# Patient Record
Sex: MALE | Race: WHITE | NOT HISPANIC OR LATINO | Employment: OTHER | URBAN - METROPOLITAN AREA
[De-identification: names, ages, dates, MRNs, and addresses within clinical notes are randomized per-mention and may not be internally consistent; named-entity substitution may affect disease eponyms.]

---

## 2017-05-28 PROCEDURE — 93296 REM INTERROG EVL PM/IDS: CPT | Performed by: INTERNAL MEDICINE

## 2017-05-28 PROCEDURE — 93295 DEV INTERROG REMOTE 1/2/MLT: CPT | Performed by: INTERNAL MEDICINE

## 2017-05-30 ENCOUNTER — CLINICAL SUPPORT NO REQUIREMENTS (OUTPATIENT)
Dept: CARDIOLOGY | Facility: CLINIC | Age: 62
End: 2017-05-30

## 2017-05-30 DIAGNOSIS — I48.0 PAROXYSMAL ATRIAL FIBRILLATION (HCC): ICD-10-CM

## 2017-05-30 DIAGNOSIS — I25.5 ISCHEMIC CARDIOMYOPATHY: ICD-10-CM

## 2017-06-06 RX ORDER — AMIODARONE HYDROCHLORIDE 200 MG/1
200 TABLET ORAL DAILY
Qty: 90 TABLET | Refills: 2 | Status: SHIPPED | OUTPATIENT
Start: 2017-06-06 | End: 2018-06-09 | Stop reason: SDUPTHER

## 2017-06-06 RX ORDER — SPIRONOLACTONE 25 MG/1
TABLET ORAL
Qty: 90 TABLET | Refills: 1 | Status: SHIPPED | OUTPATIENT
Start: 2017-06-06 | End: 2017-12-27 | Stop reason: SDUPTHER

## 2017-06-06 RX ORDER — OMEPRAZOLE 40 MG/1
CAPSULE, DELAYED RELEASE ORAL
Qty: 30 CAPSULE | Refills: 5 | Status: SHIPPED | OUTPATIENT
Start: 2017-06-06 | End: 2017-12-18 | Stop reason: HOSPADM

## 2017-07-07 ENCOUNTER — OFFICE VISIT (OUTPATIENT)
Dept: CARDIOLOGY | Facility: CLINIC | Age: 62
End: 2017-07-07

## 2017-07-07 VITALS
SYSTOLIC BLOOD PRESSURE: 130 MMHG | HEIGHT: 70 IN | BODY MASS INDEX: 29.84 KG/M2 | DIASTOLIC BLOOD PRESSURE: 81 MMHG | HEART RATE: 75 BPM | WEIGHT: 208.4 LBS

## 2017-07-07 DIAGNOSIS — I44.7 LEFT BUNDLE BRANCH BLOCK: ICD-10-CM

## 2017-07-07 DIAGNOSIS — I25.5 ISCHEMIC CARDIOMYOPATHY: Primary | ICD-10-CM

## 2017-07-07 DIAGNOSIS — I73.9 PERIPHERAL VASCULAR DISEASE (HCC): ICD-10-CM

## 2017-07-07 DIAGNOSIS — I25.9 ISCHEMIC HEART DISEASE: ICD-10-CM

## 2017-07-07 DIAGNOSIS — E78.5 DYSLIPIDEMIA: ICD-10-CM

## 2017-07-07 PROCEDURE — 99213 OFFICE O/P EST LOW 20 MIN: CPT | Performed by: INTERNAL MEDICINE

## 2017-07-07 RX ORDER — ALBUTEROL SULFATE 90 UG/1
2 AEROSOL, METERED RESPIRATORY (INHALATION) EVERY 6 HOURS PRN
Refills: 0 | COMMUNITY
Start: 2017-05-04 | End: 2019-07-10 | Stop reason: SDUPTHER

## 2017-07-07 NOTE — PROGRESS NOTES
Subjective:     Encounter Date:07/07/2017      Patient ID: Reza Mcclelland is a 62 y.o. single white male, [employer] , from [place], [place].      PHYSICIAN: Rolanda Billingsley MD/EMILY Romo  NEUROLOGIST: Jonas Quiroz MD   CARDIOVASCULAR SURGEON: Guillaume Pruitt MD, [facility]  INTERVENTIONAL CARDIOLOGIST: Ismael Nguyen MD, [facility]/ Oliverio Lemus MD, [facility]   ELECTROPHYSIOLOGIST: Joshua Jasmine MD, [facility], [facility]/Abhishek Kong MD, [facility]: DAKOTA Peters  ENDOCRINOLOGIST: unknown    Chief Complaint:   Chief Complaint   Patient presents with   • Atrial Fibrillation   • Cardiomyopathy     Problem List:  1. Ischemic heart disease/ischemic cardiomyopathy:  a. Remote non-ST-elevation myocardial infarction with emergent left heart catheterization and successful percutaneous transluminal coronary angioplasty and stenting of the ramus intermedius with a 3.0 x 12 mm Xience drug-eluting stent, 11/19/2008.  b. Left heart catheterization revealing severe stenosis in the LAD of about 80% to 90% in the mid portion with 2.5 x 28 mm, 3.0 x 28 mm, and 3.5 x 18 mm Xience drug-eluting stents in the mid and proximal portion of the LAD, critical left anterior descending stenosis with recommendations to return in 2 weeks for further intervention, 12/04/2008.  c. Left heart catheterization revealing critical right coronary artery stenosis with 3.5 x 28 mm Xience drug-eluting stent deployed in the mid RCA and Xience drug-eluting stent deployed in the proximal RCA, also revealing severe peripheral vascular disease with critical bilateral stenosis of common iliac artery with recommendations for the patient to remain on Plavix and aspirin and further evaluation of iliac stenosis, 12/18/2008.  d. Recurrent CCS class II-III angina with subsequent left heart catheterization and stenting with a 3.5 x 15 mm Xience drug-eluting stent to the proximal  LAD and 2.5 x 18 mm Xience drug-eluting stent into the distal LAD with widely patent stents in the RCA, 90% lesion in the stent of the LAD and widely patent stent of the diagonal branch. LVEF (0.65) with no wall motion abnormalities. Severe peripheral arterial disease with 60% stenosis of the left common iliac artery and 80% to 90% stenosis in the distal common femoral artery with recommendations for abdominal aortogram with right and left lower extremity arteriogram, 01/21/2011.  e. Cardiac catheterization with percutaneous transluminal angioplasty and self-expanding stent placement to the proximal innominate artery, distal embolic protection filter placement in the right carotid artery for cerebral protection during procedure, 10 x 20 mm Xact self-expanding stent deployed to the proximal innominate stenosis, total occlusion of the proximal left internal carotid artery, 11/29/2011.  f. Markedly reduced echocardiographic LVEF (0.25) with left ventricular chamber enlargement and thickened mitral valve leaflets with moderate MR in the absence of pericardial effusion or intracavitary thrombus/mass with markedly abnormal EKG demonstrating LBBB with marked QRS widening, spring 2013.  g. Recurrent progressive chest pain syndrome with severe single-vessel obstructive coronary disease and 100% total occlusion proximal right coronary artery at site of previously placed stent with mild nonobstructive plaque in the left coronary artery system and previously placed stents in the LAD, ramus intermedius artery patent with left ventriculography deferred and moderate bilateral common iliac disease, April 2013, with subsequent bi-ventricular AICD implant ([employer], model #[id number]) by Dr. Kong.  h. Acute non-ST elevation myocardial infarction/left heart catheterization, Dr. Lemus, with Xience drug-eluting stent to the ramus intermedius: Nonobstructive 50% to 60% mid to distal left anterior descending stenosis. Chronic  total occlusion of the dominant right coronary artery served by left-sided collaterals with left ventricular systolic dysfunction. EF 10% to 15%, 08/20/2014.  i. Biventricular ICD interrogation 7/7/17; RA pacing 72%, RV pacing 96% longevity 1.8 years, mode switch less than 1%, longest was 7 hours 5/9/17, 1 noise reversion both channels  j. Residual CCS class I angina pectoris/NYHA class II-III exertional dyspnea and fatigue.   2. Peripheral vascular disease:  a. Cardiac catheterization revealing total occlusion of left internal carotid artery previously in 2011.  b. CT angiogram of the neck showing total occlusion of the left internal carotid artery, 50% to 60% stenosis of the right carotid bulb, stent in the brachiocephalic artery placed by Dr. Santamaria in November 2011, totally occluded, March 2013.  c. Occluded left carotid artery with occluded innominate artery and intracranial circulation via thyroid artery collaterals and left vertebral with left axillary to right axillary bypass graft using 8 mm. PTFE ringed Propaten graft with arteriogram to the right subclavian artery and right axillary artery and right carotid artery, April 2013.  3. Cerebrovascular accident and initiation of warfarin therapy, March 2013.  4. Atrial fibrillation on presentation to outside hospital converted to sinus with Cardizem drip and subsequent recurrence of atrial fibrillation while in the ICU, treated with amiodarone and returned to sinus, August 2014.  5. Dyslipidemia.  6. Stage 3 chronic kidney disease.   7. Left bundle branch block, probably combined ischemic/nonischemic cardiomyopathy.  8. Remote acute-on-chronic respiratory failure with hospital admission for pneumonia on ventilator.  9. History of hypertension.  10. Tobacco abuse, previously resolved but recurrent, with cessation using Chantix, winter of 2015.  11. No prior additional surgical intervention.   12. Progressive erectile dysfunction.  13. Serial acceptable AICD  interrogations: February 2014; August 2014; March 2015, July 2017, with home Merlin monitoring.  14. Hyperthyroidism with abnormal thyroid ultrasound, finding thyroid nodule-data deficit.  2017     No Known Allergies      Current Outpatient Prescriptions:   •  amiodarone (PACERONE) 200 MG tablet, Take 1 tablet by mouth Daily., Disp: 90 tablet, Rfl: 2  •  Apixaban (ELIQUIS PO), Take 5 mg by mouth Daily. Pt takes 0.5 tablet bid, Disp: , Rfl:   •  atorvastatin (LIPITOR) 20 MG tablet, take 1 tablet by mouth at bedtime, Disp: 30 tablet, Rfl: 9  •  carvedilol (COREG) 6.25 MG tablet, Take 1 tablet by mouth 2 (Two) Times a Day With Meals., Disp: 60 tablet, Rfl: 9  •  RL-[id number]-D-Omega 3-Phytoster (ANIMI-3/VITAMIN D PO), Take  by mouth Daily., Disp: , Rfl:   •  furosemide (LASIX) 20 MG tablet, take 1 tablet by mouth twice a day, Disp: 60 tablet, Rfl: 9  •  Magnesium Oxide 400 (240 MG) MG tablet, Take 0.5 tablets by mouth daily., Disp: 30 tablet, Rfl: 5  •  omeprazole (priLOSEC) 40 MG capsule, take 1 capsule by mouth once daily, Disp: 30 capsule, Rfl: 5  •  potassium chloride (K-DUR,KLOR-CON) 20 MEQ CR tablet, take 1 tablet by mouth once daily, Disp: 30 tablet, Rfl: 5  •  sildenafil (VIAGRA) 50 MG tablet, Take 1 tablet by mouth Daily As Needed for erectile dysfunction., Disp: 10 tablet, Rfl: 3  •  spironolactone (ALDACTONE) 25 MG tablet, take 1 tablet by mouth once daily, Disp: 90 tablet, Rfl: 1  •  VENTOLIN  (90 BASE) MCG/ACT inhaler, As Needed., Disp: , Rfl: 0    History of Present Illness Patient returns for followup after a 7-month hiatus.  He denies any chest discomfort, marked shortness of breath, palpitations, edema, presyncope, and syncope.  He feels that he may need to retire soon because people are aggravating him at work, and he is having more difficulty completing his work assignments because of excessive fatigue, weakness, and dyspnea.  He is also tired of working in the extreme heat.  He had a recall on  "his ICD but had an interrogation while in office today which was acceptable; see below.  He had laboratory values drawn, and was found to have hyperthyroidism.  He has started seeing an endocrinologist but does not recall the name.  They performed an ultrasound on his thyroid and found that he had a nodule, but he is unsure of whether it is on the left side or the right side.  He is scheduled to go back to see his endocrinologist soon.  The doctor had concerns that his amiodarone was causing thyroid dysfunction.  No current tobacco or NTG use.      Review of Systems   Constitution: Positive for malaise/fatigue.   HENT: Positive for hearing loss.    Respiratory: Positive for snoring and sputum production.    Endocrine: Positive for polydipsia and polyuria.   Hematologic/Lymphatic: Bruises/bleeds easily.   Musculoskeletal: Positive for arthritis.   Gastrointestinal: Positive for heartburn.   Genitourinary: Positive for frequency.   Neurological: Positive for numbness.      Obtained and otherwise negative except as outlined in problem list and HPI.    Procedures [employer] biventricular Unify Quadra interrogation  DDDR 72% RA paced, 96% RV and LV paced.  Pacemaker dependent.  Nominal threshold and impedance for RA, RV, and LV leads.  Longevity 1.8 years.  Charge time 9.5 seconds.  Mode switched less than 1% with the longest 7 hours on 9 May 2017 consistent with atrial fibrillation with controlled ventricular response.  Nominal device check, normal pacemaker site, no reprogramming.       Objective:       Vitals:    07/07/17 1533 07/07/17 1535   BP: 125/79 130/81   BP Location: Left arm Left arm   Patient Position: Sitting Standing   Pulse: 70 75   Weight: 208 lb 6.4 oz (94.5 kg)    Height: 70\" (177.8 cm)      Body mass index is 29.9 kg/(m^2).   Last weight:  216 lbs.    Physical Exam   Constitutional: He is oriented to person, place, and time. He appears well-developed and well-nourished.   Neck: No JVD present. Carotid " bruit is present (bilateral). No thyromegaly present.   Cardiovascular: Regular rhythm, S1 normal and S2 normal.  Exam reveals no gallop, no S3 and no friction rub.    Murmur heard.   Medium-pitched harsh early systolic murmur is present with a grade of 2/6  at the upper left sternal border  Pulses:       Carotid pulses are 1+ on the right side, and 1+ on the left side.       Radial pulses are 1+ on the right side, and 1+ on the left side.        Femoral pulses are 1+ on the right side, and 1+ on the left side.       Popliteal pulses are 1+ on the right side, and 1+ on the left side.        Dorsalis pedis pulses are 1+ on the right side, and 1+ on the left side.        Posterior tibial pulses are 1+ on the right side, and 1+ on the left side.   Pulmonary/Chest: Effort normal. He has decreased breath sounds. He has no wheezes. He has no rhonchi. He has no rales.   Abdominal: Soft. He exhibits no mass. There is no hepatosplenomegaly. There is no tenderness. There is no guarding.   Lymphadenopathy:     He has no cervical adenopathy.   Neurological: He is alert and oriented to person, place, and time.   Skin: Skin is warm, dry and intact. No rash noted.   Vitals reviewed.      Lab Review:   Lab Results   Component Value Date    GLUCOSE 85 03/30/2015    BUN 19 03/30/2015    CREATININE 1.6 (H) 03/30/2015    CO2 27 03/30/2015    CALCIUM 9.3 03/30/2015    ALBUMIN 4.3 11/20/2014    AST 15 11/20/2014    ALT 12 11/20/2014       Lab Results   Component Value Date    WBC 11.72 (H) 03/30/2015    HGB 17.3 03/30/2015    HCT 48.8 03/30/2015    MCV 85.8 03/30/2015     03/30/2015       Lab Results   Component Value Date    HGBA1C 5.1 08/20/2014       Lab Results   Component Value Date    TRIG 65 08/20/2014     Lab Results   Component Value Date    HDL 23 (L) 08/20/2014   3/23/17;  · PSA 0.6  · CBC; WBC 9.7, RBC 5.44, hemoglobin 16.4, hematocrit 48.4, MCV 89, MCH 30, MCHC 34, RDW 14.1, MPV 8.4, platelets 159, neutrophils 48,  lymphocytes 36, monocytes 7, eos 5, basos 2  · TSH 0.065  ·   Biventricular ICD interrogation 7/7/17; RA pacing 72%, RV pacing 96% longevity 1.8 years, mode switch less than 1%, longest was 7 hours 5/9/17, 1 noise reversion both channels    Assessment:    Patient with ischemic heart disease/ischemic cardiomyopathy, with last EF in 2014 of 0.10-0.15.  He has a biventricular ICD which was interrogated today with acceptable results.  Patient is having more difficulties with his work, related to fatigue.  In view of his ischemic cardiomyopathy, peripheral vascular disease, paroxysmal atrial fibrillation, and stage III chronic kidney disease, we feel that it would be beneficial for him to be on disability.  We encouraged the patient to start this process.  We will order an echocardiogram to be performed when he returns in December 2017.  We are happy that he is having further evaluation of his thyroid dysfunction and thyroid nodule with an endocrinologist; data deficit.     Diagnosis Plan   1. Ischemic cardiomyopathy     2. Ischemic heart disease     3. Peripheral vascular disease     4. Left bundle branch block     5. Dyslipidemia            Plan:         1. Patient to continue current medications and close follow up with the above providers.  2. Tentative cardiology follow up in December 2017 or patient may return sooner PRN.  3. Echocardiogram when he returns for followup in December 2017.       Scribed for Andrea Guevara MD by Rachael Michel, APRN. 7/7/2017  3:51 PM    I, Andrea Guevara MD, [facility], personally performed the services described in this documentation as scribed by the above named individual in my presence, and it is both accurate and complete. At 4:11 PM on 07/07/2017

## 2017-07-12 ENCOUNTER — TELEPHONE (OUTPATIENT)
Dept: CARDIOLOGY | Facility: CLINIC | Age: 62
End: 2017-07-12

## 2017-07-12 NOTE — TELEPHONE ENCOUNTER
Patient states that he as decided to retire from his position given current health status.      He is requesting a letter from Dr. Guevara stating that given his current medical condition he is not fit to work.  Specifically that he is not able to lift over 25lbs continually - as this is a stipulation for his work.    Please advise.

## 2017-10-10 RX ORDER — POTASSIUM CHLORIDE 20 MEQ/1
TABLET, EXTENDED RELEASE ORAL
Qty: 30 TABLET | Refills: 5 | Status: SHIPPED | OUTPATIENT
Start: 2017-10-10 | End: 2019-02-09 | Stop reason: SDUPTHER

## 2017-11-28 ENCOUNTER — TELEPHONE (OUTPATIENT)
Dept: CARDIOLOGY | Facility: CLINIC | Age: 62
End: 2017-11-28

## 2017-11-28 NOTE — TELEPHONE ENCOUNTER
Ms. Herrera called stating that patient is in [facility].  She stated he had a heart attack and is on a ventilator in the cardiac ICU there.  She said the hospital had faxed a medical record request but had not received medical records.  I sent cardiac cath, ekg's and the last two office notes.  She was insisting on the doctors talking to [facility] but the physicians have not called. She left a number to the floor for [facility] to call and speak with Anjel Moyer or Dr. Jordna. [phone].

## 2017-12-04 ENCOUNTER — TELEPHONE (OUTPATIENT)
Dept: CARDIOLOGY | Facility: CLINIC | Age: 62
End: 2017-12-04

## 2017-12-04 NOTE — TELEPHONE ENCOUNTER
Ms. Herrera called regarding patient.  He was released from [facility] this Sunday and she wanted to know if he needed to be seen this week since he had stents place.  Informed patient that normally a hospital follow ups are a month out and that insurance will not pay if for it.  I asked her if was on Plavix and she yes.  I told her if he had any problems between now and his scheduled appoint of January 10, 2018 to call and that to get have his medical records from [place] faxed to us. Gave her the fax number. She verbalizes understanding.

## 2017-12-12 ENCOUNTER — TELEPHONE (OUTPATIENT)
Dept: CARDIOLOGY | Facility: CLINIC | Age: 62
End: 2017-12-12

## 2017-12-12 NOTE — TELEPHONE ENCOUNTER
Patient is in [facility] in [place] and has had stroke.  His emergency contact Hannah Herrera is requesting he be seen sooner than January 10th. I told her she would need to call me back when he is out of the hospital before I can move him up on the schedule.

## 2017-12-13 ENCOUNTER — TELEPHONE (OUTPATIENT)
Dept: CARDIOLOGY | Facility: CLINIC | Age: 62
End: 2017-12-13

## 2017-12-13 NOTE — TELEPHONE ENCOUNTER
Patient is being released from hospital today and needs to be seen ASAP for 60-70% blockage in left carotid artery according the Hannah Herrera which caused his stroke. Made an appointment for patient Friday 12-15 @ 9 am. Informed them to bring medical records to appointment or have them faxed to me.  She verbalizes understanding.

## 2017-12-15 ENCOUNTER — PREP FOR SURGERY (OUTPATIENT)
Dept: OTHER | Facility: HOSPITAL | Age: 62
End: 2017-12-15

## 2017-12-15 ENCOUNTER — APPOINTMENT (OUTPATIENT)
Dept: PULMONOLOGY | Facility: HOSPITAL | Age: 62
End: 2017-12-15
Attending: INTERNAL MEDICINE

## 2017-12-15 ENCOUNTER — APPOINTMENT (OUTPATIENT)
Dept: CARDIOLOGY | Facility: HOSPITAL | Age: 62
End: 2017-12-15

## 2017-12-15 ENCOUNTER — HOSPITAL ENCOUNTER (OUTPATIENT)
Facility: HOSPITAL | Age: 62
Setting detail: OBSERVATION
Discharge: HOME | End: 2017-12-18
Attending: INTERNAL MEDICINE | Admitting: INTERNAL MEDICINE

## 2017-12-15 ENCOUNTER — APPOINTMENT (OUTPATIENT)
Dept: CT IMAGING | Facility: HOSPITAL | Age: 62
End: 2017-12-15

## 2017-12-15 ENCOUNTER — OFFICE VISIT (OUTPATIENT)
Dept: CARDIOLOGY | Facility: CLINIC | Age: 62
End: 2017-12-15

## 2017-12-15 ENCOUNTER — APPOINTMENT (OUTPATIENT)
Dept: GENERAL RADIOLOGY | Facility: HOSPITAL | Age: 62
End: 2017-12-15

## 2017-12-15 VITALS
DIASTOLIC BLOOD PRESSURE: 73 MMHG | HEART RATE: 70 BPM | WEIGHT: 199.4 LBS | BODY MASS INDEX: 30.22 KG/M2 | HEIGHT: 68 IN | SYSTOLIC BLOOD PRESSURE: 115 MMHG

## 2017-12-15 DIAGNOSIS — I25.5 ISCHEMIC CARDIOMYOPATHY: ICD-10-CM

## 2017-12-15 DIAGNOSIS — I25.9 ISCHEMIC HEART DISEASE: Primary | ICD-10-CM

## 2017-12-15 DIAGNOSIS — I63.9 CEREBROVASCULAR ACCIDENT (CVA), UNSPECIFIED MECHANISM (HCC): ICD-10-CM

## 2017-12-15 DIAGNOSIS — N18.30 CHRONIC KIDNEY DISEASE (CKD), STAGE III (MODERATE) (HCC): ICD-10-CM

## 2017-12-15 DIAGNOSIS — E78.5 DYSLIPIDEMIA: ICD-10-CM

## 2017-12-15 DIAGNOSIS — I48.0 PAROXYSMAL ATRIAL FIBRILLATION (HCC): ICD-10-CM

## 2017-12-15 DIAGNOSIS — J96.20 ACUTE ON CHRONIC RESPIRATORY FAILURE, UNSPECIFIED WHETHER WITH HYPOXIA OR HYPERCAPNIA (HCC): ICD-10-CM

## 2017-12-15 DIAGNOSIS — R13.12 OROPHARYNGEAL DYSPHAGIA: ICD-10-CM

## 2017-12-15 PROBLEM — J41.0 SIMPLE CHRONIC BRONCHITIS (HCC): Status: ACTIVE | Noted: 2017-12-15

## 2017-12-15 PROBLEM — I65.21 CAROTID STENOSIS, RIGHT: Status: ACTIVE | Noted: 2017-12-15

## 2017-12-15 PROBLEM — I65.22 CAROTID OCCLUSION, LEFT: Status: ACTIVE | Noted: 2017-12-15

## 2017-12-15 PROBLEM — Z95.810 AICD (AUTOMATIC CARDIOVERTER/DEFIBRILLATOR) PRESENT: Status: ACTIVE | Noted: 2017-12-15

## 2017-12-15 LAB
ALBUMIN SERPL-MCNC: 4 G/DL (ref 3.2–4.8)
ALBUMIN/GLOB SERPL: 1.4 G/DL (ref 1.5–2.5)
ALP SERPL-CCNC: 70 U/L (ref 25–100)
ALT SERPL W P-5'-P-CCNC: 23 U/L (ref 7–40)
ANION GAP SERPL CALCULATED.3IONS-SCNC: 12 MMOL/L (ref 3–11)
APTT PPP: 29.1 SECONDS (ref 24–31)
AST SERPL-CCNC: 17 U/L (ref 0–33)
BASOPHILS # BLD AUTO: 0.01 10*3/MM3 (ref 0–0.2)
BASOPHILS NFR BLD AUTO: 0.1 % (ref 0–1)
BH CV ECHO MEAS - AI DEC SLOPE: 137.3 CM/SEC^2
BH CV ECHO MEAS - AI MAX PG: 54.2 MMHG
BH CV ECHO MEAS - AI MAX VEL: 368 CM/SEC
BH CV ECHO MEAS - AI P1/2T: 784.8 MSEC
BH CV ECHO MEAS - AO ROOT AREA (BSA CORRECTED): 1.8
BH CV ECHO MEAS - AO ROOT AREA: 11.3 CM^2
BH CV ECHO MEAS - AO ROOT DIAM: 3.8 CM
BH CV ECHO MEAS - BSA(HAYCOCK): 2.1 M^2
BH CV ECHO MEAS - BSA(HAYCOCK): 2.1 M^2
BH CV ECHO MEAS - BSA: 2 M^2
BH CV ECHO MEAS - BSA: 2.1 M^2
BH CV ECHO MEAS - BZI_BMI: 28.3 KILOGRAMS/M^2
BH CV ECHO MEAS - BZI_BMI: 29.8 KILOGRAMS/M^2
BH CV ECHO MEAS - BZI_METRIC_HEIGHT: 172.7 CM
BH CV ECHO MEAS - BZI_METRIC_HEIGHT: 177.8 CM
BH CV ECHO MEAS - BZI_METRIC_WEIGHT: 88.9 KG
BH CV ECHO MEAS - BZI_METRIC_WEIGHT: 89.4 KG
BH CV ECHO MEAS - CONTRAST EF (2CH): 17.8 ML/M^2
BH CV ECHO MEAS - CONTRAST EF 4CH: 38 ML/M^2
BH CV ECHO MEAS - EDV(CUBED): 335.7 ML
BH CV ECHO MEAS - EDV(MOD-SP2): 286 ML
BH CV ECHO MEAS - EDV(MOD-SP4): 245 ML
BH CV ECHO MEAS - EDV(TEICH): 251.3 ML
BH CV ECHO MEAS - EF(CUBED): 48.8 %
BH CV ECHO MEAS - EF(MOD-SP2): 17.8 %
BH CV ECHO MEAS - EF(MOD-SP4): 38 %
BH CV ECHO MEAS - EF(TEICH): 39.9 %
BH CV ECHO MEAS - ESV(CUBED): 171.9 ML
BH CV ECHO MEAS - ESV(MOD-SP2): 235 ML
BH CV ECHO MEAS - ESV(MOD-SP4): 152 ML
BH CV ECHO MEAS - ESV(TEICH): 151.2 ML
BH CV ECHO MEAS - FS: 20 %
BH CV ECHO MEAS - IVS/LVPW: 0.86
BH CV ECHO MEAS - IVSD: 1.2 CM
BH CV ECHO MEAS - LA DIMENSION: 4.3 CM
BH CV ECHO MEAS - LA/AO: 1.1
BH CV ECHO MEAS - LV DIASTOLIC VOL/BSA (35-75): 118.1 ML/M^2
BH CV ECHO MEAS - LV MASS(C)D: 377.7 GRAMS
BH CV ECHO MEAS - LV MASS(C)DI: 182.1 GRAMS/M^2
BH CV ECHO MEAS - LV SYSTOLIC VOL/BSA (12-30): 73.3 ML/M^2
BH CV ECHO MEAS - LVIDD: 7 CM
BH CV ECHO MEAS - LVIDS: 5.6 CM
BH CV ECHO MEAS - LVLD AP2: 9.4 CM
BH CV ECHO MEAS - LVLD AP4: 8.5 CM
BH CV ECHO MEAS - LVLS AP2: 10 CM
BH CV ECHO MEAS - LVLS AP4: 9.7 CM
BH CV ECHO MEAS - LVPWD: 1.2 CM
BH CV ECHO MEAS - MV A MAX VEL: 72.1 CM/SEC
BH CV ECHO MEAS - MV DEC TIME: 0.33 SEC
BH CV ECHO MEAS - MV E MAX VEL: 37 CM/SEC
BH CV ECHO MEAS - MV E/A: 0.51
BH CV ECHO MEAS - PA ACC SLOPE: 609 CM/SEC^2
BH CV ECHO MEAS - PA ACC TIME: 0.1 SEC
BH CV ECHO MEAS - PA PR(ACCEL): 34.5 MMHG
BH CV ECHO MEAS - RVDD: 2.7 CM
BH CV ECHO MEAS - SI(CUBED): 79 ML/M^2
BH CV ECHO MEAS - SI(MOD-SP2): 24.6 ML/M^2
BH CV ECHO MEAS - SI(MOD-SP4): 44.8 ML/M^2
BH CV ECHO MEAS - SI(TEICH): 48.3 ML/M^2
BH CV ECHO MEAS - SV(CUBED): 163.8 ML
BH CV ECHO MEAS - SV(MOD-SP2): 51 ML
BH CV ECHO MEAS - SV(MOD-SP4): 93 ML
BH CV ECHO MEAS - SV(TEICH): 100.2 ML
BH CV ECHO MEAS - TAPSE (>1.6): 2.4 CM2
BH CV VAS BP RIGHT ARM: NORMAL MMHG
BH CV XLRA - RV BASE: 2.8 CM
BH CV XLRA - RV LENGTH: 7.6 CM
BH CV XLRA - RV MID: 2.2 CM
BH CV XLRA - TDI S': 10.4 CM/SEC
BH CV XLRA MEAS LEFT DIST CCA EDV: 12.3 CM/SEC
BH CV XLRA MEAS LEFT DIST CCA PSV: 47.1 CM/SEC
BH CV XLRA MEAS LEFT MID CCA EDV: 18 CM/SEC
BH CV XLRA MEAS LEFT MID CCA PSV: 71.8 CM/SEC
BH CV XLRA MEAS LEFT PROX CCA EDV: 10.1 CM/SEC
BH CV XLRA MEAS LEFT PROX CCA PSV: 77.5 CM/SEC
BH CV XLRA MEAS LEFT PROX ECA EDV: 24.7 CM/SEC
BH CV XLRA MEAS LEFT PROX ECA PSV: 138 CM/SEC
BH CV XLRA MEAS LEFT PROX SCLA EDV: 38.2 CM/SEC
BH CV XLRA MEAS LEFT PROX SCLA PSV: 199 CM/SEC
BH CV XLRA MEAS LEFT VERTEBRAL A EDV: 14 CM/SEC
BH CV XLRA MEAS LEFT VERTEBRAL A PSV: 37.7 CM/SEC
BH CV XLRA MEAS RIGHT DIST CCA EDV: 19.9 CM/SEC
BH CV XLRA MEAS RIGHT DIST CCA PSV: 33.1 CM/SEC
BH CV XLRA MEAS RIGHT DIST ICA EDV: 40.3 CM/SEC
BH CV XLRA MEAS RIGHT DIST ICA PSV: 68.4 CM/SEC
BH CV XLRA MEAS RIGHT ICA/CCA RATIO: 2.3
BH CV XLRA MEAS RIGHT MID CCA EDV: 17.1 CM/SEC
BH CV XLRA MEAS RIGHT MID CCA PSV: 30.9 CM/SEC
BH CV XLRA MEAS RIGHT MID ICA EDV: 45.8 CM/SEC
BH CV XLRA MEAS RIGHT MID ICA PSV: 78.3 CM/SEC
BH CV XLRA MEAS RIGHT PROX CCA EDV: 18.2 CM/SEC
BH CV XLRA MEAS RIGHT PROX CCA PSV: 29.8 CM/SEC
BH CV XLRA MEAS RIGHT PROX ECA EDV: 19.3 CM/SEC
BH CV XLRA MEAS RIGHT PROX ECA PSV: 40.8 CM/SEC
BH CV XLRA MEAS RIGHT PROX ICA EDV: 33.1 CM/SEC
BH CV XLRA MEAS RIGHT PROX ICA PSV: 60.1 CM/SEC
BILIRUB SERPL-MCNC: 1 MG/DL (ref 0.3–1.2)
BNP SERPL-MCNC: 637 PG/ML (ref 0–100)
BUN BLD-MCNC: 30 MG/DL (ref 9–23)
BUN/CREAT SERPL: 21.4 (ref 7–25)
CALCIUM SPEC-SCNC: 9.2 MG/DL (ref 8.7–10.4)
CHLORIDE SERPL-SCNC: 98 MMOL/L (ref 99–109)
CO2 SERPL-SCNC: 27 MMOL/L (ref 20–31)
CREAT BLD-MCNC: 1.4 MG/DL (ref 0.6–1.3)
DEPRECATED RDW RBC AUTO: 42.6 FL (ref 37–54)
EOSINOPHIL # BLD AUTO: 0.02 10*3/MM3 (ref 0–0.3)
EOSINOPHIL NFR BLD AUTO: 0.1 % (ref 0–3)
ERYTHROCYTE [DISTWIDTH] IN BLOOD BY AUTOMATED COUNT: 13.1 % (ref 11.3–14.5)
GFR SERPL CREATININE-BSD FRML MDRD: 51 ML/MIN/1.73
GLOBULIN UR ELPH-MCNC: 2.8 GM/DL
GLUCOSE BLD-MCNC: 128 MG/DL (ref 70–100)
HCT VFR BLD AUTO: 49.3 % (ref 38.9–50.9)
HGB BLD-MCNC: 16.7 G/DL (ref 13.1–17.5)
IMM GRANULOCYTES # BLD: 0.08 10*3/MM3 (ref 0–0.03)
IMM GRANULOCYTES NFR BLD: 0.4 % (ref 0–0.6)
INR PPP: 1.14
LEFT ATRIUM VOLUME INDEX: 25.6 ML/M2
LV EF 2D ECHO EST: 30 %
LYMPHOCYTES # BLD AUTO: 1.24 10*3/MM3 (ref 0.6–4.8)
LYMPHOCYTES NFR BLD AUTO: 6.9 % (ref 24–44)
MAXIMAL PREDICTED HEART RATE: 158 BPM
MCH RBC QN AUTO: 30.5 PG (ref 27–31)
MCHC RBC AUTO-ENTMCNC: 33.9 G/DL (ref 32–36)
MCV RBC AUTO: 90.1 FL (ref 80–99)
MONOCYTES # BLD AUTO: 0.76 10*3/MM3 (ref 0–1)
MONOCYTES NFR BLD AUTO: 4.2 % (ref 0–12)
NEUTROPHILS # BLD AUTO: 15.97 10*3/MM3 (ref 1.5–8.3)
NEUTROPHILS NFR BLD AUTO: 88.3 % (ref 41–71)
PLATELET # BLD AUTO: 231 10*3/MM3 (ref 150–450)
PMV BLD AUTO: 9.7 FL (ref 6–12)
POTASSIUM BLD-SCNC: 4.1 MMOL/L (ref 3.5–5.5)
PROT SERPL-MCNC: 6.8 G/DL (ref 5.7–8.2)
PROTHROMBIN TIME: 12.5 SECONDS (ref 9.6–11.5)
RBC # BLD AUTO: 5.47 10*6/MM3 (ref 4.2–5.76)
RIGHT ARM BP: NORMAL MMHG
SODIUM BLD-SCNC: 137 MMOL/L (ref 132–146)
STRESS TARGET HR: 134 BPM
WBC NRBC COR # BLD: 18.08 10*3/MM3 (ref 3.5–10.8)

## 2017-12-15 PROCEDURE — 71010 HC CHEST PA OR AP: CPT

## 2017-12-15 PROCEDURE — G0378 HOSPITAL OBSERVATION PER HR: HCPCS

## 2017-12-15 PROCEDURE — 99214 OFFICE O/P EST MOD 30 MIN: CPT | Performed by: INTERNAL MEDICINE

## 2017-12-15 PROCEDURE — 70450 CT HEAD/BRAIN W/O DYE: CPT

## 2017-12-15 PROCEDURE — 92610 EVALUATE SWALLOWING FUNCTION: CPT

## 2017-12-15 PROCEDURE — 94060 EVALUATION OF WHEEZING: CPT | Performed by: INTERNAL MEDICINE

## 2017-12-15 PROCEDURE — 85730 THROMBOPLASTIN TIME PARTIAL: CPT | Performed by: NURSE PRACTITIONER

## 2017-12-15 PROCEDURE — 93880 EXTRACRANIAL BILAT STUDY: CPT | Performed by: INTERNAL MEDICINE

## 2017-12-15 PROCEDURE — 93306 TTE W/DOPPLER COMPLETE: CPT | Performed by: INTERNAL MEDICINE

## 2017-12-15 PROCEDURE — 83880 ASSAY OF NATRIURETIC PEPTIDE: CPT | Performed by: NURSE PRACTITIONER

## 2017-12-15 PROCEDURE — 25010000002 FUROSEMIDE PER 20 MG: Performed by: NURSE PRACTITIONER

## 2017-12-15 PROCEDURE — 85610 PROTHROMBIN TIME: CPT | Performed by: NURSE PRACTITIONER

## 2017-12-15 PROCEDURE — 93880 EXTRACRANIAL BILAT STUDY: CPT

## 2017-12-15 PROCEDURE — G8996 SWALLOW CURRENT STATUS: HCPCS

## 2017-12-15 PROCEDURE — 80053 COMPREHEN METABOLIC PANEL: CPT | Performed by: NURSE PRACTITIONER

## 2017-12-15 PROCEDURE — 93306 TTE W/DOPPLER COMPLETE: CPT

## 2017-12-15 PROCEDURE — 96374 THER/PROPH/DIAG INJ IV PUSH: CPT

## 2017-12-15 PROCEDURE — 85025 COMPLETE CBC W/AUTO DIFF WBC: CPT | Performed by: NURSE PRACTITIONER

## 2017-12-15 PROCEDURE — 99219 PR INITIAL OBSERVATION CARE/DAY 50 MINUTES: CPT | Performed by: INTERNAL MEDICINE

## 2017-12-15 PROCEDURE — G8997 SWALLOW GOAL STATUS: HCPCS

## 2017-12-15 PROCEDURE — 94060 EVALUATION OF WHEEZING: CPT

## 2017-12-15 PROCEDURE — 94640 AIRWAY INHALATION TREATMENT: CPT

## 2017-12-15 PROCEDURE — 99245 OFF/OP CONSLTJ NEW/EST HI 55: CPT | Performed by: PSYCHIATRY & NEUROLOGY

## 2017-12-15 PROCEDURE — 93000 ELECTROCARDIOGRAM COMPLETE: CPT | Performed by: INTERNAL MEDICINE

## 2017-12-15 RX ORDER — DOXYCYCLINE HYCLATE 100 MG
100 TABLET ORAL 2 TIMES DAILY
COMMUNITY
End: 2018-01-03

## 2017-12-15 RX ORDER — AMIODARONE HYDROCHLORIDE 200 MG/1
200 TABLET ORAL DAILY
Status: DISCONTINUED | OUTPATIENT
Start: 2017-12-16 | End: 2017-12-18 | Stop reason: HOSPADM

## 2017-12-15 RX ORDER — ATORVASTATIN CALCIUM 80 MG/1
80 TABLET, FILM COATED ORAL NIGHTLY
COMMUNITY
End: 2018-01-15 | Stop reason: SDUPTHER

## 2017-12-15 RX ORDER — PANTOPRAZOLE SODIUM 40 MG/1
40 TABLET, DELAYED RELEASE ORAL EVERY MORNING
Status: DISCONTINUED | OUTPATIENT
Start: 2017-12-16 | End: 2017-12-18 | Stop reason: HOSPADM

## 2017-12-15 RX ORDER — ATORVASTATIN CALCIUM 40 MG/1
80 TABLET, FILM COATED ORAL NIGHTLY
Status: DISCONTINUED | OUTPATIENT
Start: 2017-12-15 | End: 2017-12-18 | Stop reason: HOSPADM

## 2017-12-15 RX ORDER — CEFUROXIME AXETIL 500 MG/1
500 TABLET ORAL 2 TIMES DAILY
COMMUNITY
End: 2018-01-03

## 2017-12-15 RX ORDER — CARVEDILOL 6.25 MG/1
6.25 TABLET ORAL 2 TIMES DAILY WITH MEALS
Status: DISCONTINUED | OUTPATIENT
Start: 2017-12-15 | End: 2017-12-18 | Stop reason: HOSPADM

## 2017-12-15 RX ORDER — PREDNISONE 10 MG/1
10 TABLET ORAL DAILY
COMMUNITY
End: 2018-01-03

## 2017-12-15 RX ORDER — FUROSEMIDE 10 MG/ML
20 INJECTION INTRAMUSCULAR; INTRAVENOUS EVERY 12 HOURS
Status: DISCONTINUED | OUTPATIENT
Start: 2017-12-15 | End: 2017-12-18 | Stop reason: HOSPADM

## 2017-12-15 RX ORDER — IPRATROPIUM BROMIDE AND ALBUTEROL SULFATE 2.5; .5 MG/3ML; MG/3ML
3 SOLUTION RESPIRATORY (INHALATION)
Status: DISCONTINUED | OUTPATIENT
Start: 2017-12-15 | End: 2017-12-18 | Stop reason: HOSPADM

## 2017-12-15 RX ORDER — SPIRONOLACTONE 25 MG/1
25 TABLET ORAL DAILY
Status: DISCONTINUED | OUTPATIENT
Start: 2017-12-16 | End: 2017-12-18 | Stop reason: HOSPADM

## 2017-12-15 RX ORDER — PREDNISONE 10 MG/1
30 TABLET ORAL DAILY
Status: DISCONTINUED | OUTPATIENT
Start: 2017-12-16 | End: 2017-12-18 | Stop reason: HOSPADM

## 2017-12-15 RX ADMIN — ATORVASTATIN CALCIUM 80 MG: 40 TABLET, FILM COATED ORAL at 21:53

## 2017-12-15 RX ADMIN — IPRATROPIUM BROMIDE AND ALBUTEROL SULFATE 3 ML: .5; 3 SOLUTION RESPIRATORY (INHALATION) at 21:10

## 2017-12-15 RX ADMIN — FUROSEMIDE 20 MG: 10 INJECTION, SOLUTION INTRAMUSCULAR; INTRAVENOUS at 13:24

## 2017-12-15 RX ADMIN — APIXABAN 2.5 MG: 2.5 TABLET, FILM COATED ORAL at 21:53

## 2017-12-15 RX ADMIN — TICAGRELOR 90 MG: 90 TABLET ORAL at 21:53

## 2017-12-15 NOTE — THERAPY EVALUATION
Acute Care - Speech Language Pathology   Swallow Initial Evaluation [facility]   Clinical Swallow Evaluation       Patient Name: Reza Mcclelland  : 1955  MRN: [id number]  Today's Date: 12/15/2017               Admit Date: 12/15/2017    Visit Dx:     ICD-10-CM ICD-9-CM   1. Ischemic heart disease I25.9 414.9   2. Acute on chronic respiratory failure, unspecified whether with hypoxia or hypercapnia J96.20 518.84     Patient Active Problem List   Diagnosis   • Ischemic heart disease   • Ischemic cardiomyopathy   • Peripheral vascular disease   • Cerebrovascular accident   • Dyslipidemia   • Chronic kidney disease (CKD), stage III (moderate)   • Left bundle branch block   • History of hypertension   • COPD   • AICD (automatic cardioverter/defibrillator) present   • Carotid occlusion, left     Past Medical History:   Diagnosis Date   • Acute on chronic respiratory failure     Recent acute-on-chronic respiratory failure with hospital admission for pneumonia on ventilator.   • AICD (automatic cardioverter/defibrillator) present    • Atrial fibrillation     Atrial fibrillation on presentation to outside hospital converted to sinus with Cardizem drip and subsequent recurrence of atrial fibrillation while in the ICU, treated with amiodarone and returned to sinus, 2014.   • CAD (coronary artery disease)    • Cerebrovascular accident     Cerebrovascular accident and initiation of warfarin therapy, 2013.   • Chronic kidney disease (CKD), stage III (moderate)    • Dyslipidemia    • History of hypertension    • Ischemic cardiomyopathy    • Ischemic heart disease    • Left bundle branch block     Left bundle branch block, probably combined ischemic/nonischemic cardiomyopathy.   • Peripheral vascular disease      Past Surgical History:   Procedure Laterality Date   • AXILLARY AXILLARY BYPASS     • CAROTID STENT            SWALLOW EVALUATION (last 72 hours)      Swallow Evaluation       12/15/17 7824                 Rehab Evaluation    Document Type evaluation  -VW        Subjective Information no complaints;agree to therapy  -VW        General Information    Patient Profile Review yes  -VW        Subjective Patient Observations alert, cooperative  -VW        Pertinent History Of Current Problem Pt adm for ischemic cardiomyopathy. PMH includes stroke (2013). No imaging completed at this time. Pt reports he has had swallowing services at a previous hospitalization and that he had ground food.   -VW        Current Diet Limitations NPO  -VW        Precautions/Limitations, Vision WFL;other (see comments)   for purposes of eval  -VW        Precautions/Limitations, Hearing WFL;other (see comments)   for purposes of eval  -VW        Prior Level of Function- Communication functional in all spheres  -VW        Prior Level of Function- Swallowing diet modifications- foods;other (comment)   per patient, he had ground food at previous hospitalization  -VW        Plans/Goals Discussed With patient;other;agreed upon   RN  -VW        Barriers to Rehab none identified  -VW        Clinical Impression    Patient's Goals For Discharge return to PO diet  -VW        SLP Swallowing Diagnosis other (see comments)   r/o pharyngeal dysphagia  -VW        Criteria for Skilled Therapeutic Interventions Met demonstrates skilled criteria for intervention  -VW        FCM, Swallowing 2-->Level 2  -VW        SLP Diet Recommendation NPO: unsafe for food/liquid intake  -VW        Recommended Diagnostics FEES  -VW        SLP Rec. for Method of Medication Administration meds whole in pudding/applesauce  -VW        Anticipated Discharge Disposition other (see comments)   unknown at this time  -VW        Pain Assessment    Pain Assessment No/denies pain  -VW        Oral Motor Structure and Function    Oral Motor Anatomy and Physiology patient demonstrates anatomy and physiology that is WNL  -VW        Dentition Assessment missing teeth  -VW        Secretion  Management WNL/WFL  -VW        Mucosal Quality moist, healthy  -VW        Oral Musculature General Assessment oral labial impairment  -        Oral Labial Strength and Mobility left labial droop  -        General Feeding/Swallowing Observations    Current Feeding Method NPO  -        Observations of Posture During Feeding upright at 90 degrees  -        Clinical Swallow Exam    Mode of Presentation fed by clinician;self fed;cup;spoon;straw  -VW        Oral Preparation Concerns unable to assess;other (see comments)   DNT solid  -VW        Oral Transit Concerns unable to assess;other (see comments)   DNT solid  -VW        Oral Phase Results unable to assess;other (see comments)   DNT solid  -VW        Pharyngeal Phase Results sign/symptoms of pharyngeal impairment;throat clear  -VW        Summary of Clinical Exam Trialed thins via cup/straw, NTL, and puree. Throat clear w/ every consistency trialed. Pt w/ history of stroke. Recommended NPO until further evaluation can be completed. Meds okay in pudding.   -        Swallow Recommendations    Oral Care oral care with toothbrush and dentifrice BID and PRN  -        Other Recommendations meds whole;pudding/spoon thick  -        Recommended Diet NPO: unsafe for food/liquid intake  -          User Key  (r) = Recorded By, (t) = Taken By, (c) = Cosigned By    Initials Name Effective Dates     Randa Morton MA, CFY-SLP 07/13/17 -         EDUCATION  The patient has been educated in the following areas:   Dysphagia (Swallowing Impairment) Oral Care/Hydration NPO rationale.    SLP Recommendation and Plan  SLP Swallowing Diagnosis: other (see comments) (r/o pharyngeal dysphagia)  SLP Diet Recommendation: NPO: unsafe for food/liquid intake     SLP Rec. for Method of Medication Administration: meds whole in pudding/applesauce     Recommended Diagnostics: FEES  Criteria for Skilled Therapeutic Interventions Met: demonstrates skilled criteria for  "intervention  Anticipated Discharge Disposition: other (see comments) (unknown at this time)                   Plan of Care Review  Plan Of Care Reviewed With: patient  Progress:  (initial eval)  Outcome Summary/Follow up Plan: Clinical dysphagia evaluation completed this pm: Pt w/ hx stroke, per chart. Pt reported having modified diet during previous hospitalization (\"ground food\"), denied any liquid consistency change or swallowing difficulty. Imaging pending at time of evaluation. Pt trialed thin via cup/straw, nectar-thick, and puree. Consistent throat clear w/ every consistency trialed. Pt recommended NPO until further evaluation can be completed. Meds okay in pudding.              Time Calculation:         Time Calculation- SLP       12/15/17 1634          Time Calculation- SLP    SLP Start Time 1545  -VW      SLP Received On 12/15/17  -        User Key  (r) = Recorded By, (t) = Taken By, (c) = Cosigned By    Initials Name Provider Type     Randa Morton MA, CFY-SLP Speech and Language Pathologist          Therapy Charges for Today     Code Description Service Date Service Provider Modifiers Qty    71884449766 HC ST EVAL ORAL PHARYNG SWALLOW 4 12/15/2017 Randa Morton MA, CFY-SLP GN 1    25239000527 HC ST SWALLOWING CURRENT STATUS 12/15/2017 Randa Morton MA, CFY-SLP GN, CN 1    88512241896 HC ST SWALLOWING PROJECTED 12/15/2017 Randa Morton MA, CFY-SLP NYLA, CI 1          SLP G-Codes  Functional Limitations: Swallowing  Swallow Current Status (): 100 percent impaired, limited or restricted  Swallow Goal Status (): At least 1 percent but less than 20 percent impaired, limited or restricted    Randa Morton MA, CFY-SLP  12/15/2017  "

## 2017-12-15 NOTE — CONSULTS
[facility] Medicine Services  HISTORY AND PHYSICAL    Patient Name: Reza Mcclelland  : 1955  MRN: [id number]  Primary Care Physician: EMILY Romo    Subjective   Subjective     Chief Complaint:shortness of breath      HPI:  Reza Mcclelland is a 62 y.o. male with complicated vascular history who was recently hospitalized in November in [place] for respiratory failure and NSTEMI. Again he was admitted in [facility] for shortness of breath and left facial droop.  Currently patient is without complaint. Is admitted for further evaluation of his shortness of breath.  He denies fever, chills, trouble swallowing or choking on food. He does drool. His speech is slurred.  He denies palpitations, sweats, bleeding and  Otherwise ROS is negative    Review of Systems   Otherwise complete 10-system ROS is negative except as mentioned in the HPI.    Personal History     Past Medical History:   Diagnosis Date   • Acute on chronic respiratory failure     Recent acute-on-chronic respiratory failure with hospital admission for pneumonia on ventilator.   • AICD (automatic cardioverter/defibrillator) present    • Atrial fibrillation     Atrial fibrillation on presentation to outside hospital converted to sinus with Cardizem drip and subsequent recurrence of atrial fibrillation while in the ICU, treated with amiodarone and returned to sinus, 2014.   • CAD (coronary artery disease)    • Cerebrovascular accident     Cerebrovascular accident and initiation of warfarin therapy, 2013.   • Chronic kidney disease (CKD), stage III (moderate)    • Dyslipidemia    • History of hypertension    • Ischemic cardiomyopathy    • Ischemic heart disease    • Left bundle branch block     Left bundle branch block, probably combined ischemic/nonischemic cardiomyopathy.   • Peripheral vascular disease        Past Surgical History:   Procedure Laterality Date   • AXILLARY AXILLARY BYPASS     •  CAROTID STENT         Family History: family history includes Cancer in his brother; Dementia in his mother; Heart disease in his father; Prostate cancer in his father.     Social History:  reports that he has quit smoking. His smoking use included Cigarettes. He has never used smokeless tobacco. He reports that he does not drink alcohol or use illicit drugs.    Medications:  Prescriptions Prior to Admission   Medication Sig Dispense Refill Last Dose   • apixaban (ELIQUIS) 2.5 MG tablet tablet Take 2.5 mg by mouth 2 (Two) Times a Day.      • amiodarone (PACERONE) 200 MG tablet Take 1 tablet by mouth Daily. 90 tablet 2 Taking   • atorvastatin (LIPITOR) 20 MG tablet take 1 tablet by mouth at bedtime (Patient not taking: Reported on 12/15/2017) 30 tablet 9 Not Taking   • atorvastatin (LIPITOR) 80 MG tablet Take 80 mg by mouth Every Night.      • carvedilol (COREG) 6.25 MG tablet Take 1 tablet by mouth 2 (Two) Times a Day With Meals. 60 tablet 9 Taking   • cefuroxime (CEFTIN) 500 MG tablet Take 500 mg by mouth 2 (Two) Times a Day.      • doxycycline (VIBRAMYICN) 100 MG tablet Take 100 mg by mouth 2 (Two) Times a Day.      • [id number] 3-Phytoster (ANIMI-3/VITAMIN D PO) Take  by mouth Daily.   Not Taking   • furosemide (LASIX) 20 MG tablet take 1 tablet by mouth twice a day 60 tablet 9 Taking   • omeprazole (priLOSEC) 40 MG capsule take 1 capsule by mouth once daily 30 capsule 5 Taking   • potassium chloride (K-DUR,KLOR-CON) 20 MEQ CR tablet take 1 tablet by mouth once daily (Patient taking differently: 2 a week) 30 tablet 5 Taking   • predniSONE (DELTASONE) 10 MG tablet Take 10 mg by mouth Daily. 3 tablets once daily with breakfast      • spironolactone (ALDACTONE) 25 MG tablet take 1 tablet by mouth once daily 90 tablet 1 Taking   • ticagrelor (BRILINTA) 90 MG tablet tablet Take 90 mg by mouth 2 (Two) Times a Day.      • VENTOLIN  (90 BASE) MCG/ACT inhaler As Needed.  0 Not Taking       No Known  Allergies    Objective   Objective     Vital Signs:   Temp:  [97.7 °F (36.5 °C)] 97.7 °F (36.5 °C)  Heart Rate:  [70-81] 70  Resp:  [18] 18  BP: ()/(64-80) 92/71   Physical Exam   Patient is alert and talkative in no distress at rest  Neck is without mass or JVD  Left facial droop, tongue points to the left.  Heart is Reg wo murmur  Lungs are clear wo wheeze or crackle  Abd is soft without HSM or mass, not tender or distended, obese  MAEW  Skin is without rash  Neurologic exam in nonfocal   Mood is appropriate    Results Reviewed:  I have personally reviewed current lab, radiology, and data and agree.      Results from last 7 days  Lab Units 12/15/17  1251   WBC 10*3/mm3 18.08*   HEMOGLOBIN g/dL 16.7   HEMATOCRIT % 49.3   PLATELETS 10*3/mm3 231   INR  1.14       Results from last 7 days  Lab Units 12/15/17  1251   SODIUM mmol/L 137   POTASSIUM mmol/L 4.1   CHLORIDE mmol/L 98*   CO2 mmol/L 27.0   BUN mg/dL 30*   CREATININE mg/dL 1.40*   GLUCOSE mg/dL 128*   CALCIUM mg/dL 9.2   ALT (SGPT) U/L 23   AST (SGOT) U/L 17     BNP   Date Value Ref Range Status   12/15/2017 637.0 (H) 0.0 - 100.0 pg/mL Final     Imaging Results (last 24 hours)     Procedure Component Value Units Date/Time    XR Chest 1 View [[id number]] Updated:  12/15/17 1333            Assessment/Plan   Assessment / Plan       Active Problems:    Ischemic heart disease    Ischemic cardiomyopathy    Peripheral vascular disease    Cerebrovascular accident    COPD    AICD (automatic cardioverter/defibrillator) present    Carotid occlusion, left    Assessment & Plan:  63 yo with HO CAD with multiple stents now presenting with recurrent smothering spells.  They could be related to CHF and flash pulmonary edema.  He could also have COPD/cold induced asthma, VC dysfunction.  Will get PFTs, try scheduled nebs,  Await studies.  I do not see evidence of pneumonia on PCXR  He is quite hypotensive today. May need some fluid back.      DVT prophylaxis:    Anticoagulants     Start     Dose/Rate Route Frequency Ordered Stop    12/15/17 2100  apixaban (ELIQUIS) tablet 2.5 mg      2.5 mg Oral Every 12 Hours Scheduled 12/15/17 1249          CODE STATUS:full    Admission Status: obs status for now      Kenia Dash MD 12/15/17 3:10 PM

## 2017-12-15 NOTE — PLAN OF CARE
Problem: Patient Care Overview (Adult)  Goal: Plan of Care Review  Outcome: Ongoing (interventions implemented as appropriate)  Goal: Adult Individualization and Mutuality  Outcome: Ongoing (interventions implemented as appropriate)  Goal: Discharge Needs Assessment  Outcome: Ongoing (interventions implemented as appropriate)    Problem: Fall Risk (Adult)  Goal: Identify Related Risk Factors and Signs and Symptoms  Outcome: Ongoing (interventions implemented as appropriate)  Goal: Absence of Falls  Outcome: Ongoing (interventions implemented as appropriate)    Problem: Cardiac: Heart Failure (Adult)  Goal: Signs and Symptoms of Listed Potential Problems Will be Absent or Manageable (Cardiac: Heart Failure)  Outcome: Ongoing (interventions implemented as appropriate)

## 2017-12-15 NOTE — CONSULTS
Neurology    Referring Provider: DAKOTA Acevedo    Reason for Consultation: stroke      Chief complaint: shortness of breath, facial droop    Subjective .     History of present illness:  Mr. Mcclelland is a pleasant 62-year-old male with a past medical history significant for CAD, ischemic cardiomyopathy, A. fib with AICD, hypertension, dyslipidemia who is admitted to the cardiology service for shortness of breath and evaluation of ischemic cardiomyopathy.  He states that he was down in [place] on vacation and had difficulty breathing.  He reports he was taken to [facility] where he was evaluated and ended up having some coronary stents placed.  He then later drove back to [place] where he went to the visitation after the passing of his brother.  He developed acute shortness of breath and went to an outside hospital ED where he was told he had a stroke.  At that time, he complained of left lower facial droop and drooling out of that side of his mouth.  He denied any visual disturbance or extremity motor or somatosensory involvement.  He did complain of some mildly slurred speech however.    Review of Systems: Positive for facial droop and slurred speech and shortness of breath.Otherwise complete review of systems was discussed with the patient and found to be negative except for that mentioned in history of present illness or in the initial H&P dated 12/15/2017    History  Past Medical History:   Diagnosis Date   • Acute on chronic respiratory failure     Recent acute-on-chronic respiratory failure with hospital admission for pneumonia on ventilator.   • AICD (automatic cardioverter/defibrillator) present    • Atrial fibrillation     Atrial fibrillation on presentation to outside hospital converted to sinus with Cardizem drip and subsequent recurrence of atrial fibrillation while in the ICU, treated with amiodarone and returned to sinus, August 2014.   • CAD (coronary artery disease)    •  Cerebrovascular accident     Cerebrovascular accident and initiation of warfarin therapy, March 2013.   • Chronic kidney disease (CKD), stage III (moderate)    • Dyslipidemia    • History of hypertension    • Ischemic cardiomyopathy    • Ischemic heart disease    • Left bundle branch block     Left bundle branch block, probably combined ischemic/nonischemic cardiomyopathy.   • Peripheral vascular disease    ,   Past Surgical History:   Procedure Laterality Date   • AXILLARY AXILLARY BYPASS     • CAROTID STENT     ,   Family History   Problem Relation Age of Onset   • Dementia Mother    • Prostate cancer Father    • Heart disease Father    • Cancer Brother    ,   Social History   Substance Use Topics   • Smoking status: Former Smoker     Types: Cigarettes   • Smokeless tobacco: Never Used      Comment: Pt quit smoking 6-7 months ago   • Alcohol use No      Comment: Pt says he smokes a cigarette sometimes   ,   Prescriptions Prior to Admission   Medication Sig Dispense Refill Last Dose   • apixaban (ELIQUIS) 2.5 MG tablet tablet Take 2.5 mg by mouth 2 (Two) Times a Day.      • amiodarone (PACERONE) 200 MG tablet Take 1 tablet by mouth Daily. 90 tablet 2 Taking   • atorvastatin (LIPITOR) 20 MG tablet take 1 tablet by mouth at bedtime (Patient not taking: Reported on 12/15/2017) 30 tablet 9 Not Taking   • atorvastatin (LIPITOR) 80 MG tablet Take 80 mg by mouth Every Night.      • carvedilol (COREG) 6.25 MG tablet Take 1 tablet by mouth 2 (Two) Times a Day With Meals. 60 tablet 9 Taking   • cefuroxime (CEFTIN) 500 MG tablet Take 500 mg by mouth 2 (Two) Times a Day.      • doxycycline (VIBRAMYICN) 100 MG tablet Take 100 mg by mouth 2 (Two) Times a Day.      • [id number]-T5-[id number]-D-Omega 3-Phytoster (ANIMI-3/VITAMIN D PO) Take  by mouth Daily.   Not Taking   • furosemide (LASIX) 20 MG tablet take 1 tablet by mouth twice a day 60 tablet 9 Taking   • omeprazole (priLOSEC) 40 MG capsule take 1 capsule by mouth once daily 30 capsule 5  "Taking   • potassium chloride (K-DUR,KLOR-CON) 20 MEQ CR tablet take 1 tablet by mouth once daily (Patient taking differently: 2 a week) 30 tablet 5 Taking   • predniSONE (DELTASONE) 10 MG tablet Take 10 mg by mouth Daily. 3 tablets once daily with breakfast      • spironolactone (ALDACTONE) 25 MG tablet take 1 tablet by mouth once daily 90 tablet 1 Taking   • ticagrelor (BRILINTA) 90 MG tablet tablet Take 90 mg by mouth 2 (Two) Times a Day.      • VENTOLIN  (90 BASE) MCG/ACT inhaler As Needed.  0 Not Taking    and Allergies:  Review of patient's allergies indicates no known allergies.    Objective     Vital Signs   Blood pressure 92/71, pulse 70, temperature 97.7 °F (36.5 °C), temperature source Oral, resp. rate 18, height 172.7 cm (68\"), weight 88.9 kg (196 lb), SpO2 97 %.    Physical Exam:      Gen: Lying in bed with eyes open. In NAD. Appears stated age   Eyes: PERRL, conjuntivae/lids normal   ENT: External canals normal bilaterally. Dentition normal.    Neck: Supple. No thyroid enlargement noted   Respiratory: CTA bilaterally. Respirations unlabored   CV: RRR, S1 and S2 nml. Radial pulses 2+ bilaterally.    Skin: No rashes noted on exposed skin. Normal tugor.   MSK: Normal bulk and tone. Nml ROM     Neurologic:   Mental status: Awake, alert, oriented x4. Follows commands. Speech mildly slurred.    CN: PERRL, EOM intact, sensation intact in upper/mid/lower face bilaterally, left lower facial droop, hearing intact to finger rub bilaterally, palate elevates symmetrically, tongue movements and SCMs strong bilaterally    Motor: Strength full (5/5) throughout in BUE and BLE    Reflexes: 2+ throughout    Sensory: Intact to LT throughout   Coordination: No dysmetria noted   Gait: Not tested        Results Reviewed:     Labs reviewed   EKG report reviewed  Chest x-ray report reviewed                     Assessment/Plan     1. Left facial droop = concerning for ischemic stroke. Unable to get MRI due to his AICD. " Will get CT head without contrast. Lipid profile and A1c in AM. Agree with TTE and carotid duplex, currently pending. Rehab eval. Continue Eliquis, Brilinta, and atorvastatin.    2. Hypertension = continue meds    3.  Hyperlipidemia = on atorvastatin. Lipid profile in AM    4.  A. Fib = in paced rhythm. On Eliquis    5.  Ischemic cardiomyopathy = on Eliquis, Brilinta, and atorvastatin        Janey Wang MD  12/15/17  2:33 PM

## 2017-12-15 NOTE — PLAN OF CARE
"Problem: Patient Care Overview (Adult)  Goal: Plan of Care Review  Outcome: Ongoing (interventions implemented as appropriate)    12/15/17 8239   Coping/Psychosocial Response Interventions   Plan Of Care Reviewed With patient   Patient Care Overview   Progress (initial eval)   Outcome Evaluation   Outcome Summary/Follow up Plan Clinical dysphagia evaluation completed this pm: Pt w/ hx stroke, per chart. Pt reported having modified diet during previous hospitalization (\"ground food\"), denied any liquid consistency change or swallowing difficulty. Imaging pending at time of evaluation. Pt trialed thin via cup/straw, nectar-thick, and puree. Consistent throat clear w/ every consistency trialed. Pt recommended NPO until further evaluation can be completed. Meds okay in pudding.            "

## 2017-12-15 NOTE — PROGRESS NOTES
Subjective:     Encounter Date:12/15/2017    Patient ID: Reza Mcclelland is a 62 y.o. single white male, retired [employer] , from [place], [place].       PHYSICIAN: Rolanda Billingsley MD/EMILY Romo  NEUROLOGIST: Jonas Quiroz MD   CARDIOVASCULAR SURGEON: Guillaume Pruitt MD, [facility]  INTERVENTIONAL CARDIOLOGIST: Ismael Nguyen MD, [facility]/ Oliverio Lemus MD, [facility]   ELECTROPHYSIOLOGIST: Joshua Jasmine MD, [facility], [facility]/Abhishek Kong MD, [facility]: DAKOTA Peters  ENDOCRINOLOGIST: unknown       Chief Complaint:   Chief Complaint   Patient presents with   • ischemic heart disease   • electrolyte imbalance     Problem List:  1. Ischemic heart disease/ischemic cardiomyopathy:  a. Remote non-ST-elevation myocardial infarction with emergent left heart catheterization and successful percutaneous transluminal coronary angioplasty and stenting of the ramus intermedius with a 3.0 x 12 mm Xience drug-eluting stent, 11/19/2008.  b. Left heart catheterization revealing severe stenosis in the LAD of about 80% to 90% in the mid portion with 2.5 x 28 mm, 3.0 x 28 mm, and 3.5 x 18 mm Xience drug-eluting stents in the mid and proximal portion of the LAD, critical left anterior descending stenosis with recommendations to return in 2 weeks for further intervention, 12/04/2008.  c. Left heart catheterization revealing critical right coronary artery stenosis with 3.5 x 28 mm Xience drug-eluting stent deployed in the mid RCA and Xience drug-eluting stent deployed in the proximal RCA, also revealing severe peripheral vascular disease with critical bilateral stenosis of common iliac artery with recommendations for the patient to remain on Plavix and aspirin and further evaluation of iliac stenosis, 12/18/2008.  d. Recurrent CCS class II-III angina with subsequent left heart catheterization and stenting with a 3.5 x 15 mm Xience drug-eluting  stent to the proximal LAD and 2.5 x 18 mm Xience drug-eluting stent into the distal LAD with widely patent stents in the RCA, 90% lesion in the stent of the LAD and widely patent stent of the diagonal branch. LVEF (0.65) with no wall motion abnormalities. Severe peripheral arterial disease with 60% stenosis of the left common iliac artery and 80% to 90% stenosis in the distal common femoral artery with recommendations for abdominal aortogram with right and left lower extremity arteriogram, 01/21/2011.  e. Cardiac catheterization with percutaneous transluminal angioplasty and self-expanding stent placement to the proximal innominate artery, distal embolic protection filter placement in the right carotid artery for cerebral protection during procedure, 10 x 20 mm Xact self-expanding stent deployed to the proximal innominate stenosis, total occlusion of the proximal left internal carotid artery, 11/29/2011.  f. Markedly reduced echocardiographic LVEF (0.25) with left ventricular chamber enlargement and thickened mitral valve leaflets with moderate MR in the absence of pericardial effusion or intracavitary thrombus/mass with markedly abnormal EKG demonstrating LBBB with marked QRS widening, spring 2013.  g. Recurrent progressive chest pain syndrome with severe single-vessel obstructive coronary disease and 100% total occlusion proximal right coronary artery at site of previously placed stent with mild nonobstructive plaque in the left coronary artery system and previously placed stents in the LAD, ramus intermedius artery patent with left ventriculography deferred and moderate bilateral common iliac disease, April 2013, with subsequent bi-ventricular AICD implant ([employer], model #[id number]) by Dr. Kong.  h. Acute non-ST elevation myocardial infarction/left heart catheterization, Dr. Lemus, with Xience drug-eluting stent to the ramus intermedius: Nonobstructive 50% to 60% mid to distal left anterior  descending stenosis. Chronic total occlusion of the dominant right coronary artery served by left-sided collaterals with left ventricular systolic dysfunction. EF 10% to 15%, 08/20/2014.  i. Biventricular ICD interrogation 7/7/17; RA pacing 72%, RV pacing 96% longevity 1.8 years, mode switch less than 1%, longest was 7 hours 5/9/17, 1 noise reversion both channels  j. Residual CCS class I angina pectoris/NYHA class II-III exertional dyspnea and fatigue.   k. Recent apparent acute congestive heart failure with non-STEMI and possible bronchopneumonia with respiratory failure requiring intubation and apparent diagnostic coronary angiography with 3 stents placed with subsequent apparent AMA discharge - data deficit, [facility], [place], [place], November 2017.  l. Residual CCS class I angina pectoris/NYHA class III-IV biventricular CHF, December 2017.  2. Peripheral vascular disease:  a. Cardiac catheterization revealing total occlusion of left internal carotid artery previously in 2011.  b. CT angiogram of the neck showing total occlusion of the left internal carotid artery, 50% to 60% stenosis of the right carotid bulb, stent in the brachiocephalic artery placed by Dr. Santamaria in November 2011, totally occluded, March 2013.  c. Occluded left carotid artery with occluded innominate artery and intracranial circulation via thyroid artery collaterals and left vertebral with left axillary to right axillary bypass graft using 8 mm. PTFE ringed Propaten graft with arteriogram to the right subclavian artery and right axillary artery and right carotid artery, April 2013.  d. Recent hospitalization, [facility], with congestive heart failure and stroke - data deficit (December 2017), with apparent acute injury noted.  3. Cerebrovascular accident and initiation of warfarin therapy, March 2013.  4. Atrial fibrillation on presentation to outside hospital converted to sinus with  Cardizem drip and subsequent recurrence of atrial fibrillation while in the ICU, treated with amiodarone and returned to sinus, August 2014.  5. Dyslipidemia.  6. Stage 3 chronic kidney disease.   7. Left bundle branch block, probably combined ischemic/nonischemic cardiomyopathy.  8. Remote acute-on-chronic respiratory failure with hospital admission for pneumonia on ventilator.  9. History of hypertension.  10. Tobacco abuse, previously resolved but recurrent, with cessation using Chantix, winter of 2015.  11. No prior additional surgical intervention.   12. Progressive erectile dysfunction.  13. Serial acceptable AICD interrogations: February 2014; August 2014; March 2015, July 2017, with home Merlin monitoring.  14. Hyperthyroidism with abnormal thyroid ultrasound, finding thyroid nodule-data deficit.  2017      No Known Allergies      Current Outpatient Prescriptions:   •  amiodarone (PACERONE) 200 MG tablet, Take 1 tablet by mouth Daily., Disp: 90 tablet, Rfl: 2  •  Apixaban (ELIQUIS PO), Take 5 mg by mouth Daily. Pt takes 0.5 tablet bid, Disp: , Rfl:   •  atorvastatin (LIPITOR) 80 MG tablet, Take 80 mg by mouth Every Night., Disp: , Rfl:   •  carvedilol (COREG) 6.25 MG tablet, Take 1 tablet by mouth 2 (Two) Times a Day With Meals., Disp: 60 tablet, Rfl: 9  •  cefuroxime (CEFTIN) 500 MG tablet, Take 500 mg by mouth 2 (Two) Times a Day., Disp: , Rfl:   •  doxycycline (VIBRAMYICN) 100 MG tablet, Take 100 mg by mouth 2 (Two) Times a Day., Disp: , Rfl:   •  furosemide (LASIX) 20 MG tablet, take 1 tablet by mouth twice a day, Disp: 60 tablet, Rfl: 9  •  omeprazole (priLOSEC) 40 MG capsule, take 1 capsule by mouth once daily, Disp: 30 capsule, Rfl: 5  •  potassium chloride (K-DUR,KLOR-CON) 20 MEQ CR tablet, take 1 tablet by mouth once daily (Patient taking differently: 2 a week), Disp: 30 tablet, Rfl: 5  •  predniSONE (DELTASONE) 10 MG tablet, Take 10 mg by mouth Daily. 3 tablets once daily with breakfast, Disp: ,  "Rfl:   •  spironolactone (ALDACTONE) 25 MG tablet, take 1 tablet by mouth once daily, Disp: 90 tablet, Rfl: 1  •  ticagrelor (BRILINTA) 90 MG tablet tablet, Take 90 mg by mouth 2 (Two) Times a Day., Disp: , Rfl:         HISTORY OF PRESENT ILLNESS: Patient returns for scheduled 5-month followup. He is not doing well today.  He says that he retired at the end of 2017/beginning of 2017.  He did very well and was asymptomatic, and then, they went on vacation to [place], [place] and hiked throughout the [place] without difficulty or complaint, and then after taking a hot shower, abruptly developed air hunger and \"I couldn't breathe.\"  His significant other, who accompanies him, says that he was airlifted to the hospital in [place].  They were told he had had a heart attack, and he was in the hospital from approximately 2017 to 2017.  He was told that they put 3 stents in and \"opened 2 back up.\"  Apparently, he left [place], but we have no records to review.  Then, they drove back to [place], and he was home for about a week, and his brother  in [facility], and he went to the visitation and was with another brother and developed acute shortness of breath, so he went to [facility] ED, and he was told that he had had a stroke.  He was in the hospital from , 2017, until Wednesday, 2017. The patient states that he feels \"pretty good\" since getting out of the hospital on 2017; he just has difficulty with speech and drooling from the side of his mouth; he also notes marked tachypnea, dyspnea, fatigue, and nonproductive cough and feels short of breath even at rest and is unable to tolerate even minimal activity.  We have not received any records from [facility] or [facility]; his girlfriend says that she signed a release to have them sent, but they have not been received.  He currently denies fever, chills, abdominal pain, " "nausea, emesis, or headache but has had continued slurred speech and facial weakness with drooling.        Review of Systems   Constitution: Positive for decreased appetite, weakness, malaise/fatigue and weight loss.   Cardiovascular: Positive for irregular heartbeat.   Respiratory: Positive for cough, shortness of breath, sleep disturbances due to breathing and sputum production.    Endocrine: Positive for polydipsia.   Hematologic/Lymphatic: Bruises/bleeds easily.   Gastrointestinal: Positive for flatus.   Genitourinary: Positive for frequency.   Neurological: Positive for brief paralysis, focal weakness and loss of balance.        Facial weakness      Obtained and otherwise negative except as outlined in problem list and HPI.      ECG 12 Lead  Date/Time: 12/15/2017 10:46 AM  Performed by: KIMI OLGUIN  Authorized by: KIMI OLGUIN   Comparison: not compared with previous ECG   Rhythm: paced  BPM: 69  Clinical impression: abnormal ECG               Objective:       Vitals:    12/15/17 0931 12/15/17 0937 12/15/17 0938 12/15/17 0939   BP: (!) 87/64 108/75 109/72 115/73   BP Location: Right arm Right arm Left arm Left arm   Patient Position: Standing Sitting Standing Sitting   Pulse: 81 70 73 70   Weight: 90.4 kg (199 lb 6.4 oz)      Height: 172.7 cm (68\")        Body mass index is 30.32 kg/(m^2).   Last weight: 208 lbs.    Physical Exam   Constitutional: He is oriented to person, place, and time. He appears well-developed and well-nourished.   Neck: No JVD present. Carotid bruit is present (bilateral). No thyromegaly present.   Cardiovascular: S1 normal and S2 normal.  An irregular rhythm present. Exam reveals no gallop, no S3 and no friction rub.    Murmur heard.   Medium-pitched early systolic murmur is present with a grade of 2/6  at the lower left sternal border  Pulses:       Carotid pulses are 1+ on the right side, and 1+ on the left side.       Radial pulses are 1+ on the right side, and 1+ on the left " side.        Femoral pulses are 1+ on the right side, and 1+ on the left side.       Popliteal pulses are 1+ on the right side, and 1+ on the left side.        Dorsalis pedis pulses are 1+ on the right side, and 1+ on the left side.        Posterior tibial pulses are 1+ on the right side, and 1+ on the left side.   Pulmonary/Chest: Effort normal. He has decreased breath sounds. He has wheezes. He has no rhonchi. He has rales (bibasilar).   Abdominal: Soft. He exhibits no mass. There is no hepatosplenomegaly. There is no tenderness. There is no guarding.   Bowel sounds audible x4   Musculoskeletal: Normal range of motion. He exhibits edema (1+ bipedal).   Lymphadenopathy:     He has no cervical adenopathy.   Neurological: He is alert and oriented to person, place, and time.   Left facial droop with mild right upper extremity weakness   Skin: Skin is warm, dry and intact. No rash noted.   Vitals reviewed.        Lab Review:   Lab Results   Component Value Date    GLUCOSE 85 03/30/2015    BUN 19 03/30/2015    CREATININE 1.6 (H) 03/30/2015    CO2 27 03/30/2015    CALCIUM 9.3 03/30/2015    ALBUMIN 4.3 11/20/2014    AST 15 11/20/2014    ALT 12 11/20/2014       Lab Results   Component Value Date    WBC 11.72 (H) 03/30/2015    HGB 17.3 03/30/2015    HCT 48.8 03/30/2015    MCV 85.8 03/30/2015     03/30/2015       Lab Results   Component Value Date    HGBA1C 5.1 08/20/2014       Lab Results   Component Value Date    TRIG 65 08/20/2014     Lab Results   Component Value Date    HDL 23 (L) 08/20/2014       Lab Results   Component Value Date     (H) 03/30/2015           Assessment:   Suboptimal course with recent apparent flash pulmonary edema and non-STEMI with intervention followed by delayed stroke; we have no records available to review.  He appears to have decompensated CHF and still has significant residual effects from his stroke and apparently has significant left internal carotid artery stenosis with  apparent recent documented acute kidney injury.  I feel the best option is hospitalization to attempt to sort through his recent illnesses and attempt to optimize his medical therapy.  He has had significant decline in health since his last evaluation 5 months ago.       Diagnosis Plan   1. Ischemic heart disease  Admit to Telemetry with echocardiogram and initiate IV Lasix therapy   2. Ischemic cardiomyopathy  Assess echocardiogram   3. Cerebrovascular accident (CVA), unspecified mechanism  Continue current medications and solicit Neurology opinion   4. Paroxysmal atrial fibrillation  Currently AV paced rhythm   5. Chronic kidney disease (CKD), stage III (moderate)  No data to review   6. Dyslipidemia  No data to review          Plan:         1. Admit to Telemetry.  2. Neurology and hospitalist consultations.  3. Echocardiogram.  4. Carotid duplex arterial study.  5. Continue current medications and add IV Lasix 40 mg bid.  6. The following laboratory studies are requested:   A. CMP   B. CBC   C. Magnesium level   D. BNP   E. Chest x-ray   F. TSH   G. PT-INR/PTT   H. FLP  7.   Probable nasal oxygen therapy and duo-neb treatment pending initial chest x-ray and assessment.  8.   Attempt to obtain records from [facility] and [facility].  9.   Potential trial of Entresto if renal function and blood pressure tolerates.       Transcribed by Jovanna Lo for Dr. Andrea Guevara at 9:41 AM on 12/15/2017      I, Andrea Guevara MD, [facility], personally performed the services described in this documentation as scribed by the above named individual in my presence, and it is both accurate and complete. At 10:49 AM on 12/15/2017

## 2017-12-16 PROBLEM — T17.900A SILENT ASPIRATION: Status: ACTIVE | Noted: 2017-12-16

## 2017-12-16 PROBLEM — I50.23 ACUTE ON CHRONIC SYSTOLIC HEART FAILURE: Status: ACTIVE | Noted: 2017-12-16

## 2017-12-16 PROBLEM — D72.829 LEUKOCYTOSIS: Status: ACTIVE | Noted: 2017-12-16

## 2017-12-16 PROBLEM — I50.21 ACUTE SYSTOLIC HEART FAILURE (HCC): Status: ACTIVE | Noted: 2017-12-16

## 2017-12-16 LAB
ANION GAP SERPL CALCULATED.3IONS-SCNC: 9 MMOL/L (ref 3–11)
ARTICHOKE IGE QN: 79 MG/DL (ref 0–130)
BUN BLD-MCNC: 32 MG/DL (ref 9–23)
BUN/CREAT SERPL: 21.3 (ref 7–25)
CALCIUM SPEC-SCNC: 9.3 MG/DL (ref 8.7–10.4)
CHLORIDE SERPL-SCNC: 98 MMOL/L (ref 99–109)
CHOLEST SERPL-MCNC: 129 MG/DL (ref 0–200)
CO2 SERPL-SCNC: 30 MMOL/L (ref 20–31)
CREAT BLD-MCNC: 1.5 MG/DL (ref 0.6–1.3)
DEPRECATED RDW RBC AUTO: 43.4 FL (ref 37–54)
ERYTHROCYTE [DISTWIDTH] IN BLOOD BY AUTOMATED COUNT: 13.2 % (ref 11.3–14.5)
GFR SERPL CREATININE-BSD FRML MDRD: 47 ML/MIN/1.73
GLUCOSE BLD-MCNC: 102 MG/DL (ref 70–100)
HBA1C MFR BLD: 7.2 % (ref 4.8–5.6)
HCT VFR BLD AUTO: 51.3 % (ref 38.9–50.9)
HDLC SERPL-MCNC: 39 MG/DL (ref 40–60)
HGB BLD-MCNC: 17.5 G/DL (ref 13.1–17.5)
MCH RBC QN AUTO: 31.1 PG (ref 27–31)
MCHC RBC AUTO-ENTMCNC: 34.1 G/DL (ref 32–36)
MCV RBC AUTO: 91.3 FL (ref 80–99)
PLATELET # BLD AUTO: 215 10*3/MM3 (ref 150–450)
PMV BLD AUTO: 10 FL (ref 6–12)
POTASSIUM BLD-SCNC: 3.5 MMOL/L (ref 3.5–5.5)
RBC # BLD AUTO: 5.62 10*6/MM3 (ref 4.2–5.76)
SODIUM BLD-SCNC: 137 MMOL/L (ref 132–146)
TRIGL SERPL-MCNC: 163 MG/DL (ref 0–150)
WBC NRBC COR # BLD: 16.82 10*3/MM3 (ref 3.5–10.8)

## 2017-12-16 PROCEDURE — G0378 HOSPITAL OBSERVATION PER HR: HCPCS

## 2017-12-16 PROCEDURE — 25010000002 FUROSEMIDE PER 20 MG: Performed by: NURSE PRACTITIONER

## 2017-12-16 PROCEDURE — 96376 TX/PRO/DX INJ SAME DRUG ADON: CPT

## 2017-12-16 PROCEDURE — 99225 PR SBSQ OBSERVATION CARE/DAY 25 MINUTES: CPT | Performed by: INTERNAL MEDICINE

## 2017-12-16 PROCEDURE — 63710000001 PREDNISONE PER 5 MG: Performed by: NURSE PRACTITIONER

## 2017-12-16 PROCEDURE — 94640 AIRWAY INHALATION TREATMENT: CPT

## 2017-12-16 PROCEDURE — 94760 N-INVAS EAR/PLS OXIMETRY 1: CPT

## 2017-12-16 PROCEDURE — 80061 LIPID PANEL: CPT | Performed by: NURSE PRACTITIONER

## 2017-12-16 PROCEDURE — 94799 UNLISTED PULMONARY SVC/PX: CPT

## 2017-12-16 PROCEDURE — 99213 OFFICE O/P EST LOW 20 MIN: CPT | Performed by: PSYCHIATRY & NEUROLOGY

## 2017-12-16 PROCEDURE — 92612 ENDOSCOPY SWALLOW (FEES) VID: CPT

## 2017-12-16 PROCEDURE — 80048 BASIC METABOLIC PNL TOTAL CA: CPT | Performed by: NURSE PRACTITIONER

## 2017-12-16 PROCEDURE — 83036 HEMOGLOBIN GLYCOSYLATED A1C: CPT | Performed by: PSYCHIATRY & NEUROLOGY

## 2017-12-16 PROCEDURE — 85027 COMPLETE CBC AUTOMATED: CPT | Performed by: NURSE PRACTITIONER

## 2017-12-16 RX ORDER — NICOTINE 21 MG/24HR
1 PATCH, TRANSDERMAL 24 HOURS TRANSDERMAL EVERY 24 HOURS
Status: DISCONTINUED | OUTPATIENT
Start: 2017-12-16 | End: 2017-12-18 | Stop reason: HOSPADM

## 2017-12-16 RX ADMIN — IPRATROPIUM BROMIDE AND ALBUTEROL SULFATE 3 ML: .5; 3 SOLUTION RESPIRATORY (INHALATION) at 16:42

## 2017-12-16 RX ADMIN — TICAGRELOR 90 MG: 90 TABLET ORAL at 09:11

## 2017-12-16 RX ADMIN — FUROSEMIDE 20 MG: 10 INJECTION, SOLUTION INTRAMUSCULAR; INTRAVENOUS at 14:02

## 2017-12-16 RX ADMIN — IPRATROPIUM BROMIDE AND ALBUTEROL SULFATE 3 ML: .5; 3 SOLUTION RESPIRATORY (INHALATION) at 12:19

## 2017-12-16 RX ADMIN — SPIRONOLACTONE 25 MG: 25 TABLET, FILM COATED ORAL at 09:11

## 2017-12-16 RX ADMIN — ATORVASTATIN CALCIUM 80 MG: 40 TABLET, FILM COATED ORAL at 21:55

## 2017-12-16 RX ADMIN — APIXABAN 2.5 MG: 2.5 TABLET, FILM COATED ORAL at 21:55

## 2017-12-16 RX ADMIN — PANTOPRAZOLE SODIUM 40 MG: 40 TABLET, DELAYED RELEASE ORAL at 06:51

## 2017-12-16 RX ADMIN — IPRATROPIUM BROMIDE AND ALBUTEROL SULFATE 3 ML: .5; 3 SOLUTION RESPIRATORY (INHALATION) at 20:35

## 2017-12-16 RX ADMIN — AMIODARONE HYDROCHLORIDE 200 MG: 200 TABLET ORAL at 09:11

## 2017-12-16 RX ADMIN — FUROSEMIDE 20 MG: 10 INJECTION, SOLUTION INTRAMUSCULAR; INTRAVENOUS at 02:46

## 2017-12-16 RX ADMIN — IPRATROPIUM BROMIDE AND ALBUTEROL SULFATE 3 ML: .5; 3 SOLUTION RESPIRATORY (INHALATION) at 09:48

## 2017-12-16 RX ADMIN — TICAGRELOR 90 MG: 90 TABLET ORAL at 21:55

## 2017-12-16 RX ADMIN — CARVEDILOL 6.25 MG: 6.25 TABLET, FILM COATED ORAL at 17:20

## 2017-12-16 RX ADMIN — APIXABAN 2.5 MG: 2.5 TABLET, FILM COATED ORAL at 09:11

## 2017-12-16 RX ADMIN — NICOTINE 1 PATCH: 21 PATCH, EXTENDED RELEASE TRANSDERMAL at 17:19

## 2017-12-16 RX ADMIN — PREDNISONE 30 MG: 10 TABLET ORAL at 09:11

## 2017-12-16 NOTE — PROGRESS NOTES
[facility]  INPATIENT PROGRESS NOTE    Date of Admission: 12/15/2017  Length of Stay: 0  Primary Care Physician: EMILY Romo    Subjective     Chief Complaint: shortness of breath  HPI:  Patient states that he is feeling better today.  He had a FEES and has been cleared for everything but thin fluids.      Review Of Systems:   Review of Systems  Patient denies headaches, fever, chills, shortness of breath, chest pain, cough, nausea or vomiting, diarrhea, rash, itching or bleeding      Objective      Temp:  [97.6 °F (36.4 °C)-98.9 °F (37.2 °C)] 97.9 °F (36.6 °C)  Heart Rate:  [62-71] 71  Resp:  [16-18] 18  BP: ()/(62-72) 89/66  Physical Exam  Patient is alert and talkative in no distress at rest, left facial droop  Neck is without mass or JVD  Heart is paced wo murmur  Lungs are clear wo wheeze or crackle  Abd is soft without HSM or mass, not distended or tender  MAEW  Skin is without rash  Neurologic exam is nonfocal   Mood is appropriate      Results Review:    I have reviewed the labs, radiology results and diagnostic studies.      Results from last 7 days  Lab Units 12/16/17  0831 12/15/17  1251   WBC 10*3/mm3 16.82* 18.08*   HEMOGLOBIN g/dL 17.5 16.7   HEMATOCRIT % 51.3* 49.3   PLATELETS 10*3/mm3 215 231   INR   --  1.14       Results from last 7 days  Lab Units 12/16/17  0831 12/15/17  1251   SODIUM mmol/L 137 137   POTASSIUM mmol/L 3.5 4.1   CHLORIDE mmol/L 98* 98*   CO2 mmol/L 30.0 27.0   BUN mg/dL 32* 30*   CREATININE mg/dL 1.50* 1.40*   GLUCOSE mg/dL 102* 128*   CALCIUM mg/dL 9.3 9.2   ALT (SGPT) U/L  --  23   AST (SGOT) U/L  --  17     BNP   Date Value Ref Range Status   12/15/2017 637.0 (H) 0.0 - 100.0 pg/mL Final     Microbiology Results Abnormal     None          Imaging Results (last 24 hours)     Procedure Component Value Units Date/Time    XR Chest 1 View [[id number]] Collected:  12/15/17 1827     Updated:  12/15/17 2001    Narrative:           EXAMINATION: XR CHEST 1 VW - 12/15/2017     INDICATION: I25.9-Chronic ischemic heart disease, unspecified;  J96.20-Acute and chronic respiratory failure, unspecified whether with  hypoxia or hypercapnia.       COMPARISON: None.     FINDINGS:   1. Cardiomegaly is noted. There is a multilead ICD pacemaker  defibrillator in place from the left with a coronary sinus lead.     2. Although there is a slight increased interstitial fibrotic pattern,  there is no edema, no free fluid and no active disease.           Impression:       Cardiomegaly with mild interstitial scarring. Well-positioned pacemaker.  No evidence of edema, free fluid or consolidation.     DICTATED:     12/15/2017  EDITED:          12/15/2017        This report was finalized on 12/15/2017 7:59 PM by Dr. Dago Alba MD.       CT Head Without Contrast [[id number]] Collected:  12/16/17 1114     Updated:  12/16/17 1335    Narrative:       EXAMINATION: CT HEAD WO CONTRAST - 12/15/2017     INDICATION:  I25.9-Chronic ischemic heart disease, unspecified;  J96.20-Acute and chronic respiratory failure, unspecified whether with  hypoxia or hypercapnia.     TECHNIQUE: CT scan of the head was 45 mm intervals. No intravenous  contrast was utilized.     The radiation dose reduction device was turned on for each scan per the  ALARA (As Low as Reasonably Achievable) protocol.     COMPARISON: None.     FINDINGS: There is low density in the right basal ganglia and right  parietal region which does not have the appearance of an acute process.  There is no hemorrhage. There is no significant mass effect or edema.  There is no extra-axial fluid collection. There is no intracranial mass.       Impression:       Old infarcts in the right basal ganglia and parietal region.  There are no acute findings. There is no prior exam for comparison.     DICTATED:     12/16/2017  EDITED:          12/16/2017        This report was finalized on 12/16/2017 1:33 PM by Dr. Sam Ellis  MD.           Results for orders placed during the hospital encounter of 12/15/17   Adult Transthoracic Echo Complete W/ Cont if Necessary Per Protocol    Narrative · The left ventricular cavity is severely dilated.  · Left atrial cavity size is mild-to-moderately dilated.  · Mild aortic valve regurgitation is present.  · Moderate-to-severe mitral valve regurgitation is present  · Left ventricular systolic function is moderately decreased. Estimated EF   = 30%.  · Left ventricular wall thickness is consistent with mild concentric   hypertrophy.  · Left ventricular diastolic dysfunction (grade I a) consistent with   impaired relaxation.  · Mild tricuspid valve regurgitation is present.  · There is no evidence of pericardial effusion.  · No evidence of pulmonary hypertension is present.  · The aortic valve exhibits sclerosis.  · No IV Lumason or IV saline contrast studies performed; cannot exclude   intracardiac source of embolus.        Results for orders placed during the hospital encounter of 12/15/17   Duplex Carotid Ultrasound CAR    Narrative · Left internal carotid artery stenosis totally occluded.  · Right internal carotid artery stenosis of 0-49%.  · Proximal right internal carotid artery plaque without significant   stenosis.  · Marked reduction in right carotid artery flow velocities suggestive of   overall reduced cardiac output.  · Incidental bidirectional vertebral artery flow with reduced velocity.          I have reviewed the medications.    Assessment/Plan     Assessment/Problem List    Hospital Problem List     * (Principal)Acute on chronic systolic congestive heart failure    Overview Signed 12/16/2017 11:31 AM by Ismael Nguyen IV, MD     · Echo (12/15/17): LVEF 30%.  Severe LV dilatation.  Mild to moderate LA dilatation.  Artery to severe MR.         Coronary artery disease involving native coronary artery of native heart without angina pectoris    Overview Addendum 12/16/2017 11:28 AM  by Ismael Nguyen IV, MD     · Cardiac cath for NSTEMI (11/19/2008): PCI of ramus a 3.0 x 12 mm Xience drug-eluting stent, 11/19/2008.  · Cardiac catheter (12/4/2008): PCI to mid and proximal LAD.  Sure staged PCI of RCA planned  · Cardiac catheter (12/18/2008): PCI to RCA.  Severe peripheral vascular disease of bilateral common iliac arteries.   · Cardiac catheter for recurrent CCS class II-III angina  (01/21/2011): NACHO to proximal and distal LAD.  Widely patent stents in RCA.  LVEF normal at 65%.    · Echo (2013): LVEF 25%.  Moderate MR.  Left bundle branch block. .  · Cardiac catheter for recurrent progressive chest pain syndrome (04/2013): 100% total occlusion RCA at site of previously placed stent with mild nonobstructive plaque in the left coronary artery system and previously placed stents in the LAD, ramus intermedius artery patent. subsequent CRT-D implant ([employer], model #[id number]) by Dr. Kong.  · Cardiac cath for NSTEMI  (08/20/2014): NACHO to the ramus intermedius:  Nonobstructive 50% to 60% mid to distal left anterior descending stenosis. Chronic total occlusion of the dominant right coronary artery served by left-sided collaterals with left ventricular systolic dysfunction. LVEF 10% to 15%.         Ischemic cardiomyopathy    Overview Addendum 12/16/2017  9:17 AM by DAKOTA Caba     · Echo (spring 2013): Acutely reduced LVEF 25%.  Moderate MR.  Left bundle branch block. .  · Bi-ventricular AICD implant ([employer], model #[id number]) by Dr. Kong.  · LVEF 10% to 15% noted on cardiac cath 08/20/2014.  · Echo (12/15/17): Mild AI & TR. LVEF 30%.            Paroxysmal atrial fibrillation    Overview Addendum 12/16/2017 11:30 AM by Ismael Nguyen IV, MD     · Atrial fibrillation on presentation to outside hospital converted to sinus with Cardizem drip and subsequent recurrence of atrial fibrillation while in the ICU, treated with amiodarone and returned to  sinus, August 2014.  · Chads Vasc=6, on Eliquis         Hyperlipidemia LDL goal <70    Chronic kidney disease (CKD), stage III (moderate)    Essential hypertension    COPD    Cardiac resynchronization therapy defibrillator (CRT-D) in place    Leukocytosis    Silent aspiration        Plan  61 yo with HO CAD with recent LHC and stents   Now with several episodes of acute dyspnea-- possible related to untreated COPD and or intermittent silent aspirations or both.  Will cont nebs, start Symbicort at DC with modified diet.      DVT prophylaxis:eliquis-   Discharge Planning: I expect patient to be discharged to home tomorrow  Kenia Dash MD 12/16/17 4:38 PM

## 2017-12-16 NOTE — PLAN OF CARE
Problem: Patient Care Overview (Adult)  Goal: Plan of Care Review  Outcome: Ongoing (interventions implemented as appropriate)    12/16/17 1123   Coping/Psychosocial Response Interventions   Plan Of Care Reviewed With patient   Patient Care Overview   Progress (eval)   Outcome Evaluation   Outcome Summary/Follow up Plan FEES completed. Pt presented w/ mild oral dysphagia c/b mild anterior loss on L w/ liquid 2' reduced labial seal & incr'd oral prep w/ solid 2' reduced lingual strength/ROM. Moderate pharyngeal dysphagia c/b penetration to the level of the TVFs w/ thin liquid during the swallow 2' delayed pharyngeal swallow initiation to pyriforms. Also, there was moderate diffuse pharyngeal residue w/ all consistencies 2' reduced base of tongue retraction/post pharyngeal wall contraction and reduced hyolaryngeal movment. Pt was u/a to clear thin/nectar-thick residue before spilled over posterior commissure into laryngeal vestibule & aspiration occurred. All aspiration was silent. No penetration/aspiration w/ small sips thin w/ chin tuck, honey-thick, pudding, solid.      Suspect pt will have difficulty consistently & reliably using chin tuck so rec: dysphagia IV diet, honey-thick liquids; pt may have couple sips thin b/t meals only w/ supervision to ensure using chin tuck. Pt would benefit from dysphagia tx. Will f/u to determine if speech/language eval warranted, as well.         Problem: Inpatient SLP  Goal: Dysphagia- Patient will safely consume diet as per recommendation with no signs/symptoms of aspiration  Outcome: Ongoing (interventions implemented as appropriate)    12/16/17 1123   Safely Consume Diet   Safely Consume Diet- SLP, Date Established 12/16/17   Safely Consume Diet- SLP, Time to Achieve by discharge

## 2017-12-16 NOTE — PROGRESS NOTES
[facility] at [facility]  IP Progress Note      Chief Complaint/Reason for service:    · Ischemic Cardiomyopathy  · Stroke symptoms  · Coronary Artery Disease multiple PCI's         Patient without complaints except for very dry lips.    Past medical, surgical, social and family history reviewed in the patient's electronic medical record.           Vital Sign Min/Max for last 24 hours  Temp  Min: 97.6 °F (36.4 °C)  Max: 98.9 °F (37.2 °C)   BP  Min: 87/62  Max: 105/70   Pulse  Min: 62  Max: 70   Resp  Min: 16  Max: 18   SpO2  Min: 93 %  Max: 94 %   No Data Recorded      Intake/Output Summary (Last 24 hours) at 12/16/17 1134  Last data filed at 12/16/17 0735   Gross per 24 hour   Intake                0 ml   Output              950 ml   Net             -950 ml           Physical Exam   Constitutional: He is oriented to person, place, and time. He appears well-developed and well-nourished.   HENT:   Head: Normocephalic and atraumatic.   Eyes: No scleral icterus.   Neck: No JVD present.   Cardiovascular: Regular rhythm.    Murmur heard.   Systolic murmur is present with a grade of 2/6   Pulmonary/Chest: Effort normal.   Abdominal: Soft.   Neurological: He is alert and oriented to person, place, and time.   Skin: Skin is warm and dry.   Psychiatric: He has a normal mood and affect. His behavior is normal.       Results Review:   I reviewed the patient's recent labs in the electronic medical record.      EKG:  Paced 12/15/17    Tele:  Paced         Hospital Problem List     * (Principal)Acute on chronic systolic congestive heart failure    Overview Signed 12/16/2017 11:31 AM by Ismael Nguyen IV, MD     · Echo (12/15/17): LVEF 30%.  Severe LV dilatation.  Mild to moderate LA dilatation.  Artery to severe MR.         Coronary artery disease involving native coronary artery of native heart without angina pectoris    Overview Addendum 12/16/2017 11:28 AM by Ismael Nguyen IV, MD      · Cardiac cath for NSTEMI (11/19/2008): PCI of ramus a 3.0 x 12 mm Xience drug-eluting stent, 11/19/2008.  · Cardiac catheter (12/4/2008): PCI to mid and proximal LAD.  Sure staged PCI of RCA planned  · Cardiac catheter (12/18/2008): PCI to RCA.  Severe peripheral vascular disease of bilateral common iliac arteries.   · Cardiac catheter for recurrent CCS class II-III angina  (01/21/2011): NACHO to proximal and distal LAD.  Widely patent stents in RCA.  LVEF normal at 65%.    · Echo (2013): LVEF 25%.  Moderate MR.  Left bundle branch block. .  · Cardiac catheter for recurrent progressive chest pain syndrome (04/2013): 100% total occlusion RCA at site of previously placed stent with mild nonobstructive plaque in the left coronary artery system and previously placed stents in the LAD, ramus intermedius artery patent. subsequent CRT-D implant ([employer], model #[id number]) by Dr. Kong.  · Cardiac cath for NSTEMI  (08/20/2014): NACHO to the ramus intermedius:  Nonobstructive 50% to 60% mid to distal left anterior descending stenosis. Chronic total occlusion of the dominant right coronary artery served by left-sided collaterals with left ventricular systolic dysfunction. LVEF 10% to 15%.         Ischemic cardiomyopathy    Overview Addendum 12/16/2017  9:17 AM by DAKOTA Caba     · Echo (spring 2013): Acutely reduced LVEF 25%.  Moderate MR.  Left bundle branch block. .  · Bi-ventricular AICD implant ([employer], model #YY6112-10I) by Dr. Kong.  · LVEF 10% to 15% noted on cardiac cath 08/20/2014.  · Echo (12/15/17): Mild AI & TR. LVEF 30%.            Paroxysmal atrial fibrillation    Overview Addendum 12/16/2017 11:30 AM by Ismael Nguyen IV, MD     · Atrial fibrillation on presentation to outside hospital converted to sinus with Cardizem drip and subsequent recurrence of atrial fibrillation while in the ICU, treated with amiodarone and returned to sinus, August 2014.  · Chads Vasc=6,  on Eliquis         Hyperlipidemia LDL goal <70    Chronic kidney disease (CKD), stage III (moderate)    Essential hypertension    COPD    Cardiac resynchronization therapy defibrillator (CRT-D) in place    Leukocytosis                 · Gentle diuresis  · Neurology evaluating for facial drooping, results of CT of head unavailable  · If stroke confirmed, would increase Eliquis to therapeutic dosing  · No ACE inhibitor/ARB due to hypotension and renal insufficiency.    Ismael Nguyen IV, MD  12/16/2017

## 2017-12-16 NOTE — MBS/VFSS/FEES
Acute Care - Speech Language Pathology   Swallow Initial Evaluation [facility]   Fiberoptic Endoscopic Evaluation of Swallowing (FEES)     Patient Name: Reza Mcclelland  : 1955  MRN: [id number]  Today's Date: 2017               Admit Date: 12/15/2017    Visit Dx:     ICD-10-CM ICD-9-CM   1. Ischemic heart disease I25.9 414.9   2. Acute on chronic respiratory failure, unspecified whether with hypoxia or hypercapnia J96.20 518.84   3. Oropharyngeal dysphagia R13.12 787.22     Patient Active Problem List   Diagnosis   • Coronary artery disease involving native coronary artery of native heart without angina pectoris   • Ischemic cardiomyopathy   • Peripheral vascular disease   • Cerebrovascular accident   • Paroxysmal atrial fibrillation   • Hyperlipidemia LDL goal <70   • Chronic kidney disease (CKD), stage III (moderate)   • Left bundle branch block   • Essential hypertension   • COPD   • AICD (automatic cardioverter/defibrillator) present   • Carotid occlusion, left   • Acute on chronic systolic heart failure   • Leukocytosis     Past Medical History:   Diagnosis Date   • Acute on chronic respiratory failure     Recent acute-on-chronic respiratory failure with hospital admission for pneumonia on ventilator.   • AICD (automatic cardioverter/defibrillator) present    • Atrial fibrillation     Atrial fibrillation on presentation to outside hospital converted to sinus with Cardizem drip and subsequent recurrence of atrial fibrillation while in the ICU, treated with amiodarone and returned to sinus, 2014.   • CAD (coronary artery disease)    • Cerebrovascular accident     Cerebrovascular accident and initiation of warfarin therapy, 2013.   • Chronic kidney disease (CKD), stage III (moderate)    • Dyslipidemia    • History of hypertension    • Ischemic cardiomyopathy    • Ischemic heart disease    • Left bundle branch block     Left bundle branch block, probably combined ischemic/nonischemic  cardiomyopathy.   • Peripheral vascular disease      Past Surgical History:   Procedure Laterality Date   • AXILLARY AXILLARY BYPASS     • CAROTID STENT            SWALLOW EVALUATION (last 72 hours)      Swallow Evaluation       12/16/17 0900 12/15/17 1545             Rehab Evaluation    Document Type evaluation  -AC evaluation  -VW       Subjective Information no complaints;agree to therapy  -AC no complaints;agree to therapy  -VW       General Information    Patient Profile Review yes  -AC yes  -VW       Onset of Illness/Injury 12/15/17  -AC        Subjective Patient Observations Pt alert, cooperative. No family present.  -AC alert, cooperative  -VW       Pertinent History Of Current Problem Pt adm for ischemic cardiomyopathy. PMH includes stroke (2013). No imaging completed at this time. Pt reports he has had swallowing services at a previous hospitalization for R CVA and that he had ground food.   -AC Pt adm for ischemic cardiomyopathy. PMH includes stroke (2013). No imaging completed at this time. Pt reports he has had swallowing services at a previous hospitalization and that he had ground food.   -VW       Current Diet Limitations NPO  -AC NPO  -VW       Precautions/Limitations, Vision WFL;other (see comments)   for purposes of eval  -AC WFL;other (see comments)   for purposes of eval  -VW       Precautions/Limitations, Hearing WFL;other (see comments)   for purposes of eval  -AC WFL;other (see comments)   for purposes of eval  -VW       Prior Level of Function- Communication other (comment)   baseline unknown   -AC functional in all spheres  -VW       Prior Level of Function- Swallowing diet modifications- foods;other (comment)   ? ground foods,   -AC diet modifications- foods;other (comment)   per patient, he had ground food at previous hospitalization  -VW       Plans/Goals Discussed With patient;agreed upon  -AC patient;other;agreed upon   RN  -VW       Barriers to Rehab none identified  -AC none  identified  -       Clinical Impression    Patient's Goals For Discharge return to PO diet  -AC return to PO diet  -       SLP Swallowing Diagnosis mild dysphagia;oral dysfunction;moderate dysphagia;pharyngeal dysfunction  -AC other (see comments)   r/o pharyngeal dysphagia  -       Rehab Potential/Prognosis, Swallowing good, to achieve stated therapy goals  -        Criteria for Skilled Therapeutic Interventions Met skilled criteria for dysphagia intervention met  - demonstrates skilled criteria for intervention  -       FCM, Swallowing  2-->Level 2  -       Therapy Frequency 5 times/wk  -        SLP Diet Recommendation IV - mechanical soft, no mixed consistencies;honey-thick liquids;other (see comments)   may have cple sips thin only b/t meals & w/ chin tuck superv  -AC NPO: unsafe for food/liquid intake  -       Recommended Diagnostics  FEES  -       Recommended Feeding/Eating Techniques no straws;small sips/bites  -        SLP Rec. for Method of Medication Administration meds whole with thickened liquid;meds whole in pudding/applesauce   as tolerated  - meds whole in pudding/applesauce  -       Anticipated Discharge Disposition inpatient rehabilitation facility  -AC other (see comments)   unknown at this time  -       Pain Assessment    Pain Assessment No/denies pain  - No/denies pain  -       Cognitive Assessment/Intervention    Orientation Status oriented to;person;place  -        Follows Commands/Answers Questions able to follow single-step instructions;100% of the time  -        Oral Motor Structure and Function    Oral Motor Anatomy and Physiology  patient demonstrates anatomy and physiology that is WNL  -VW       Dentition Assessment missing teeth  -AC missing teeth  -       Secretion Management  WNL/WFL  -VW       Mucosal Quality  moist, healthy  -       Oral Musculature General Assessment  oral labial impairment  -       Oral Labial Strength and Mobility  left  labial droop  -VW       General Feeding/Swallowing Observations    Current Feeding Method  NPO  -VW       Observations of Posture During Feeding  upright at 90 degrees  -VW       Clinical Swallow Exam    Mode of Presentation  fed by clinician;self fed;cup;spoon;straw  -VW       Oral Preparation Concerns  unable to assess;other (see comments)   DNT solid  -VW       Oral Transit Concerns  unable to assess;other (see comments)   DNT solid  -VW       Oral Phase Results  unable to assess;other (see comments)   DNT solid  -VW       Pharyngeal Phase Results  sign/symptoms of pharyngeal impairment;throat clear  -VW       Summary of Clinical Exam  Trialed thins via cup/straw, NTL, and puree. Throat clear w/ every consistency trialed. Pt w/ history of stroke. Recommended NPO until further evaluation can be completed. Meds okay in pudding.   -VW       Fiberoptic Endoscopic Swallowing Exam    Risks/Benefits Reviewed risks/benefits explained;patient;agreed to eval  -AC        Nasal Entry, Topical Anesthetic right:;nostril entered using no topical anesthetic  -AC        Anatomy and Physiology    Velopharyngeal WFL  -AC        Base of Tongue symmetrical;range reduced  -AC        Epiglottis WFL  -AC        Laryngeal Function Breathing symmetrical  -AC        Laryngeal Function Phonation symmetrical  -AC        Laryngeal Function Breath Hold incomplete closure  -AC        Secretions 0- Normal, no visible secretions  -AC        Ice Chips DNA  -AC        Sensory senses scope  -AC        Oral-Phary Transit increased prep time;other (comment)   anterior loss  -AC        Anatomy Hypopharynx    Observation Anatomic Considerations anatomic deviation observed  -AC        Observations Comment Edema, erythema, and irregular tissue located at posterior 1/3 portion of TVFs (R>L). Pt reported recent mechanical ventilation, requiring intubation for several days.  -AC        FEES    Mode of Presentation thin:;nectar:;honey:;pudding:;cohesive  solid:;fed by clinician;spoon;cup;straw  -AC        Pharyngeal Phase Impairment bolus to pyriforms before initiation of response;reduced pharyngeal wall contraction;reduced laryngeal elevation;reduced anterior hyolaryngeal excursion;aspiration after from residue  -AC        Post Swallow Residue all consistencies:;post swallow residue present;residue present pyriform sinuses;residue present in valleculae;residue present on pharyngeal walls  -        Rosenbek's Scale thin:;nectar:;8-->Level 8;honey:;pudding:;cohesive solid:;1-->Level 1  -        Response to Aspiration absent response, silent aspiration;productive volitional cough following clinician cue  -AC        Attempted Compensatory Maneuvers chin down position;bolus size;bolus presentation style;multiple swallows  -        Pharyngeal Phase Results impaired pharyngeal phase of swallowing  -        FEES Summary Mild oral dysphagia c/b mild anterior loss on L w/ liquid 2' reduced labial seal & incr'd oral prep w/ solid 2' reduced lingual strength/ROM. Moderate pharyngeal dysphagia c/b penetration to the level of the TVFs w/ thin liquid during the swallow 2' delayed pharyngeal swallow initiation to pyriforms. Also, there was moderate diffuse pharyngeal residue w/ all consistencies 2' reduced base of tongue retraction/post pharyngeal wall contraction and reduced hyolaryngeal movment. Pt was u/a to clear thin/nectar-thick residue before spilled over posterior commissure into laryngeal vestibule & aspiration occurred. All aspiration was silent. No penetration/aspiration w/ small sips thin w/ chin tuck, honey-thick, pudding, solid. Suspect pt will have difficulty consistently & reliably using chin tuck so rec: dysphagia IV diet, honey-thick liquids; pt may have couple sips thin b/t meals only w/ supervision to ensure using chin tuck. Pt would benefit from dysphagia tx. Will f/u to determine if speech/language eval warranted, as well.  -        FEES Swallow  Recommendations    Eating Assistance needs occasional supervision during self eating activity  -AC        Oral Care oral care with toothbrush and dentifrice BID and PRN  -AC        Swallow Recommendations    Oral Care  oral care with toothbrush and dentifrice BID and PRN  -VW       Other Recommendations  meds whole;pudding/spoon thick  -VW       Recommended Diet  NPO: unsafe for food/liquid intake  -VW       Dysphagia Treatment Objectives and Progress    Dysphagia Treatment Objectives Improve oral skills;Improve timing of pharyngeal response;Improve laryngeal elevation;Improve hyolaryngeal excursion;Improve tongue base & pharyngeal wall squeeze;Other 1;Other 2  -AC        Improve oral skills    To Improve Oral Skills, patient will: Increase lip closure;Increase tongue tip elevation;Increase tongue lateralization;Increase tongue A-P movement;Increase back of tongue control;80%;with inconsistent cues  -AC        Status: Improve Oral Skills New  -AC        Oral Skills Progress continue to adress  -AC        Improve timing of pharyngeal response    To improve timing of pharyngeal response, patient will: Swallow in timely way using three second prep;80%;with inconsistent cues  -AC        Status: Improve timing of pharyngeal response New  -AC        Timing of Pharyngeal Response Progress continue to adress  -AC        Improve laryngeal elevation    To improve laryngeal elevation, patient will: Complete super-supraglottic swallow;80%;with inconsistent cues  -AC        Status: Improve laryngeal elevation New  -AC        Laryngeal Elevation Progress continue to adress  -AC        Improve hyolaryngeal excursion    To improve hyolaryngeal excursion, patient will: Complete chin tuck against resistance (comment number of repetitions);with inconsistent cues   30 sec, 10 reps  -AC        Status: Improve hyolaryngeal excursion New  -AC        Hyolaryngeal Excursion Progress continue to adress  -AC        Improve tongue base &  pharyngeal wall squeeze    To improve tongue base & pharyngeal wall squeeze, patient will: Complete effortful swallow;Complete tongue hold swallow;80%;with inconsistent cues  -AC        Status: Improve tongue base & pharyngeal wall squeeze New  -AC        Tongue Base/Pharyngeal Wall Squeeze Progress continue to adress  -AC        Dysphagia Other 1    Dysphagia Other 1 Objective Pt will tolerate rec'd diet w/o s/sxs aspiration w/o cues.  -AC        Status: Dysphagia Other 1 New  -AC        Dysphagia Other 1 Progress continue to address  -AC        Dysphagia Other 2    Dysphagia Other 2 Objective Pt will tolerate thin liquids w/ chin tuck w/o s/sxs aspiration w/o cues.  -AC        Status: Dysphagia Other 2 New  -AC        Dysphagia Other 2 Progress continue to address  -AC          User Key  (r) = Recorded By, (t) = Taken By, (c) = Cosigned By    Initials Name Effective Dates    AC Cherelle Busby MS CCC-SLP 07/27/17 -     VW Randa Morton MA, CFY-SLP 07/13/17 -         EDUCATION  The patient has been educated in the following areas:   Dysphagia (Swallowing Impairment) Oral Care/Hydration Modified Diet Instruction.    SLP Recommendation and Plan  SLP Swallowing Diagnosis: mild dysphagia, oral dysfunction, moderate dysphagia, pharyngeal dysfunction  SLP Diet Recommendation: IV - mechanical soft, no mixed consistencies, honey-thick liquids, other (see comments) (may have cple sips thin only b/t meals & w/ chin tuck superv)  Recommended Feeding/Eating Techniques: no straws, small sips/bites  SLP Rec. for Method of Medication Administration: meds whole with thickened liquid, meds whole in pudding/applesauce (as tolerated)  Criteria for Skilled Therapeutic Interventions Met: skilled criteria for dysphagia intervention met  Anticipated Discharge Disposition: inpatient rehabilitation facility  Rehab Potential/Prognosis, Swallowing: good, to achieve stated therapy goals  Therapy Frequency: 5 times/wk    Plan of Care  Review  Plan Of Care Reviewed With: patient  Progress:  (eval)  Outcome Summary/Follow up Plan: FEES completed. Pt presented w/ mild oral dysphagia c/b mild anterior loss on L w/ liquid 2' reduced labial seal & incr'd oral prep w/ solid 2' reduced lingual strength/ROM. Moderate pharyngeal dysphagia c/b penetration to the level of the TVFs w/ thin liquid during the swallow 2' delayed pharyngeal swallow initiation to pyriforms. Also, there was moderate diffuse pharyngeal residue w/ all consistencies 2' reduced base of tongue retraction/post pharyngeal wall contraction and reduced hyolaryngeal movment. Pt was u/a to clear thin/nectar-thick residue before spilled over posterior commissure into laryngeal vestibule & aspiration occurred. All aspiration was silent. No penetration/aspiration w/ small sips thin w/ chin tuck, honey-thick, pudding, solid. Suspect pt will have difficulty consistently & reliably using chin tuck so rec: dysphagia IV diet, honey-thick liquids; pt may have couple sips thin b/t meals only w/ supervision to ensure using chin tuck. Pt would benefit from dysphagia tx. Will f/u to determine if speech/language eval warranted, as well.          IP SLP Goals       12/16/17 1123          Safely Consume Diet    Safely Consume Diet- SLP, Date Established 12/16/17  -      Safely Consume Diet- SLP, Time to Achieve by discharge  -        User Key  (r) = Recorded By, (t) = Taken By, (c) = Cosigned By    Initials Name Provider Type    ZURI Busby MS CCC-SLP Speech and Language Pathologist           Time Calculation:         Time Calculation- SLP       12/16/17 1124          Time Calculation- SLP    SLP Start Time 0900  -      SLP Received On 12/16/17  -        User Key  (r) = Recorded By, (t) = Taken By, (c) = Cosigned By    Initials Name Provider Type    ZURI Busby MS CCC-SLP Speech and Language Pathologist          Therapy Charges for Today     Code Description Service Date Service Provider  Modifiers Qty    [id number] HC ST FIBEROPTIC ENDO EVAL SWALL 8 12/16/2017 Cherelle Busby, MS CCC-SLP GN 1          SLP G-Codes  Functional Limitations: Swallowing  Swallow Current Status (): 100 percent impaired, limited or restricted  Swallow Goal Status (): At least 1 percent but less than 20 percent impaired, limited or restricted    Cherelle Busby MS CCC-SLP  12/16/2017

## 2017-12-16 NOTE — PLAN OF CARE
Problem: Patient Care Overview (Adult)  Goal: Plan of Care Review  Outcome: Ongoing (interventions implemented as appropriate)  Goal: Discharge Needs Assessment  Outcome: Ongoing (interventions implemented as appropriate)    Problem: Fall Risk (Adult)  Goal: Identify Related Risk Factors and Signs and Symptoms  Outcome: Ongoing (interventions implemented as appropriate)  Goal: Absence of Falls  Outcome: Ongoing (interventions implemented as appropriate)    Problem: Cardiac: Heart Failure (Adult)  Goal: Signs and Symptoms of Listed Potential Problems Will be Absent or Manageable (Cardiac: Heart Failure)  Outcome: Ongoing (interventions implemented as appropriate)

## 2017-12-17 LAB
GLUCOSE BLDC GLUCOMTR-MCNC: 125 MG/DL (ref 70–130)
GLUCOSE BLDC GLUCOMTR-MCNC: 210 MG/DL (ref 70–130)

## 2017-12-17 PROCEDURE — 82962 GLUCOSE BLOOD TEST: CPT

## 2017-12-17 PROCEDURE — 99225 PR SBSQ OBSERVATION CARE/DAY 25 MINUTES: CPT | Performed by: INTERNAL MEDICINE

## 2017-12-17 PROCEDURE — 99225 PR SBSQ OBSERVATION CARE/DAY 25 MINUTES: CPT | Performed by: NURSE PRACTITIONER

## 2017-12-17 PROCEDURE — G0378 HOSPITAL OBSERVATION PER HR: HCPCS

## 2017-12-17 PROCEDURE — 94760 N-INVAS EAR/PLS OXIMETRY 1: CPT

## 2017-12-17 PROCEDURE — 94799 UNLISTED PULMONARY SVC/PX: CPT

## 2017-12-17 PROCEDURE — 63710000001 PREDNISONE PER 5 MG: Performed by: NURSE PRACTITIONER

## 2017-12-17 PROCEDURE — 96376 TX/PRO/DX INJ SAME DRUG ADON: CPT

## 2017-12-17 PROCEDURE — 25010000002 FUROSEMIDE PER 20 MG: Performed by: NURSE PRACTITIONER

## 2017-12-17 PROCEDURE — 94640 AIRWAY INHALATION TREATMENT: CPT

## 2017-12-17 RX ORDER — BUDESONIDE AND FORMOTEROL FUMARATE DIHYDRATE 160; 4.5 UG/1; UG/1
2 AEROSOL RESPIRATORY (INHALATION)
Status: DISCONTINUED | OUTPATIENT
Start: 2017-12-17 | End: 2017-12-18 | Stop reason: HOSPADM

## 2017-12-17 RX ADMIN — BUDESONIDE AND FORMOTEROL FUMARATE DIHYDRATE 2 PUFF: 160; 4.5 AEROSOL RESPIRATORY (INHALATION) at 20:32

## 2017-12-17 RX ADMIN — NICOTINE 1 PATCH: 21 PATCH, EXTENDED RELEASE TRANSDERMAL at 17:17

## 2017-12-17 RX ADMIN — CARVEDILOL 6.25 MG: 6.25 TABLET, FILM COATED ORAL at 17:17

## 2017-12-17 RX ADMIN — TICAGRELOR 90 MG: 90 TABLET ORAL at 08:24

## 2017-12-17 RX ADMIN — IPRATROPIUM BROMIDE AND ALBUTEROL SULFATE 3 ML: .5; 3 SOLUTION RESPIRATORY (INHALATION) at 08:45

## 2017-12-17 RX ADMIN — AMIODARONE HYDROCHLORIDE 200 MG: 200 TABLET ORAL at 08:25

## 2017-12-17 RX ADMIN — FUROSEMIDE 20 MG: 10 INJECTION, SOLUTION INTRAMUSCULAR; INTRAVENOUS at 17:17

## 2017-12-17 RX ADMIN — PANTOPRAZOLE SODIUM 40 MG: 40 TABLET, DELAYED RELEASE ORAL at 05:25

## 2017-12-17 RX ADMIN — APIXABAN 2.5 MG: 2.5 TABLET, FILM COATED ORAL at 08:25

## 2017-12-17 RX ADMIN — APIXABAN 2.5 MG: 2.5 TABLET, FILM COATED ORAL at 20:25

## 2017-12-17 RX ADMIN — IPRATROPIUM BROMIDE AND ALBUTEROL SULFATE 3 ML: .5; 3 SOLUTION RESPIRATORY (INHALATION) at 13:39

## 2017-12-17 RX ADMIN — SPIRONOLACTONE 25 MG: 25 TABLET, FILM COATED ORAL at 08:25

## 2017-12-17 RX ADMIN — IPRATROPIUM BROMIDE AND ALBUTEROL SULFATE 3 ML: .5; 3 SOLUTION RESPIRATORY (INHALATION) at 20:32

## 2017-12-17 RX ADMIN — TICAGRELOR 90 MG: 90 TABLET ORAL at 20:25

## 2017-12-17 RX ADMIN — FUROSEMIDE 20 MG: 10 INJECTION, SOLUTION INTRAMUSCULAR; INTRAVENOUS at 05:24

## 2017-12-17 RX ADMIN — ATORVASTATIN CALCIUM 80 MG: 40 TABLET, FILM COATED ORAL at 20:25

## 2017-12-17 RX ADMIN — PREDNISONE 30 MG: 10 TABLET ORAL at 08:25

## 2017-12-17 NOTE — PROGRESS NOTES
[facility] Medicine Services  INPATIENT PROGRESS NOTE    Date of Admission: 12/15/2017  Length of Stay: 0  Primary Care Physician: EMILY Romo  Subjective   Chief Complaint: shortness of breath    HPI:  Patient sitting up in chair talking wife, sister and brother-in-law in NAD.  Explained to patient that he was felt that his difficulty breathing was more likely attributed to COPD than a cardiac issue.  He'll stay one more night to talk to Dr. Guevara in the morning.    Review Of Systems:   Review of Systems  Patient denies headaches, fever, chills, shortness of breath, chest pain, cough, nausea or vomiting, diarrhea, rash, itching or bleeding  Objective    Temp:  [97.1 °F (36.2 °C)-97.9 °F (36.6 °C)] 97.7 °F (36.5 °C)  Heart Rate:  [69-75] 69  Resp:  [16-18] 18  BP: ()/(65-81) 91/71  Physical Exam   General: Alert, well-developed well-nourished male in no acute distress    Head: Normocephalic atraumatic    Eyes: PERRLA, EOMI, nonicteric, conjunctiva normal    ENT: Pink, moist mucous membranes    Neck: Supple, nontender, trachea midline without lymphadenopathy, JVD, nuchal rigidity.      Cardiovascular: RRR-dual paced  no R/G  +Murmur    Respiratory: Nonlabored, symmetrical chest expansion, clear to auscultation bilaterally    Abdomen: Obese, Soft, nontender, nondistended,  positive bowel sounds in all 4 quadrants     Extremities: FROM in upper and lower extremities bilaterally negative for edema/cyanosis/clubbing.  Negative calf pain    Skin: Pink/warm/dry.  No rash or lesions noted    Neuro: Alert and oriented to person place time and situation, speech is slightly slurred but understandable, follows all commands, recent and remote memory intact, left facial droop    Psych: Patient is pleasant and cooperative.  Normal affect.  Negative suicidal ideation or homicidal ideation.    Results Review:    I have reviewed the labs, radiology results and diagnostic studies.    Results  from last 7 days  Lab Units 12/16/17  0831 12/15/17  1251   WBC 10*3/mm3 16.82* 18.08*   HEMOGLOBIN g/dL 17.5 16.7   HEMATOCRIT % 51.3* 49.3   PLATELETS 10*3/mm3 215 231   INR   --  1.14       Results from last 7 days  Lab Units 12/16/17  0831 12/15/17  1251   SODIUM mmol/L 137 137   POTASSIUM mmol/L 3.5 4.1   CHLORIDE mmol/L 98* 98*   CO2 mmol/L 30.0 27.0   BUN mg/dL 32* 30*   CREATININE mg/dL 1.50* 1.40*   GLUCOSE mg/dL 102* 128*   CALCIUM mg/dL 9.3 9.2   ALT (SGPT) U/L  --  23   AST (SGOT) U/L  --  17     BNP   Date Value Ref Range Status   12/15/2017 637.0 (H) 0.0 - 100.0 pg/mL Final     Microbiology Results Abnormal     None        Imaging Results (last 24 hours)     Procedure Component Value Units Date/Time    CT Head Without Contrast [[id number]] Collected:  12/16/17 1114     Updated:  12/16/17 1335    Narrative:       EXAMINATION: CT HEAD WO CONTRAST - 12/15/2017     INDICATION:  I25.9-Chronic ischemic heart disease, unspecified;  J96.20-Acute and chronic respiratory failure, unspecified whether with  hypoxia or hypercapnia.     TECHNIQUE: CT scan of the head was 45 mm intervals. No intravenous  contrast was utilized.     The radiation dose reduction device was turned on for each scan per the  ALARA (As Low as Reasonably Achievable) protocol.     COMPARISON: None.     FINDINGS: There is low density in the right basal ganglia and right  parietal region which does not have the appearance of an acute process.  There is no hemorrhage. There is no significant mass effect or edema.  There is no extra-axial fluid collection. There is no intracranial mass.       Impression:       Old infarcts in the right basal ganglia and parietal region.  There are no acute findings. There is no prior exam for comparison.     DICTATED:     12/16/2017  EDITED:          12/16/2017        This report was finalized on 12/16/2017 1:33 PM by Dr. Sam Ellis MD.           Results for orders placed during the hospital encounter of  12/15/17   Adult Transthoracic Echo Complete W/ Cont if Necessary Per Protocol    Narrative · The left ventricular cavity is severely dilated.  · Left atrial cavity size is mild-to-moderately dilated.  · Mild aortic valve regurgitation is present.  · Moderate-to-severe mitral valve regurgitation is present  · Left ventricular systolic function is moderately decreased. Estimated EF   = 30%.  · Left ventricular wall thickness is consistent with mild concentric   hypertrophy.  · Left ventricular diastolic dysfunction (grade I a) consistent with   impaired relaxation.  · Mild tricuspid valve regurgitation is present.  · There is no evidence of pericardial effusion.  · No evidence of pulmonary hypertension is present.  · The aortic valve exhibits sclerosis.  · No IV Lumason or IV saline contrast studies performed; cannot exclude   intracardiac source of embolus.        Results for orders placed during the hospital encounter of 12/15/17   Duplex Carotid Ultrasound CAR    Narrative · Left internal carotid artery stenosis totally occluded.  · Right internal carotid artery stenosis of 0-49%.  · Proximal right internal carotid artery plaque without significant   stenosis.  · Marked reduction in right carotid artery flow velocities suggestive of   overall reduced cardiac output.  · Incidental bidirectional vertebral artery flow with reduced velocity.        I have reviewed the medications.    Assessment/Plan   Assessment/Problem List  Hospital Problem List     * (Principal)Acute on chronic systolic congestive heart failure    Overview Signed 12/16/2017 11:31 AM by Ismael Nguyen IV, MD     · Echo (12/15/17): LVEF 30%.  Severe LV dilatation.  Mild to moderate LA dilatation.  Artery to severe MR.         Coronary artery disease involving native coronary artery of native heart without angina pectoris    Overview Addendum 12/17/2017 10:18 AM by Ismael Nguyen IV, MD     · Cardiac catheterization for NSTEMI  (11/19/2008): PCI of ramus a 3.0 x 12 mm Xience drug-eluting stent, 11/19/2008.  · Cardiac catheterization (12/4/2008): PCI to mid and proximal LAD.  Sure staged PCI of RCA planned  · Cardiac catheterization (12/18/2008): PCI to RCA.    · Cardiac catheterization for recurrent CCS class II-III angina  (01/21/2011): NACHO to proximal and distal LAD.  Widely patent stents in RCA.  LVEF normal at 65%.    · Echo (2013): LVEF 25%.  Moderate MR.  Left bundle branch block. .  · Cardiac catheterization for recurrent angina (4/2013): Stable CAD with RCA occlusion and patent stents in the left coronary system.  · Cardiac cath for NSTEMI  (08/20/2014): NACHO to the ramus intermedius:  Nonobstructive 50% to 60% mid to distal left anterior descending stenosis. Chronic total occlusion of the dominant right coronary artery served by left-sided collaterals with left ventricular systolic dysfunction. LVEF 15%.         Ischemic cardiomyopathy    Overview Addendum 12/17/2017 10:16 AM by Ismael Nguyen IV, MD     · Echo (spring 2013): Acutely reduced LVEF 25%.  Moderate MR.  Left bundle branch block. .  · CRT-D implant ([employer], model #[id number]) by Dr. Kong.  · LVEF 10% to 15% noted on cardiac cath 08/20/2014.  · Echo (12/15/17): Mild AI & TR. LVEF 30%.          Paroxysmal atrial fibrillation    Overview Addendum 12/16/2017 11:30 AM by Ismael Nguyen IV, MD     · Atrial fibrillation on presentation to outside hospital converted to sinus with Cardizem drip and subsequent recurrence of atrial fibrillation while in the ICU, treated with amiodarone and returned to sinus, August 2014.  · Chads Vasc=6, on Eliquis         Hyperlipidemia LDL goal <70    Chronic kidney disease (CKD), stage III (moderate)    Essential hypertension    COPD    Cardiac resynchronization therapy defibrillator (CRT-D) in place    Leukocytosis    Silent aspiration      Assessment:  63 yo with HO CAD with recent LHC and stents Was admitted  from Dr. Guevara's office on 12/15/17 for further evaluation and treatment of shortness of air.    Plan  --Now with several episodes of acute dyspnea-- possible related to untreated COPD and or intermittent silent aspirations or both.  --FEES was completed and showed mild oral dysphagia c/b mild anterior loss on L w/ liquid 2' reduced labial seal & incr'd oral prep w/ solid 2' reduced lingual strength/ROM.   --Will cont nebs, prednisone taper, Symbicort at DC   --Diet Dysphagia; IV - Mechanical Soft No Mixed Consistencies; Honey Thick; No Straws; Cardiac;  Make sure that patient gets this diet on dc    DVT prophylaxis:[facility] is fine with DC home;  Will sign off at this time; Please re-consult as needed.    Discharge Planning: I expect patient to be discharged per Cardiology    More than 50% of time spent counseling on current illness and plan of care. Case discussed with: Patient, wife, sister and brother-in-law  Total time spent face to face with the patient was 35 minutes.  Total time of the encounter was 50 minutes.  Discussed patient's echocardiogram, carotid Doppler results.  Patient had a lot of questions, but I referred him back to Dr. Guevara, since patient will be having his caretaker will by him in the morning.      Bonnie Sharif, DAKOTA 12/17/17 12:05 PM

## 2017-12-17 NOTE — PROGRESS NOTES
[facility] at [facility]  IP Progress Note      Chief Complaint/Reason for service:    · Ischemic Cardiomyopathy  · Stroke symptoms  · Coronary Artery Disease multiple PCI's         No complaints.  Patient has not walked in the hallway.  CT scan of the head yesterday showed no acute pathology.    Past medical, surgical, social and family history reviewed in the patient's electronic medical record.           Vital Sign Min/Max for last 24 hours  Temp  Min: 97.1 °F (36.2 °C)  Max: 97.9 °F (36.6 °C)   BP  Min: 89/66  Max: 115/81   Pulse  Min: 69  Max: 75   Resp  Min: 16  Max: 18   SpO2  Min: 92 %  Max: 100 %   No Data Recorded      Intake/Output Summary (Last 24 hours) at 12/17/17 1018  Last data filed at 12/17/17 0924   Gross per 24 hour   Intake              360 ml   Output              500 ml   Net             -140 ml           Physical Exam   Constitutional: He is oriented to person, place, and time. He appears well-developed and well-nourished.   HENT:   Head: Atraumatic.   Cardiovascular: Normal rate and regular rhythm.    No murmur heard.  Pulmonary/Chest: Effort normal.   Abdominal: Soft.   Neurological: He is alert and oriented to person, place, and time.   Left facial droop   Skin: Skin is warm.   Psychiatric: He has a normal mood and affect. His behavior is normal.       Results Review:   I reviewed the patient's recent labs in the electronic medical record.      EKG:  Paced 12/15/17    Tele:  Paced         Hospital Problem List     * (Principal)Acute on chronic systolic congestive heart failure    Overview Signed 12/16/2017 11:31 AM by Ismael Nguyen IV, MD     · Echo (12/15/17): LVEF 30%.  Severe LV dilatation.  Mild to moderate LA dilatation.  Artery to severe MR.         Coronary artery disease involving native coronary artery of native heart without angina pectoris    Overview Addendum 12/17/2017 10:18 AM by Ismael Nguyen IV, MD     · Cardiac catheterization  for NSTEMI (11/19/2008): PCI of ramus a 3.0 x 12 mm Xience drug-eluting stent, 11/19/2008.  · Cardiac catheterization (12/4/2008): PCI to mid and proximal LAD.  Sure staged PCI of RCA planned  · Cardiac catheterization (12/18/2008): PCI to RCA.    · Cardiac catheterization for recurrent CCS class II-III angina  (01/21/2011): NACHO to proximal and distal LAD.  Widely patent stents in RCA.  LVEF normal at 65%.    · Echo (2013): LVEF 25%.  Moderate MR.  Left bundle branch block. .  · Cardiac catheterization for recurrent angina (4/2013): Stable CAD with RCA occlusion and patent stents in the left coronary system.  · Cardiac cath for NSTEMI  (08/20/2014): NACHO to the ramus intermedius:  Nonobstructive 50% to 60% mid to distal left anterior descending stenosis. Chronic total occlusion of the dominant right coronary artery served by left-sided collaterals with left ventricular systolic dysfunction. LVEF 15%.         Ischemic cardiomyopathy    Overview Addendum 12/17/2017 10:16 AM by Ismael Nguyen IV, MD     · Echo (spring 2013): Acutely reduced LVEF 25%.  Moderate MR.  Left bundle branch block. .  · CRT-D implant ([employer], model #[id number]) by Dr. Kong.  · LVEF 10% to 15% noted on cardiac cath 08/20/2014.  · Echo (12/15/17): Mild AI & TR. LVEF 30%.          Paroxysmal atrial fibrillation    Overview Addendum 12/16/2017 11:30 AM by Ismael Nguyen IV, MD     · Atrial fibrillation on presentation to outside hospital converted to sinus with Cardizem drip and subsequent recurrence of atrial fibrillation while in the ICU, treated with amiodarone and returned to sinus, August 2014.  · Chads Vasc=6, on Eliquis         Hyperlipidemia LDL goal <70    Chronic kidney disease (CKD), stage III (moderate)    Essential hypertension    COPD    Cardiac resynchronization therapy defibrillator (CRT-D) in place    Leukocytosis    Silent aspiration               · Gentle diuresis  · Neurology evaluating for  facial drooping  · No ACE inhibitor/ARB due to hypotension and renal insufficiency.  · Dr Guevara to assume care in [facility] Gonzalo Nguyen IV, MD  12/17/2017

## 2017-12-18 VITALS
DIASTOLIC BLOOD PRESSURE: 64 MMHG | HEART RATE: 69 BPM | TEMPERATURE: 96.5 F | SYSTOLIC BLOOD PRESSURE: 119 MMHG | BODY MASS INDEX: 29.49 KG/M2 | RESPIRATION RATE: 18 BRPM | OXYGEN SATURATION: 95 % | WEIGHT: 194.6 LBS | HEIGHT: 68 IN

## 2017-12-18 PROCEDURE — 94799 UNLISTED PULMONARY SVC/PX: CPT

## 2017-12-18 PROCEDURE — 99217 PR OBSERVATION CARE DISCHARGE MANAGEMENT: CPT | Performed by: INTERNAL MEDICINE

## 2017-12-18 PROCEDURE — G0378 HOSPITAL OBSERVATION PER HR: HCPCS

## 2017-12-18 PROCEDURE — 96376 TX/PRO/DX INJ SAME DRUG ADON: CPT

## 2017-12-18 PROCEDURE — 94640 AIRWAY INHALATION TREATMENT: CPT

## 2017-12-18 PROCEDURE — 25010000002 FUROSEMIDE PER 20 MG: Performed by: NURSE PRACTITIONER

## 2017-12-18 PROCEDURE — 63710000001 PREDNISONE PER 5 MG: Performed by: NURSE PRACTITIONER

## 2017-12-18 RX ORDER — NITROGLYCERIN 0.4 MG/1
TABLET SUBLINGUAL
Qty: 100 TABLET | Refills: 11 | Status: SHIPPED | OUTPATIENT
Start: 2017-12-18 | End: 2022-02-23 | Stop reason: SDUPTHER

## 2017-12-18 RX ORDER — TORSEMIDE 10 MG/1
10 TABLET ORAL DAILY
Qty: 30 TABLET | Refills: 5 | Status: SHIPPED | OUTPATIENT
Start: 2017-12-18 | End: 2018-05-02 | Stop reason: SDUPTHER

## 2017-12-18 RX ORDER — VARENICLINE TARTRATE 0.5 MG/1
0.5 TABLET, FILM COATED ORAL 2 TIMES DAILY
Qty: 60 TABLET | Refills: 5 | Status: SHIPPED | OUTPATIENT
Start: 2017-12-18 | End: 2018-01-31 | Stop reason: HOSPADM

## 2017-12-18 RX ORDER — BUDESONIDE AND FORMOTEROL FUMARATE DIHYDRATE 160; 4.5 UG/1; UG/1
2 AEROSOL RESPIRATORY (INHALATION)
Qty: 1 INHALER | Refills: 12 | Status: SHIPPED | OUTPATIENT
Start: 2017-12-18 | End: 2020-01-27

## 2017-12-18 RX ADMIN — APIXABAN 2.5 MG: 2.5 TABLET, FILM COATED ORAL at 08:45

## 2017-12-18 RX ADMIN — FUROSEMIDE 20 MG: 10 INJECTION, SOLUTION INTRAMUSCULAR; INTRAVENOUS at 05:49

## 2017-12-18 RX ADMIN — IPRATROPIUM BROMIDE AND ALBUTEROL SULFATE 3 ML: .5; 3 SOLUTION RESPIRATORY (INHALATION) at 08:03

## 2017-12-18 RX ADMIN — TICAGRELOR 90 MG: 90 TABLET ORAL at 08:45

## 2017-12-18 RX ADMIN — PREDNISONE 30 MG: 10 TABLET ORAL at 08:45

## 2017-12-18 RX ADMIN — AMIODARONE HYDROCHLORIDE 200 MG: 200 TABLET ORAL at 08:45

## 2017-12-18 RX ADMIN — BUDESONIDE AND FORMOTEROL FUMARATE DIHYDRATE 2 PUFF: 160; 4.5 AEROSOL RESPIRATORY (INHALATION) at 08:04

## 2017-12-18 RX ADMIN — PANTOPRAZOLE SODIUM 40 MG: 40 TABLET, DELAYED RELEASE ORAL at 05:49

## 2017-12-18 RX ADMIN — SPIRONOLACTONE 25 MG: 25 TABLET, FILM COATED ORAL at 08:45

## 2017-12-18 RX ADMIN — CARVEDILOL 6.25 MG: 6.25 TABLET, FILM COATED ORAL at 08:45

## 2017-12-18 NOTE — DISCHARGE SUMMARY
Date of Discharge:  12/18/2017    Patient Care Team:  EMILY Romo as PCP - General (Family Medicine)  Andrea Guevara MD as Cardiologist (Cardiology)    Discharge Diagnosis: ICM, CHF;improved    Presenting Problem  Ischemic heart disease [I25.9]    No Known Allergies    Discharge Medications   Stas Reza VARGAS   Home Medication Instructions DONTE:[id number]    Printed on:12/18/17 1041   Medication Information                      albuterol (PROVENTIL) (5 MG/ML) 0.5% nebulizer solution  Take 0.5 mL by nebulization Every 6 (Six) Hours As Needed for Wheezing.             amiodarone (PACERONE) 200 MG tablet  Take 1 tablet by mouth Daily.             apixaban (ELIQUIS) 2.5 MG tablet tablet  Take 2.5 mg by mouth 2 (Two) Times a Day.             atorvastatin (LIPITOR) 80 MG tablet  Take 80 mg by mouth Every Night.             budesonide-formoterol (SYMBICORT) 160-4.5 MCG/ACT inhaler  Inhale 2 puffs 2 (Two) Times a Day.             carvedilol (COREG) 6.25 MG tablet  Take 1 tablet by mouth 2 (Two) Times a Day With Meals.             cefuroxime (CEFTIN) 500 MG tablet  Take 500 mg by mouth 2 (Two) Times a Day.             doxycycline (VIBRAMYICN) 100 MG tablet  Take 100 mg by mouth 2 (Two) Times a Day.             PH-[id number]-[id number] 3-Phytoster (ANIMI-3/VITAMIN D PO)  Take  by mouth Daily.             nitroglycerin (NITROSTAT) 0.4 MG SL tablet  1 under the tongue as needed for angina, may repeat q5mins for up three doses             potassium chloride (K-DUR,KLOR-CON) 20 MEQ CR tablet  take 1 tablet by mouth once daily             predniSONE (DELTASONE) 10 MG tablet  Take 10 mg by mouth Daily. 3 tablets once daily with breakfast             spironolactone (ALDACTONE) 25 MG tablet  take 1 tablet by mouth once daily             ticagrelor (BRILINTA) 90 MG tablet tablet  Take 90 mg by mouth 2 (Two) Times a Day.             torsemide (DEMADEX) 10 MG tablet  Take 1 tablet by mouth Daily.             varenicline (CHANTIX)  0.5 MG tablet  Take 1 tablet by mouth 2 (Two) Times a Day.             VENTOLIN  (90 BASE) MCG/ACT inhaler  As Needed.                 Procedures Performed:    · Echocardiogram 12/15/17:  · The left ventricular cavity is severely dilated.  · Left atrial cavity size is mild-to-moderately dilated.  · Mild aortic valve regurgitation is present.  · Moderate-to-severe mitral valve regurgitation is present  · Left ventricular systolic function is moderately decreased. Estimated EF = 30%.  · Left ventricular wall thickness is consistent with mild concentric hypertrophy.  · Left ventricular diastolic dysfunction (grade I a) consistent with impaired relaxation.  · Mild tricuspid valve regurgitation is present.  · There is no evidence of pericardial effusion.  · No evidence of pulmonary hypertension is present.  · The aortic valve exhibits sclerosis.  · No IV Lumason or IV saline contrast studies performed; cannot exclude intracardiac source of embolus.      ·  Carotid duplex 12/15/17:  · Left internal carotid artery stenosis totally occluded.  · Right internal carotid artery stenosis of 0-49%.  · Proximal right internal carotid artery plaque without significant stenosis.  · Marked reduction in right carotid artery flow velocities suggestive of overall reduced cardiac output.  · Incidental bidirectional vertebral artery flow with reduced velocity    · Chest x ray 12/15/17:Cardiomegaly is noted. There is a multilead ICD pacemaker  defibrillator in place from the left with a coronary sinus lead. Although there is a slight increased interstitial fibrotic pattern,there is no edema, no free fluid and no active disease.          · CT head 12/16/17:Old infarcts in the right basal ganglia and parietal region.There are no acute findings. There is no prior exam for comparison    · ECG x1       History of Present Illness  Patient returned for scheduled 5-month follow-up on 12/15/17 in [facility] office. Per office notes, he says that  "he retired at the end of 2017/beginning of 2017.  He did very well and was asymptomatic, and then, they went on vacation to [place], [place] and hiked throughout the [place] without difficulty or complaint, and then after taking a hot shower, abruptly developed air hunger and \"I couldn't breathe.\"  His significant other, who accompanies him, says that he was airlifted to the hospital in [place].  They were told he had had a heart attack, and he was in the hospital from approximately 2017 to 2017.  He was told that they put 3 stents in and \"opened 2 back up.\"  Apparently, he left AMA, but we have no records to review.  Then, they drove back to [place], and he was home for about a week, and his brother  in [facility], and he went to the visitation and was with another brother and developed acute shortness of breath, so he went to [facility] ED, and he was told that he had had a stroke.  He was in the hospital from , 2017, until Wednesday, 2017. The patient states that he feels \"pretty good\" since getting out of the hospital on 2017; he just has difficulty with speech and drooling from the side of his mouth; he also notes marked tachypnea, dyspnea, fatigue, and nonproductive cough and feels short of breath even at rest and is unable to tolerate even minimal activity.  We have not received any records from [facility] or [facility]; his girlfriend says that she signed a release to have them sent, but they have not been received.  He currently denies fever, chills, abdominal pain, nausea, emesis, or headache but has had continued slurred speech and facial weakness with drooling.    Cardiovascular Disease Risk Factors  hyptertension, hyperlipidemia, diabetes mellitus, tobacco abuse, obesity, increased age, male gender    Hospital Course  Patient is a 62 y.o. male presented to [facility] 12/15/17 from [facility] office with " dyspnea, marginal renal function, status post CVA 12/9/17 and was hospitalized at [facility] until 12/13/17 .  Associated symptoms were tachypnea, fatigue, nonproductive cough, drooling, dysphonia, and  facial weakness.  He was admitted to [facility] for decompensated CHF/residual effects from CVA and significant decline in health since evaluation 5 months ago.  and echocardiogram demonstrated EF 30% and moderate to severe MR. Hospitalists were consulted for CVA follow up, but no MRI performed due to ICD implant. CT head negative for acute infarct. Patient was placed on dysphagia diet which he will continue after discharge. He was gently diuresed with improvement of symptoms. He is anticoagulated with Eliquis. Patient and his significant other declined consideration of inpatient PT as well as OP home health care monitoring. We encouraged tobacco cessation and provided the patient with Chantix at discharge. He was discharged in stable condition home with the follow up appointments listed below.     Labs    Results from last 7 days  Lab Units 12/16/17  0831   SODIUM mmol/L 137   POTASSIUM mmol/L 3.5   CHLORIDE mmol/L 98*   CO2 mmol/L 30.0   BUN mg/dL 32*   CREATININE mg/dL 1.50*   GLUCOSE mg/dL 102*   CALCIUM mg/dL 9.3       Results from last 7 days  Lab Units 12/16/17  0831   WBC 10*3/mm3 16.82*   HEMOGLOBIN g/dL 17.5   HEMATOCRIT % 51.3*   PLATELETS 10*3/mm3 215       Results from last 7 days  Lab Units 12/16/17  0831   CHOLESTEROL mg/dL 129   TRIGLYCERIDES mg/dL 163*   HDL CHOL mg/dL 39*       Results from last 7 days  Lab Units 12/16/17  0831   HEMOGLOBIN A1C % 7.20*            Vital Signs  Temp:  [96.5 °F (35.8 °C)-97.6 °F (36.4 °C)] 96.5 °F (35.8 °C)  Heart Rate:  [69-85] 69  Resp:  [16-18] 18  BP: (101-119)/(63-85) 119/64  Temp  Min: 96.5 °F (35.8 °C)  Max: 97.6 °F (36.4 °C)   BP  Min: 101/75  Max: 119/64   Pulse  Min: 69  Max: 85   Resp  Min: 16  Max: 18   SpO2  Min: 91 %  Max: 95 %   Flow  (L/min)  Min: 2  Max: 2   Weight  Min: 88.3 kg (194 lb 9.6 oz)  Max: 88.3 kg (194 lb 9.6 oz)     Discharge Disposition  Home or Self Care    Discharge Diet:  diet dysphagia; needs mechanical soft, no mixed consistencies; honey thick, no straws, cardiac    Activity at Discharge:  as tolerated    Follow-up Appointments  Future Appointments  Date Time Provider Department Center   1/10/2018 10:30 AM [facility] ECHO/VASC CART RM1 [facility]     Additional Instructions for the Follow-ups that You Need to Schedule     Ambulatory Referral to Nutrition Services    As directed    Needs mechanical soft, no mixed consistencies;honey thick, no straws, cardiac   Order Comments:  Needs mechanical soft, no mixed consistencies;honey thick, no straws, cardiac            Discharge Follow-up with Specialty: CHF clinic; 2 Weeks    As directed    Specialty:  CHF clinic    Follow Up:  2 Weeks    Follow Up Details:  BMP and if serum creatinine acceptable, would start Entresto 24/26mg bid           Discharge Follow-up with Specified Provider: Dr. Guevara; 1 Month    As directed    To:  Dr. Guevara    Follow Up:  1 Month                     Test Results Pending at Discharge: None           Scribed for Andrea Guevara MD by DAKOTA Stern. 12/18/2017  10:48 AM    I, Andrea Guevara MD, [facility], personally performed the services described in this documentation as scribed by the above named individual in my presence, and it is both accurate and complete.

## 2017-12-18 NOTE — DISCHARGE INSTR - APPOINTMENTS
Dr. Caceres office will contact patient for follow up appointment either by mail or phone. Thank you! If you have any questions please call [phone].

## 2017-12-18 NOTE — PLAN OF CARE
Problem: Patient Care Overview (Adult)  Goal: Plan of Care Review  Outcome: Ongoing (interventions implemented as appropriate)    12/17/17 2000 12/18/17 0509   Coping/Psychosocial Response Interventions   Plan Of Care Reviewed With patient --    Patient Care Overview   Progress --  improving   Outcome Evaluation   Outcome Summary/Follow up Plan --  Pt. states no chest pain, Pt. vitals stable, 2L NC at night, pt to be d/c tommorow, will continue to monitor.        Goal: Adult Individualization and Mutuality  Outcome: Ongoing (interventions implemented as appropriate)  Goal: Discharge Needs Assessment  Outcome: Ongoing (interventions implemented as appropriate)    Problem: Fall Risk (Adult)  Goal: Identify Related Risk Factors and Signs and Symptoms  Outcome: Ongoing (interventions implemented as appropriate)  Goal: Absence of Falls  Outcome: Ongoing (interventions implemented as appropriate)

## 2017-12-18 NOTE — PROGRESS NOTES
Reza Mcclelland  [id number]  1955   LOS: 0 days   Patient Care Team:  EMILY Romo as PCP - General (Family Medicine)  Andrea Guevara MD as Cardiologist (Cardiology)    Chief Complaint:  SOB / WEAKNESS / STROKE    Subjective     Generally comfortable up and about room and walked in villaseñor yesterday without difficulty or complaint.  He currently denies anginal type chest discomfort, increased shortness of breath, nausea, emesis, headache, additional focal motor-sensorychanges, abdominal pain, hemoptysis, pleurisy, or sputum production.    Objective     Vital Sign Min/Max for last 24 hours  Temp  Min: 96.5 °F (35.8 °C)  Max: 97.6 °F (36.4 °C)   BP  Min: 101/75  Max: 116/63   Pulse  Min: 69  Max: 85   Resp  Min: 16  Max: 18   SpO2  Min: 91 %  Max: 99 %   Flow (L/min)  Min: 2  Max: 2   Weight  Min: 88.3 kg (194 lb 9.6 oz)  Max: 88.3 kg (194 lb 9.6 oz)     Last 2 weights    12/17/17  0600 12/18/17  0610   Weight: 89.1 kg (196 lb 8 oz) 88.3 kg (194 lb 9.6 oz)         Intake/Output Summary (Last 24 hours) at 12/18/17 0811  Last data filed at 12/18/17 0714   Gross per 24 hour   Intake              600 ml   Output              600 ml   Net                0 ml       Physical Exam:     General Appearance:    Alert, cooperative, in no acute distress   Lungs:     Clear to auscultation,respirations regular, even and                   unlabored    Heart:    Regular and normal rate, normal S1 and S2, grade 1/6            murmur, no gallop, no rub, no click   Abdomen:  Extremities:   Soft, non-tender, bowel sounds audible x4    No edema, normal range of motion   Pulses:   Pulses palpable and equal bilaterally      Results Review:     Results from last 7 days  Lab Units 12/16/17  0831 12/15/17  1251   SODIUM mmol/L 137 137   POTASSIUM mmol/L 3.5 4.1   CHLORIDE mmol/L 98* 98*   CO2 mmol/L 30.0 27.0   BUN mg/dL 32* 30*   CREATININE mg/dL 1.50* 1.40*   GLUCOSE mg/dL 102* 128*   CALCIUM mg/dL 9.3 9.2       Results from last 7  days  Lab Units 12/16/17  0831 12/15/17  1251   WBC 10*3/mm3 16.82* 18.08*   HEMOGLOBIN g/dL 17.5 16.7   HEMATOCRIT % 51.3* 49.3   PLATELETS 10*3/mm3 215 231       Results from last 7 days  Lab Units 12/16/17  0831   CHOLESTEROL mg/dL 129   TRIGLYCERIDES mg/dL 163*   HDL CHOL mg/dL 39*       Results from last 7 days  Lab Units 12/16/17  0831   HEMOGLOBIN A1C % 7.20*     · CXR:    FINDINGS:   1. Cardiomegaly is noted. There is a multilead ICD pacemaker  defibrillator in place from the left with a coronary sinus lead.      2. Although there is a slight increased interstitial fibrotic pattern,  there is no edema, no free fluid and no active disease.          IMPRESSION:  Cardiomegaly with mild interstitial scarring. Well-positioned pacemaker.  No evidence of edema, free fluid or consolidation.    · NO REPEAT EKG / CXR.    · ECHO:  · The left ventricular cavity is severely dilated.  · Left atrial cavity size is mild-to-moderately dilated.  · Mild aortic valve regurgitation is present.  · Moderate-to-severe mitral valve regurgitation is present  · Left ventricular systolic function is moderately decreased. Estimated EF = 30%.  · Left ventricular wall thickness is consistent with mild concentric hypertrophy.  · Left ventricular diastolic dysfunction (grade I a) consistent with impaired relaxation.  · Mild tricuspid valve regurgitation is present.  · There is no evidence of pericardial effusion.  · No evidence of pulmonary hypertension is present.  · The aortic valve exhibits sclerosis.  · No IV Lumason or IV saline contrast studies performed; cannot exclude intracardiac source of embolus.    · CAROTIDS:  · Left internal carotid artery stenosis totally occluded.  · Right internal carotid artery stenosis of 0-49%.  · Proximal right internal carotid artery plaque without significant stenosis.  · Marked reduction in right carotid artery flow velocities suggestive of overall reduced cardiac output.  · Incidental bidirectional  vertebral artery flow with reduced velocity.    Medication Review: REVIEWED    Assessment/Plan     Persistent significant facial droop and mild dysphagia without acute cardiopulmonary complaints currently.  Would not attempt to pursue CTA or carotid angiography at this time in view of marginal renal status and recent acute events.  We'll keep anticoagulated and attempt to treat congestive heart failure.  He will need instruction in special diet at the time of discharge.  He and his significant other decline consideration of inpatient physical therapy as well as outpatient home health care monitoring at this time.  No tobacco use is again stressed and emphasized and patient and significant other state they will be compliant.  Would allow home on the following discharge cardiac medications and treatment:    · Amiodarone 200 mg daily  · Apixaban 2.5 mg BID  · Atorvastatin 80 mg daily  · Symbicort 160-4.5 2 puffs BID  · Carvedilol 6.25 mg BID  · Torsemide 10 mg daily  · DUO-NEB nebulizer 3 cc 4 times a day when necessary  · Chantix  · Spironolactone 25 mg daily  · Brilinta 90 mg BID  · NTG when necessary  · Follow-up [facility] CHF clinic one-2 weeks with BMP and if acceptable stable serum creatinine initiate Entresto 24/26 twice a day  · [facility] cardiology follow-up January 2018    Principal Problem:    Acute on chronic systolic congestive heart failure  Active Problems:    Coronary artery disease involving native coronary artery of native heart without angina pectoris    Ischemic cardiomyopathy    Paroxysmal atrial fibrillation    Hyperlipidemia LDL goal <70    Chronic kidney disease (CKD), stage III (moderate)    Essential hypertension    COPD    Cardiac resynchronization therapy defibrillator (CRT-D) in place    Leukocytosis    Silent aspiration        12/18/17  8:11 AM

## 2017-12-27 ENCOUNTER — TELEPHONE (OUTPATIENT)
Dept: CARDIOLOGY | Facility: CLINIC | Age: 62
End: 2017-12-27

## 2017-12-27 RX ORDER — FUROSEMIDE 20 MG/1
TABLET ORAL
Qty: 60 TABLET | Refills: 9 | Status: SHIPPED | OUTPATIENT
Start: 2017-12-27 | End: 2018-01-03

## 2017-12-27 RX ORDER — OMEPRAZOLE 40 MG/1
CAPSULE, DELAYED RELEASE ORAL
Qty: 30 CAPSULE | Refills: 5 | Status: SHIPPED | OUTPATIENT
Start: 2017-12-27 | End: 2019-03-12

## 2017-12-27 RX ORDER — SPIRONOLACTONE 25 MG/1
TABLET ORAL
Qty: 90 TABLET | Refills: 1 | Status: SHIPPED | OUTPATIENT
Start: 2017-12-27 | End: 2018-07-10 | Stop reason: SDUPTHER

## 2017-12-27 RX ORDER — CARVEDILOL 6.25 MG/1
TABLET ORAL
Qty: 60 TABLET | Refills: 9 | Status: SHIPPED | OUTPATIENT
Start: 2017-12-27 | End: 2019-03-12

## 2017-12-27 NOTE — TELEPHONE ENCOUNTER
Patients significant other called and stated the patient was not urinating a lot since starting the Torsemide. She says he lost some weight and his edema is gone and he is having no shortness of breath. She said he is also drinking plenty and his blood pressure is good. She said that when he was on Lasix it seemed to work better.  Do you want him to stop or just take PRN.    Please advise.

## 2017-12-29 NOTE — TELEPHONE ENCOUNTER
Per KTS would continue Torsemide and take an extra one if increased edema, SOB, and weight greater than 2lbs over 24 hours.  Left message for patient.

## 2018-01-03 ENCOUNTER — HOSPITAL ENCOUNTER (OUTPATIENT)
Dept: CARDIOLOGY | Facility: HOSPITAL | Age: 63
Discharge: HOME | End: 2018-01-03
Admitting: NURSE PRACTITIONER

## 2018-01-03 ENCOUNTER — OFFICE VISIT (OUTPATIENT)
Dept: CARDIOLOGY | Facility: HOSPITAL | Age: 63
End: 2018-01-03

## 2018-01-03 VITALS
TEMPERATURE: 98 F | WEIGHT: 195.6 LBS | RESPIRATION RATE: 18 BRPM | OXYGEN SATURATION: 100 % | BODY MASS INDEX: 29.64 KG/M2 | DIASTOLIC BLOOD PRESSURE: 66 MMHG | HEART RATE: 82 BPM | SYSTOLIC BLOOD PRESSURE: 100 MMHG | HEIGHT: 68 IN

## 2018-01-03 DIAGNOSIS — I65.22 CAROTID OCCLUSION, LEFT: ICD-10-CM

## 2018-01-03 DIAGNOSIS — I25.10 CORONARY ARTERY DISEASE INVOLVING NATIVE CORONARY ARTERY OF NATIVE HEART WITHOUT ANGINA PECTORIS: ICD-10-CM

## 2018-01-03 DIAGNOSIS — N18.30 CHRONIC KIDNEY DISEASE (CKD), STAGE III (MODERATE) (HCC): ICD-10-CM

## 2018-01-03 DIAGNOSIS — I48.0 PAF (PAROXYSMAL ATRIAL FIBRILLATION) (HCC): ICD-10-CM

## 2018-01-03 DIAGNOSIS — I63.9 CEREBROVASCULAR ACCIDENT (CVA), UNSPECIFIED MECHANISM (HCC): ICD-10-CM

## 2018-01-03 DIAGNOSIS — I50.22 CHRONIC SYSTOLIC HEART FAILURE (HCC): Primary | ICD-10-CM

## 2018-01-03 DIAGNOSIS — I25.5 ISCHEMIC CARDIOMYOPATHY: ICD-10-CM

## 2018-01-03 DIAGNOSIS — I10 ESSENTIAL HYPERTENSION: ICD-10-CM

## 2018-01-03 DIAGNOSIS — J41.0 SIMPLE CHRONIC BRONCHITIS (HCC): ICD-10-CM

## 2018-01-03 LAB
ANION GAP SERPL CALCULATED.3IONS-SCNC: 13 MMOL/L (ref 3–11)
BUN BLD-MCNC: 16 MG/DL (ref 9–23)
BUN/CREAT SERPL: 10 (ref 7–25)
CALCIUM SPEC-SCNC: 9.5 MG/DL (ref 8.7–10.4)
CHLORIDE SERPL-SCNC: 102 MMOL/L (ref 99–109)
CO2 SERPL-SCNC: 23 MMOL/L (ref 20–31)
CREAT BLD-MCNC: 1.6 MG/DL (ref 0.6–1.3)
GFR SERPL CREATININE-BSD FRML MDRD: 44 ML/MIN/1.73
GLUCOSE BLD-MCNC: 98 MG/DL (ref 70–100)
POTASSIUM BLD-SCNC: 4.1 MMOL/L (ref 3.5–5.5)
SODIUM BLD-SCNC: 138 MMOL/L (ref 132–146)

## 2018-01-03 PROCEDURE — 80048 BASIC METABOLIC PNL TOTAL CA: CPT | Performed by: NURSE PRACTITIONER

## 2018-01-03 PROCEDURE — 93005 ELECTROCARDIOGRAM TRACING: CPT | Performed by: NURSE PRACTITIONER

## 2018-01-03 PROCEDURE — 99214 OFFICE O/P EST MOD 30 MIN: CPT | Performed by: NURSE PRACTITIONER

## 2018-01-03 PROCEDURE — 93010 ELECTROCARDIOGRAM REPORT: CPT | Performed by: INTERNAL MEDICINE

## 2018-01-03 NOTE — PROGRESS NOTES
[facility]  [facility] Valve [facility]      Encounter Date:01/03/2018     Reza Mcclelland  [address] [place] [address]  [phone]    1955    EMILY Romo    Reza Mcclelland is a 62 y.o. male.      Subjective:     Chief Complaint:  Congestive Heart Failure       HPI     She admitted to [facility] on 12/15/17 discharge 12/18/17 his a direct admit from [facility] with dyspnea mild, marginal renal function, status post CVA 12/9/17 when he is hospitalized at [facility] until 12/13/17.  He is admitted to [facility] for decompensated CHF and residual effects from CVA with significant decline in health over 5 months.  BNP upon admission 637 with echocardiogram EF 30%, moderate to severe MR.  ET head negative for acute infarct.  Patient charted on Chantix at discharge for smoking cessation.  Patient being seen for follow-up 2 weeks posthospitalization for evaluation in the heart and valve Center      Patient states weight is stable.  Denies edema, abdominal fullness.  Dyspnea is intermittent and described as moderate.  Denies chest pain, pressure.  Reports intermittent palpitations with activity.  Reports not sleeping well at night due to anxiety more than dyspnea.  Denies wheezing, cough.  States he is using inhalers as prescribed.  Reports smoking cessation with use of Chantix since discharge.  Denies dizziness, syncope.  Continues to have left facial droop and drooling.  Reports numbness and tingling in face and hands have improved.  Lasix was discontinued during hospital stay and patient was started on torsemide.        Patient Active Problem List    Diagnosis   • Acute on chronic systolic congestive heart failure [I50.23]     Overview Note:     · Echo (12/15/17): LVEF 30%.  Severe LV dilatation.  Mild to moderate LA dilatation.  Artery to severe MR.     • Leukocytosis [D72.829]   • Silent aspiration [T17.900A]   • COPD [J41.0]   • Cardiac resynchronization  therapy defibrillator (CRT-D) in place [Z95.810]   • Carotid occlusion, left [I65.22]   • Coronary artery disease involving native coronary artery of native heart without angina pectoris [I25.10]     Overview Note:     · Cardiac catheterization for NSTEMI (11/19/2008): PCI of ramus a 3.0 x 12 mm Xience drug-eluting stent, 11/19/2008.  · Cardiac catheterization (12/4/2008): PCI to mid and proximal LAD.  Sure staged PCI of RCA planned  · Cardiac catheterization (12/18/2008): PCI to RCA.    · Cardiac catheterization for recurrent CCS class II-III angina  (01/21/2011): NACHO to proximal and distal LAD.  Widely patent stents in RCA.  LVEF normal at 65%.    · Echo (2013): LVEF 25%.  Moderate MR.  Left bundle branch block. .  · Cardiac catheterization for recurrent angina (4/2013): Stable CAD with RCA occlusion and patent stents in the left coronary system.  · Cardiac cath for NSTEMI  (08/20/2014): NACHO to the ramus intermedius:  Nonobstructive 50% to 60% mid to distal left anterior descending stenosis. Chronic total occlusion of the dominant right coronary artery served by left-sided collaterals with left ventricular systolic dysfunction. LVEF 15%.     • Ischemic cardiomyopathy [I25.5]     Overview Note:     · Echo (spring 2013): Acutely reduced LVEF 25%.  Moderate MR.  Left bundle branch block. .  · CRT-D implant ([employer], model #[id number]) by Dr. Kong.  · LVEF 10% to 15% noted on cardiac cath 08/20/2014.  · Echo (12/15/17): Mild AI & TR. LVEF 30%.      • Peripheral vascular disease [I73.9]     Overview Note:     · Cardiac catheterization revealing total occlusion of left internal carotid artery previously in 2011.  · CT angiogram of the neck showing total occlusion of the left internal carotid artery, 50% to 60% stenosis of the right carotid bulb, stent in the brachiocephalic artery placed by Dr. Santamaria in November 2011, totally occluded, March 2013.  · Occluded left carotid artery with occluded innominate  artery and intracranial circulation via thyroid artery collaterals and left vertebral with left axillary to right axillary bypass graft using 8 mm. PTFE ringed Propaten graft with arteriogram to the right subclavian artery and right axillary artery and right carotid artery, April 2013.         • Cerebrovascular accident [I63.9]     Overview Note:     · Cerebrovascular accident and initiation of warfarin therapy, March 2013.     • Paroxysmal atrial fibrillation [I48.0]     Overview Note:     · Atrial fibrillation on presentation to outside hospital converted to sinus with Cardizem drip and subsequent recurrence of atrial fibrillation while in the ICU, treated with amiodarone and returned to sinus, August 2014.  · Chads Vasc=6, on Eliquis     • Hyperlipidemia LDL goal <70 [E78.5]   • Chronic kidney disease (CKD), stage III (moderate) [N18.3]   • Left bundle branch block [I44.7]   • Essential hypertension [I10]         Past Surgical History:   Procedure Laterality Date   • AXILLARY AXILLARY BYPASS     • CAROTID STENT         No Known Allergies      Current Outpatient Prescriptions:   •  amiodarone (PACERONE) 200 MG tablet, Take 1 tablet by mouth Daily., Disp: 90 tablet, Rfl: 2  •  apixaban (ELIQUIS) 2.5 MG tablet tablet, Take 2.5 mg by mouth 2 (Two) Times a Day., Disp: , Rfl:   •  atorvastatin (LIPITOR) 80 MG tablet, Take 80 mg by mouth Every Night., Disp: , Rfl:   •  budesonide-formoterol (SYMBICORT) 160-4.5 MCG/ACT inhaler, Inhale 2 puffs 2 (Two) Times a Day., Disp: 1 inhaler, Rfl: 12  •  carvedilol (COREG) 6.25 MG tablet, take 1 tablet by mouth twice a day with food, Disp: 60 tablet, Rfl: 9  •  [id number]-[id number]-[id number] 3-Phytoster (ANIMI-3/VITAMIN D PO), Take  by mouth Daily., Disp: , Rfl:   •  nitroglycerin (NITROSTAT) 0.4 MG SL tablet, 1 under the tongue as needed for angina, may repeat q5mins for up three doses, Disp: 100 tablet, Rfl: 11  •  omeprazole (priLOSEC) 40 MG capsule, take 1 capsule by mouth once daily, Disp:  30 capsule, Rfl: 5  •  potassium chloride (K-DUR,KLOR-CON) 20 MEQ CR tablet, take 1 tablet by mouth once daily, Disp: 30 tablet, Rfl: 5  •  spironolactone (ALDACTONE) 25 MG tablet, take 1 tablet by mouth once daily, Disp: 90 tablet, Rfl: 1  •  ticagrelor (BRILINTA) 90 MG tablet tablet, Take 90 mg by mouth 2 (Two) Times a Day., Disp: , Rfl:   •  torsemide (DEMADEX) 10 MG tablet, Take 1 tablet by mouth Daily., Disp: 30 tablet, Rfl: 5  •  varenicline (CHANTIX) 0.5 MG tablet, Take 1 tablet by mouth 2 (Two) Times a Day., Disp: 60 tablet, Rfl: 5  •  VENTOLIN  (90 BASE) MCG/ACT inhaler, As Needed., Disp: , Rfl: 0  •  albuterol (PROVENTIL) (5 MG/ML) 0.5% nebulizer solution, Take 0.5 mL by nebulization Every 6 (Six) Hours As Needed for Wheezing., Disp: 1 mL, Rfl: 12    The following portions of the patient's history were reviewed and updated as appropriate: allergies, current medications, past family history, past medical history, past social history, past surgical history and problem list.    Review of Systems   Constitution: Positive for weakness and weight loss (15 lbs in 1 month). Negative for chills, decreased appetite, diaphoresis, fever, malaise/fatigue, night sweats and weight gain.   HENT: Negative for congestion and nosebleeds.    Eyes: Negative for blurred vision, visual disturbance and visual halos.   Cardiovascular: Positive for dyspnea on exertion and paroxysmal nocturnal dyspnea. Negative for chest pain, cyanosis, leg swelling, near-syncope, palpitations and syncope.   Respiratory: Positive for shortness of breath and sputum production. Negative for cough, hemoptysis, sleep disturbances due to breathing, snoring and wheezing.    Endocrine: Positive for cold intolerance and polydipsia. Negative for heat intolerance, polyphagia and polyuria.   Hematologic/Lymphatic: Bruises/bleeds easily.   Skin: Negative for dry skin, itching and rash.   Musculoskeletal: Negative for falls, joint pain, joint swelling,  "muscle weakness and myalgias.   Gastrointestinal: Negative for bloating, abdominal pain, constipation, diarrhea, dysphagia, heartburn, melena, nausea and vomiting.   Genitourinary: Negative for dysuria, flank pain, hematuria and nocturia.   Neurological: Negative for difficulty with concentration, excessive daytime sleepiness, dizziness, headaches and loss of balance.   Psychiatric/Behavioral: Positive for depression. Negative for altered mental status, suicidal ideas and thoughts of violence. The patient is nervous/anxious.    Allergic/Immunologic: Negative for environmental allergies.       Objective:     Vitals:    01/03/18 1414 01/03/18 1419 01/03/18 1424   BP: 91/65 115/71 100/66   BP Location: Right arm Left arm Left arm   Patient Position: Sitting Sitting Standing   Pulse: 75 70 82   Resp: 18     Temp: 98 °F (36.7 °C)     TempSrc: Temporal Artery      SpO2: 100%     Weight: 88.7 kg (195 lb 9.6 oz)     Height: 172.7 cm (68\")           Physical Exam   Constitutional: He is oriented to person, place, and time. He appears well-developed and well-nourished. No distress.   HENT:   Head: Normocephalic and atraumatic.   Mouth/Throat: Oropharynx is clear and moist.   Eyes: Conjunctivae are normal. Pupils are equal, round, and reactive to light. No scleral icterus.   Neck: No hepatojugular reflux and no JVD present. Carotid bruit is not present. No tracheal deviation present. No thyromegaly present.   Cardiovascular: Normal rate, regular rhythm, normal heart sounds and intact distal pulses.  Exam reveals no friction rub.    No murmur heard.  Pulmonary/Chest: Effort normal and breath sounds normal.   Abdominal: Soft. Bowel sounds are normal. He exhibits no distension. There is no tenderness.   Musculoskeletal: He exhibits no edema.   Lymphadenopathy:     He has no cervical adenopathy.   Neurological: He is alert and oriented to person, place, and time.   Skin: Skin is warm, dry and intact. No rash noted. No cyanosis or " erythema. No pallor.   Psychiatric: He has a normal mood and affect. His behavior is normal. Thought content normal.   Vitals reviewed.      Lab and Diagnostic Review:  Results for orders placed or performed in visit on 01/03/18   Basic Metabolic Panel   Result Value Ref Range    Glucose 98 70 - 100 mg/dL    BUN 16 9 - 23 mg/dL    Creatinine 1.60 (H) 0.60 - 1.30 mg/dL    Sodium 138 132 - 146 mmol/L    Potassium 4.1 3.5 - 5.5 mmol/L    Chloride 102 99 - 109 mmol/L    CO2 23.0 20.0 - 31.0 mmol/L    Calcium 9.5 8.7 - 10.4 mg/dL    eGFR Non African Amer 44 (L) >60 mL/min/1.73    BUN/Creatinine Ratio 10.0 7.0 - 25.0    Anion Gap 13.0 (H) 3.0 - 11.0 mmol/L       12/06/17  Glucose 70 - 100 mg/dL 102 (H)   BUN 9 - 23 mg/dL 32 (H)   Creatinine 0.60 - 1.30 mg/dL 1.50 (H)   Sodium 132 - 146 mmol/L 137   Potassium 3.5 - 5.5 mmol/L 3.5   Chloride 99 - 109 mmol/L 98 (L)   CO2 20.0 - 31.0 mmol/L 30.0   Calcium 8.7 - 10.4 mg/dL 9.3   eGFR Non African Amer >60 mL/min/1.73 47 (L)   BUN/Creatinine Ratio 7.0 - 25.0 21.3   Anion Gap 3.0 - 11.0 mmol/L 9.0       Assessment and Plan:         1. Chronic systolic heart failure  EF 30%, NYHA III, s/p ICD  Continue coreg, and aldactone  Hold on ace/arb/arni at this time due to low BP and creatinine.    - Basic Metabolic Panel    Heart failure education discussed: What is heart failure, causes, signs and symptoms, medication management, daily weight monitoring, low-sodium diet of less than 2 g per day, daily exercise, role the heart failure center.    2. Coronary artery disease involving native coronary artery of native heart without angina pectoris  S/p stents with recent angioplasty  Brilinta, statin  3. Ischemic cardiomyopathy      4. Essential hypertension      5. PAF (paroxysmal atrial fibrillation)  Eliquis  - ECG 12 Lead; AV paced, 70 bpm  [employer] pacemaker interrogation.  Atrial paced 91%, BIV paced greater than 99%.  No A. fib burden.  0% mode switch.    6. Cerebrovascular accident  (CVA), unspecified mechanism  Hx of CVA, recent CVA s/s of facial drooping, No change in CT scan    7. Carotid occlusion, left      8. COPD  Recent sterilization for respiratory failure, pneumonia, COPD exacerbation  Continue inhaler and nebs as directed  Recent cessation of tobacco use approximately 2 weeks ago.  Continue use of Chantix.  Education provided on the importance of smoking cessation, current diagnoses.  Verbal encouragement given    9. Chronic kidney disease (CKD), stage III (moderate)  Creatine 1.6 today at d/c 1.5  Creatine 1.6 03/30/15, ?baseline 1.4-1.6    Pt to f/u with Dr. Guevara as scheduled.  Pt to f/u [facility] 8-12 weeks or sooner if needed or as determined by cardiology.    *Please note that portions of this note were completed with a voice recognition program. Efforts were made to edit the dictations, but occasionally words are mistranscribed.

## 2018-01-16 RX ORDER — ATORVASTATIN CALCIUM 80 MG/1
TABLET, FILM COATED ORAL
Qty: 30 TABLET | Refills: 5 | Status: SHIPPED | OUTPATIENT
Start: 2018-01-16 | End: 2018-07-25 | Stop reason: SDUPTHER

## 2018-01-31 ENCOUNTER — OFFICE VISIT (OUTPATIENT)
Dept: CARDIOLOGY | Facility: CLINIC | Age: 63
End: 2018-01-31

## 2018-01-31 VITALS
HEART RATE: 70 BPM | BODY MASS INDEX: 28.44 KG/M2 | DIASTOLIC BLOOD PRESSURE: 73 MMHG | SYSTOLIC BLOOD PRESSURE: 91 MMHG | HEIGHT: 69 IN | WEIGHT: 192 LBS

## 2018-01-31 DIAGNOSIS — I25.10 CORONARY ARTERY DISEASE INVOLVING NATIVE CORONARY ARTERY OF NATIVE HEART WITHOUT ANGINA PECTORIS: Primary | ICD-10-CM

## 2018-01-31 DIAGNOSIS — I25.5 ISCHEMIC CARDIOMYOPATHY: ICD-10-CM

## 2018-01-31 DIAGNOSIS — I50.23 ACUTE ON CHRONIC SYSTOLIC CONGESTIVE HEART FAILURE (HCC): ICD-10-CM

## 2018-01-31 PROCEDURE — 99214 OFFICE O/P EST MOD 30 MIN: CPT | Performed by: INTERNAL MEDICINE

## 2018-05-02 ENCOUNTER — LAB (OUTPATIENT)
Dept: LAB | Facility: HOSPITAL | Age: 63
End: 2018-05-02

## 2018-05-02 ENCOUNTER — OFFICE VISIT (OUTPATIENT)
Dept: CARDIOLOGY | Facility: HOSPITAL | Age: 63
End: 2018-05-02

## 2018-05-02 VITALS
HEART RATE: 76 BPM | OXYGEN SATURATION: 98 % | RESPIRATION RATE: 16 BRPM | BODY MASS INDEX: 25.74 KG/M2 | TEMPERATURE: 98 F | HEIGHT: 69 IN | DIASTOLIC BLOOD PRESSURE: 71 MMHG | SYSTOLIC BLOOD PRESSURE: 97 MMHG | WEIGHT: 173.8 LBS

## 2018-05-02 DIAGNOSIS — I48.0 PAROXYSMAL ATRIAL FIBRILLATION (HCC): ICD-10-CM

## 2018-05-02 DIAGNOSIS — I25.10 CORONARY ARTERY DISEASE INVOLVING NATIVE CORONARY ARTERY OF NATIVE HEART WITHOUT ANGINA PECTORIS: ICD-10-CM

## 2018-05-02 DIAGNOSIS — I50.22 CHRONIC SYSTOLIC HEART FAILURE (HCC): Primary | ICD-10-CM

## 2018-05-02 DIAGNOSIS — E78.5 HYPERLIPIDEMIA LDL GOAL <70: ICD-10-CM

## 2018-05-02 DIAGNOSIS — R73.9 BLOOD GLUCOSE ELEVATED: ICD-10-CM

## 2018-05-02 DIAGNOSIS — N18.30 CHRONIC KIDNEY DISEASE (CKD), STAGE III (MODERATE) (HCC): ICD-10-CM

## 2018-05-02 DIAGNOSIS — I95.2 HYPOTENSION DUE TO DRUGS: ICD-10-CM

## 2018-05-02 DIAGNOSIS — I50.22 CHRONIC SYSTOLIC HEART FAILURE (HCC): ICD-10-CM

## 2018-05-02 DIAGNOSIS — N18.30 STAGE 3 CHRONIC KIDNEY DISEASE (HCC): Primary | ICD-10-CM

## 2018-05-02 LAB
ALBUMIN SERPL-MCNC: 4.7 G/DL (ref 3.2–4.8)
ALBUMIN/GLOB SERPL: 2 G/DL (ref 1.5–2.5)
ALP SERPL-CCNC: 83 U/L (ref 25–100)
ALT SERPL W P-5'-P-CCNC: 7 U/L (ref 7–40)
ANION GAP SERPL CALCULATED.3IONS-SCNC: 8 MMOL/L (ref 3–11)
ARTICHOKE IGE QN: 53 MG/DL (ref 0–130)
AST SERPL-CCNC: 12 U/L (ref 0–33)
BILIRUB SERPL-MCNC: 0.9 MG/DL (ref 0.3–1.2)
BUN BLD-MCNC: 27 MG/DL (ref 9–23)
BUN/CREAT SERPL: 12.9 (ref 7–25)
CALCIUM SPEC-SCNC: 9.5 MG/DL (ref 8.7–10.4)
CHLORIDE SERPL-SCNC: 100 MMOL/L (ref 99–109)
CHOLEST SERPL-MCNC: 101 MG/DL (ref 0–200)
CO2 SERPL-SCNC: 28 MMOL/L (ref 20–31)
CREAT BLD-MCNC: 2.1 MG/DL (ref 0.6–1.3)
DEPRECATED RDW RBC AUTO: 44.6 FL (ref 37–54)
ERYTHROCYTE [DISTWIDTH] IN BLOOD BY AUTOMATED COUNT: 13.7 % (ref 11.3–14.5)
GFR SERPL CREATININE-BSD FRML MDRD: 32 ML/MIN/1.73
GLOBULIN UR ELPH-MCNC: 2.3 GM/DL
GLUCOSE BLD-MCNC: 101 MG/DL (ref 70–100)
HBA1C MFR BLD: 5.6 % (ref 4.8–5.6)
HCT VFR BLD AUTO: 45.5 % (ref 38.9–50.9)
HDLC SERPL-MCNC: 38 MG/DL (ref 40–60)
HGB BLD-MCNC: 15.3 G/DL (ref 13.1–17.5)
MCH RBC QN AUTO: 30.6 PG (ref 27–31)
MCHC RBC AUTO-ENTMCNC: 33.6 G/DL (ref 32–36)
MCV RBC AUTO: 91 FL (ref 80–99)
PLATELET # BLD AUTO: 179 10*3/MM3 (ref 150–450)
PMV BLD AUTO: 10.2 FL (ref 6–12)
POTASSIUM BLD-SCNC: 4.3 MMOL/L (ref 3.5–5.5)
PROT SERPL-MCNC: 7 G/DL (ref 5.7–8.2)
RBC # BLD AUTO: 5 10*6/MM3 (ref 4.2–5.76)
SODIUM BLD-SCNC: 136 MMOL/L (ref 132–146)
T4 FREE SERPL-MCNC: 1.47 NG/DL (ref 0.89–1.76)
TRIGL SERPL-MCNC: 74 MG/DL (ref 0–150)
TSH SERPL DL<=0.05 MIU/L-ACNC: 3.35 MIU/ML (ref 0.35–5.35)
WBC NRBC COR # BLD: 10.67 10*3/MM3 (ref 3.5–10.8)

## 2018-05-02 PROCEDURE — 36415 COLL VENOUS BLD VENIPUNCTURE: CPT

## 2018-05-02 PROCEDURE — 80053 COMPREHEN METABOLIC PANEL: CPT

## 2018-05-02 PROCEDURE — 83036 HEMOGLOBIN GLYCOSYLATED A1C: CPT

## 2018-05-02 PROCEDURE — 84439 ASSAY OF FREE THYROXINE: CPT

## 2018-05-02 PROCEDURE — 99214 OFFICE O/P EST MOD 30 MIN: CPT | Performed by: NURSE PRACTITIONER

## 2018-05-02 PROCEDURE — 84443 ASSAY THYROID STIM HORMONE: CPT

## 2018-05-02 PROCEDURE — 85027 COMPLETE CBC AUTOMATED: CPT

## 2018-05-02 PROCEDURE — 80061 LIPID PANEL: CPT

## 2018-05-02 RX ORDER — TORSEMIDE 10 MG/1
10 TABLET ORAL AS NEEDED
Qty: 15 TABLET | Refills: 5
Start: 2018-05-02 | End: 2018-08-07 | Stop reason: SDUPTHER

## 2018-05-02 NOTE — PROGRESS NOTES
Called and reviewed labs.    Results for orders placed or performed in visit on 05/02/18   Comprehensive Metabolic Panel   Result Value Ref Range    Glucose 101 (H) 70 - 100 mg/dL    BUN 27 (H) 9 - 23 mg/dL    Creatinine 2.10 (H) 0.60 - 1.30 mg/dL    Sodium 136 132 - 146 mmol/L    Potassium 4.3 3.5 - 5.5 mmol/L    Chloride 100 99 - 109 mmol/L    CO2 28.0 20.0 - 31.0 mmol/L    Calcium 9.5 8.7 - 10.4 mg/dL    Total Protein 7.0 5.7 - 8.2 g/dL    Albumin 4.70 3.20 - 4.80 g/dL    ALT (SGPT) 7 7 - 40 U/L    AST (SGOT) 12 0 - 33 U/L    Alkaline Phosphatase 83 25 - 100 U/L    Total Bilirubin 0.9 0.3 - 1.2 mg/dL    eGFR Non African Amer 32 (L) >60 mL/min/1.73    Globulin 2.3 gm/dL    A/G Ratio 2.0 1.5 - 2.5 g/dL    BUN/Creatinine Ratio 12.9 7.0 - 25.0    Anion Gap 8.0 3.0 - 11.0 mmol/L   CBC (No Diff)   Result Value Ref Range    WBC 10.67 3.50 - 10.80 10*3/mm3    RBC 5.00 4.20 - 5.76 10*6/mm3    Hemoglobin 15.3 13.1 - 17.5 g/dL    Hematocrit 45.5 38.9 - 50.9 %    MCV 91.0 80.0 - 99.0 fL    MCH 30.6 27.0 - 31.0 pg    MCHC 33.6 32.0 - 36.0 g/dL    RDW 13.7 11.3 - 14.5 %    RDW-SD 44.6 37.0 - 54.0 fl    MPV 10.2 6.0 - 12.0 fL    Platelets 179 150 - 450 10*3/mm3   TSH   Result Value Ref Range    TSH 3.350 0.350 - 5.350 mIU/mL   T4, Free   Result Value Ref Range    Free T4 1.47 0.89 - 1.76 ng/dL   Hemoglobin A1c   Result Value Ref Range    Hemoglobin A1C 5.60 4.80 - 5.60 %   Lipid Panel   Result Value Ref Range    Total Cholesterol 101 0 - 200 mg/dL    Triglycerides 74 0 - 150 mg/dL    HDL Cholesterol 38 (L) 40 - 60 mg/dL    LDL Cholesterol  53 0 - 130 mg/dL     Pt will hold torsemide.  Repeat BMP 2 weeks. Take Torsemide only with 3 lb or more weigh gain, edema, or worsening dyspnea.  If needed for 2-3 day and no improvement call for evaluation.

## 2018-05-02 NOTE — PROGRESS NOTES
[facility]  [facility]      Encounter Date:05/02/2018     eRza Mcclelland  [address] [facility] [id number] [phone]    1955    EMILY Romo    Reza Mcclelland is a 63 y.o. male.      Subjective:     Chief Complaint:  Follow-up (SHF, AFib)       HPI     Patient with chronic systolic heart failure, EF 30%, with moderate to severe MR as of December 2017.  Status post ICD.  History of CVA at that time.  History of CAD status post stents, PAF.  Chronic kidney disease baseline 1.4-1.6. Pt has continued to not smoke.  Patient presents for follow-up.    Has not seen PCP.  No recent labs.  Denies CP, pressure, palpitations, dizziness, syncope, edema, orthopnea/PND.  Pt states he is not eating well and has had weight loss.  Feels that he is drinking plenty of fluids.      Reports a hx of low BP, but no s/s of hypotension.  NO blood in stool.  C/o bruising        Patient Active Problem List    Diagnosis   • Acute on chronic systolic congestive heart failure [I50.23]     Overview Note:     · Echo (12/15/17): LVEF 30%.  Severe LV dilatation.  Mild to moderate LA dilatation.  Artery to severe MR.     • Leukocytosis [D72.829]   • Silent aspiration [T17.900A]   • COPD [J41.0]   • Cardiac resynchronization therapy defibrillator (CRT-D) in place [Z95.810]   • Carotid occlusion, left [I65.22]   • Coronary artery disease involving native coronary artery of native heart without angina pectoris [I25.10]     Overview Note:     · Cardiac catheterization for NSTEMI (11/19/2008): PCI of ramus a 3.0 x 12 mm Xience drug-eluting stent, 11/19/2008.  · Cardiac catheterization (12/4/2008): PCI to mid and proximal LAD.  Sure staged PCI of RCA planned  · Cardiac catheterization (12/18/2008): PCI to RCA.    · Cardiac catheterization for recurrent CCS class II-III angina  (01/21/2011): NACHO to proximal and distal LAD.  Widely patent stents in RCA.  LVEF normal at 65%.    · Echo (2013): LVEF 25%.   Moderate MR.  Left bundle branch block. .  · Cardiac catheterization for recurrent angina (4/2013): Stable CAD with RCA occlusion and patent stents in the left coronary system.  · Cardiac cath for NSTEMI  (08/20/2014): NACHO to the ramus intermedius:  Nonobstructive 50% to 60% mid to distal left anterior descending stenosis. Chronic total occlusion of the dominant right coronary artery served by left-sided collaterals with left ventricular systolic dysfunction. LVEF 15%.     • Ischemic cardiomyopathy [I25.5]     Overview Note:     · Echo (spring 2013): Acutely reduced LVEF 25%.  Moderate MR.  Left bundle branch block. .  · CRT-D implant ([employer], model #[id number]) by Dr. Kong.  · LVEF 10% to 15% noted on cardiac cath 08/20/2014.  · Echo (12/15/17): Mild AI & TR. LVEF 30%.      • Peripheral vascular disease [I73.9]     Overview Note:     · Cardiac catheterization revealing total occlusion of left internal carotid artery previously in 2011.  · CT angiogram of the neck showing total occlusion of the left internal carotid artery, 50% to 60% stenosis of the right carotid bulb, stent in the brachiocephalic artery placed by Dr. Santamaria in November 2011, totally occluded, March 2013.  · Occluded left carotid artery with occluded innominate artery and intracranial circulation via thyroid artery collaterals and left vertebral with left axillary to right axillary bypass graft using 8 mm. PTFE ringed Propaten graft with arteriogram to the right subclavian artery and right axillary artery and right carotid artery, April 2013.         • Cerebrovascular accident [I63.9]     Overview Note:     · Cerebrovascular accident and initiation of warfarin therapy, March 2013.     • Paroxysmal atrial fibrillation [I48.0]     Overview Note:     · Atrial fibrillation on presentation to outside hospital converted to sinus with Cardizem drip and subsequent recurrence of atrial fibrillation while in the ICU, treated with amiodarone and  returned to sinus, August 2014.  · Chads Vasc=6, on Eliquis     • Hyperlipidemia LDL goal <70 [E78.5]   • Chronic kidney disease (CKD), stage III (moderate) [N18.3]   • Left bundle branch block [I44.7]   • Essential hypertension [I10]         Past Surgical History:   Procedure Laterality Date   • AXILLARY AXILLARY BYPASS     • CAROTID STENT         No Known Allergies      Current Outpatient Prescriptions:   •  albuterol (PROVENTIL) (5 MG/ML) 0.5% nebulizer solution, Take 0.5 mL by nebulization Every 6 (Six) Hours As Needed for Wheezing., Disp: 1 mL, Rfl: 12  •  amiodarone (PACERONE) 200 MG tablet, Take 1 tablet by mouth Daily., Disp: 90 tablet, Rfl: 2  •  apixaban (ELIQUIS) 2.5 MG tablet tablet, Take 2.5 mg by mouth 2 (Two) Times a Day., Disp: , Rfl:   •  atorvastatin (LIPITOR) 80 MG tablet, take 1 tablet by mouth at bedtime, Disp: 30 tablet, Rfl: 5  •  budesonide-formoterol (SYMBICORT) 160-4.5 MCG/ACT inhaler, Inhale 2 puffs 2 (Two) Times a Day., Disp: 1 inhaler, Rfl: 12  •  carvedilol (COREG) 6.25 MG tablet, take 1 tablet by mouth twice a day with food, Disp: 60 tablet, Rfl: 9  •  nitroglycerin (NITROSTAT) 0.4 MG SL tablet, 1 under the tongue as needed for angina, may repeat q5mins for up three doses, Disp: 100 tablet, Rfl: 11  •  omeprazole (priLOSEC) 40 MG capsule, take 1 capsule by mouth once daily, Disp: 30 capsule, Rfl: 5  •  potassium chloride (K-DUR,KLOR-CON) 20 MEQ CR tablet, take 1 tablet by mouth once daily, Disp: 30 tablet, Rfl: 5  •  spironolactone (ALDACTONE) 25 MG tablet, take 1 tablet by mouth once daily, Disp: 90 tablet, Rfl: 1  •  ticagrelor (BRILINTA) 90 MG tablet tablet, Take 1 tablet by mouth 2 (Two) Times a Day., Disp: 60 tablet, Rfl: 6  •  torsemide (DEMADEX) 10 MG tablet, Take 1 tablet by mouth Daily., Disp: 30 tablet, Rfl: 5  •  VENTOLIN  (90 BASE) MCG/ACT inhaler, Inhale 2 puffs Every 6 (Six) Hours As Needed., Disp: , Rfl: 0    The following portions of the patient's history were  reviewed and updated as appropriate: allergies, current medications, past family history, past medical history, past social history, past surgical history and problem list.    Review of Systems   Constitution: Negative. Negative for chills, decreased appetite, diaphoresis, fever, weakness, malaise/fatigue, night sweats, weight gain and weight loss.   HENT: Negative.  Negative for congestion and nosebleeds.    Eyes: Negative.  Negative for blurred vision, visual disturbance and visual halos.   Cardiovascular: Negative.  Negative for chest pain, claudication, cyanosis, dyspnea on exertion, irregular heartbeat, leg swelling, near-syncope, orthopnea, palpitations, paroxysmal nocturnal dyspnea and syncope.   Respiratory: Negative.  Negative for cough, hemoptysis, shortness of breath, sleep disturbances due to breathing, snoring, sputum production and wheezing.    Endocrine: Negative.  Negative for cold intolerance, heat intolerance, polydipsia, polyphagia and polyuria.   Hematologic/Lymphatic: Negative.  Does not bruise/bleed easily.   Skin: Negative.  Negative for dry skin, itching and rash.   Musculoskeletal: Negative.  Negative for falls, joint pain, joint swelling, muscle weakness and myalgias.   Gastrointestinal: Negative.  Negative for bloating, abdominal pain, constipation, diarrhea, dysphagia, heartburn, melena, nausea and vomiting.   Genitourinary: Negative.  Negative for dysuria, flank pain, hematuria and nocturia.   Neurological: Negative.  Negative for difficulty with concentration, excessive daytime sleepiness, dizziness, headaches and loss of balance.   Psychiatric/Behavioral: Negative.  Negative for altered mental status and depression. The patient is not nervous/anxious.    Allergic/Immunologic: Negative.  Negative for environmental allergies.       Objective:     Vitals:    05/02/18 0856 05/02/18 0900 05/02/18 0901   BP: (!) 78/68 98/61 97/71   BP Location: Right arm Left arm Left arm   Patient Position:  "Sitting Sitting Standing   Pulse: 70  76   Resp: 16     Temp: 98 °F (36.7 °C)     TempSrc: Temporal Artery      SpO2: 98%     Weight: 78.8 kg (173 lb 12.8 oz)     Height: 175.3 cm (69.02\")           Physical Exam   Constitutional: He is oriented to person, place, and time. He appears well-developed and well-nourished. No distress.   HENT:   Head: Normocephalic and atraumatic.   Mouth/Throat: Oropharynx is clear and moist.   Eyes: Conjunctivae are normal. Pupils are equal, round, and reactive to light. No scleral icterus.   Neck: No hepatojugular reflux and no JVD present. Carotid bruit is not present. No tracheal deviation present. No thyromegaly present.   Cardiovascular: Normal rate, regular rhythm, normal heart sounds and intact distal pulses.  Exam reveals no friction rub.    No murmur heard.  Pulmonary/Chest: Effort normal and breath sounds normal.   Abdominal: Soft. Bowel sounds are normal. He exhibits no distension. There is no tenderness.   Musculoskeletal: He exhibits no edema.   Lymphadenopathy:     He has no cervical adenopathy.   Neurological: He is alert and oriented to person, place, and time.   Skin: Skin is warm, dry and intact. No rash noted. No cyanosis or erythema. No pallor.   Psychiatric: He has a normal mood and affect. His behavior is normal. Thought content normal.   Vitals reviewed.      Lab and Diagnostic Review:  Lab Results   Component Value Date    GLUCOSE 98 01/03/2018    CALCIUM 9.5 01/03/2018     01/03/2018    K 4.1 01/03/2018    CO2 23.0 01/03/2018     01/03/2018    BUN 16 01/03/2018    CREATININE 1.60 (H) 01/03/2018    EGFRIFNONA 44 (L) 01/03/2018    BCR 10.0 01/03/2018    ANIONGAP 13.0 (H) 01/03/2018     Lab Results   Component Value Date    CHOL 129 12/16/2017    CHLPL 105 08/20/2014    TRIG 163 (H) 12/16/2017    HDL 39 (L) 12/16/2017    LDL 79 12/16/2017       Lab Results   Component Value Date    HGBA1C 7.20 (H) 12/16/2017         Assessment and Plan:         1. " Chronic systolic heart failure  EF 30%  NYHA II-III  No s/s HF worsening  Hold Torsemide and use PRN for dyspnea, edema or weight gain of 3 lbs    - Comprehensive Metabolic Panel; Future  - CBC (No Diff); Future    BB, no ace/arb/arni due to CKD and hypotension    2. Coronary artery disease involving native coronary artery of native heart without angina pectoris  S/p stents   Brilinta  - Lipid Panel; Future    3. Blood glucose elevated    - Hemoglobin A1c; Future    4. Paroxysmal atrial fibrillation  amio  eliquis  - TSH; Future  - T4, Free; Future    5. Chronic kidney disease (CKD), stage III (moderate)  Last Creatine 1.6  Hold torside  6. Hyperlipidemia  Statin    7.  Hypotension  Low BP noted in left arm.  Hx of PAD. No s/s hypotension  Monitor at this time.          *Please note that portions of this note were completed with a voice recognition program. Efforts were made to edit the dictations, but occasionally words are mistranscribed.

## 2018-05-16 ENCOUNTER — TELEPHONE (OUTPATIENT)
Dept: CARDIOLOGY | Facility: HOSPITAL | Age: 63
End: 2018-05-16

## 2018-05-16 ENCOUNTER — LAB (OUTPATIENT)
Dept: LAB | Facility: HOSPITAL | Age: 63
End: 2018-05-16

## 2018-05-16 DIAGNOSIS — N18.30 STAGE 3 CHRONIC KIDNEY DISEASE (HCC): ICD-10-CM

## 2018-05-16 LAB
ANION GAP SERPL CALCULATED.3IONS-SCNC: 5 MMOL/L (ref 3–11)
BUN BLD-MCNC: 18 MG/DL (ref 9–23)
BUN/CREAT SERPL: 11.3 (ref 7–25)
CALCIUM SPEC-SCNC: 9.2 MG/DL (ref 8.7–10.4)
CHLORIDE SERPL-SCNC: 104 MMOL/L (ref 99–109)
CO2 SERPL-SCNC: 27 MMOL/L (ref 20–31)
CREAT BLD-MCNC: 1.6 MG/DL (ref 0.6–1.3)
GFR SERPL CREATININE-BSD FRML MDRD: 44 ML/MIN/1.73
GLUCOSE BLD-MCNC: 102 MG/DL (ref 70–100)
POTASSIUM BLD-SCNC: 4.4 MMOL/L (ref 3.5–5.5)
SODIUM BLD-SCNC: 136 MMOL/L (ref 132–146)

## 2018-05-16 PROCEDURE — 80048 BASIC METABOLIC PNL TOTAL CA: CPT

## 2018-05-16 PROCEDURE — 36415 COLL VENOUS BLD VENIPUNCTURE: CPT

## 2018-05-16 NOTE — TELEPHONE ENCOUNTER
----- Message from Pauline Burnett MA sent at 5/16/2018  2:46 PM EDT -----  Spoke with pt, aware.  No further questions.   Appointment confirmed with Dr. Guevara 6/6/18 at 10:00am    ----- Message -----  From: DAKOTA Rodriguez  Sent: 5/16/2018   2:34 PM  To: Pauline Burnett MA    Continue and discuss with Dr. Guevara next f/u visit.  ----- Message -----  From: Pauline Burnett MA  Sent: 5/16/2018  11:31 AM  To: DAKOTA Rodriguez    Spoke with pt, gave info below.   Pt reports he is taking 2 blood thinners (eliquis and brilenta) and is concerned about his bruising.  He reports even the smallest cut will bleed for 4-5 hours.  Wondering if one can be discontinued?       ----- Message -----  From: DAKOTA Rodriguez  Sent: 5/16/2018  10:16 AM  To: Pauline Burnett MA    Kidney function improved.  Continue to not use Torsemide on a daily basis.  Only use if needed for 1-2 days.  I

## 2018-06-06 ENCOUNTER — OFFICE VISIT (OUTPATIENT)
Dept: CARDIOLOGY | Facility: CLINIC | Age: 63
End: 2018-06-06

## 2018-06-06 VITALS
WEIGHT: 168 LBS | DIASTOLIC BLOOD PRESSURE: 62 MMHG | BODY MASS INDEX: 24.88 KG/M2 | HEART RATE: 70 BPM | HEIGHT: 69 IN | SYSTOLIC BLOOD PRESSURE: 91 MMHG

## 2018-06-06 DIAGNOSIS — I25.9 ISCHEMIC HEART DISEASE: ICD-10-CM

## 2018-06-06 DIAGNOSIS — I25.5 ISCHEMIC CARDIOMYOPATHY: ICD-10-CM

## 2018-06-06 DIAGNOSIS — Z95.810 CARDIAC RESYNCHRONIZATION THERAPY DEFIBRILLATOR (CRT-D) IN PLACE: ICD-10-CM

## 2018-06-06 DIAGNOSIS — I73.9 PERIPHERAL VASCULAR DISEASE (HCC): ICD-10-CM

## 2018-06-06 DIAGNOSIS — N18.30 CHRONIC KIDNEY DISEASE (CKD), STAGE III (MODERATE) (HCC): ICD-10-CM

## 2018-06-06 DIAGNOSIS — I48.0 PAROXYSMAL ATRIAL FIBRILLATION (HCC): Primary | ICD-10-CM

## 2018-06-06 DIAGNOSIS — E78.5 DYSLIPIDEMIA: ICD-10-CM

## 2018-06-06 PROCEDURE — 93000 ELECTROCARDIOGRAM COMPLETE: CPT | Performed by: INTERNAL MEDICINE

## 2018-06-06 PROCEDURE — 99214 OFFICE O/P EST MOD 30 MIN: CPT | Performed by: INTERNAL MEDICINE

## 2018-06-06 NOTE — PROGRESS NOTES
Subjective:     Encounter Date:06/06/2018    Patient ID: Reza Mcclelland is a 63 y.o. single white male, retired [employer] , from [place], [place].       PHYSICIAN: Rolanda Billingsley MD/EMILY Romo  NEUROLOGIST: Jonas Quiroz MD   CARDIOVASCULAR SURGEON: Guillaume Pruitt MD, FACS  INTERVENTIONAL CARDIOLOGIST: Ismael Nguyen MD, Merged with [facility]/ Oliverio Lemus MD, Merged with [facility], [facility]   ELECTROPHYSIOLOGIST: AMANDA  [facility]: DAKOTA Peters  ENDOCRINOLOGIST: unknown    Chief Complaint:   Chief Complaint   Patient presents with   • Coronary Artery Disease   • ischemic heart disease     Problem List:  1. Ischemic heart disease/ischemic cardiomyopathy:  a. Remote non-ST-elevation myocardial infarction with emergent left heart catheterization and successful percutaneous transluminal coronary angioplasty and stenting of the ramus intermedius with a 3.0 x 12 mm Xience drug-eluting stent, 11/19/2008.  b. Left heart catheterization revealing severe stenosis in the LAD of about 80% to 90% in the mid portion with 2.5 x 28 mm, 3.0 x 28 mm, and 3.5 x 18 mm Xience drug-eluting stents in the mid and proximal portion of the LAD, critical left anterior descending stenosis with recommendations to return in 2 weeks for further intervention, 12/04/2008.  c. Left heart catheterization revealing critical right coronary artery stenosis with 3.5 x 28 mm Xience drug-eluting stent deployed in the mid RCA and Xience drug-eluting stent deployed in the proximal RCA, also revealing severe peripheral vascular disease with critical bilateral stenosis of common iliac artery with recommendations for the patient to remain on Plavix and aspirin and further evaluation of iliac stenosis, 12/18/2008.  d. Recurrent CCS class II-III angina with subsequent left heart catheterization and stenting with a 3.5 x 15 mm Xience drug-eluting stent to the proximal LAD and 2.5 x 18 mm Xience drug-eluting  stent into the distal LAD with widely patent stents in the RCA, 90% lesion in the stent of the LAD and widely patent stent of the diagonal branch. LVEF (0.65) with no wall motion abnormalities. Severe peripheral arterial disease with 60% stenosis of the left common iliac artery and 80% to 90% stenosis in the distal common femoral artery with recommendations for abdominal aortogram with right and left lower extremity arteriogram, 01/21/2011.  e. Cardiac catheterization with percutaneous transluminal angioplasty and self-expanding stent placement to the proximal innominate artery, distal embolic protection filter placement in the right carotid artery for cerebral protection during procedure, 10 x 20 mm Xact self-expanding stent deployed to the proximal innominate stenosis, total occlusion of the proximal left internal carotid artery, 11/29/2011.  f. Markedly reduced echocardiographic LVEF (0.25) with left ventricular chamber enlargement and thickened mitral valve leaflets with moderate MR in the absence of pericardial effusion or intracavitary thrombus/mass with markedly abnormal EKG demonstrating LBBB with marked QRS widening, spring 2013.  g. Recurrent progressive chest pain syndrome with severe single-vessel obstructive coronary disease and 100% total occlusion proximal right coronary artery at site of previously placed stent with mild nonobstructive plaque in the left coronary artery system and previously placed stents in the LAD, ramus intermedius artery patent with left ventriculography deferred and moderate bilateral common iliac disease, April 2013, with subsequent bi-ventricular AICD implant ([employer], model #[id number]) by Dr. Kong.  h. Acute non-ST elevation myocardial infarction/left heart catheterization, Dr. Lemus, with Xience drug-eluting stent to the ramus intermedius: Nonobstructive 50% to 60% mid to distal left anterior descending stenosis. Chronic total occlusion of the dominant right  coronary artery served by left-sided collaterals with left ventricular systolic dysfunction. EF 10% to 15%, 08/20/2014.  i. Biventricular ICD interrogation 7/7/17; RA pacing 72%, RV pacing 96% longevity 1.8 years, mode switch less than 1%, longest was 7 hours 5/9/17, 1 noise reversion both channels  j. Residual CCS class I angina pectoris/NYHA class II-III exertional dyspnea and fatigue.   k. Recent apparent acute congestive heart failure with non-STEMI and possible bronchopneumonia with respiratory failure requiring intubation and apparent diagnostic coronary angiography with 3 stents placed with subsequent apparent AMA discharge - data deficit, [facility], [place], [place], November 2017.  l. Residual CCS class I angina pectoris/NYHA class III-IV biventricular CHF, December 2017.  m. Residual class I symptoms, June 2018  2. Peripheral vascular disease:  a. Cardiac catheterization revealing total occlusion of left internal carotid artery previously in 2011.  b. CT angiogram of the neck showing total occlusion of the left internal carotid artery, 50% to 60% stenosis of the right carotid bulb, stent in the brachiocephalic artery placed by Dr. Santamaria in November 2011, totally occluded, March 2013.  c. Occluded left carotid artery with occluded innominate artery and intracranial circulation via thyroid artery collaterals and left vertebral with left axillary to right axillary bypass graft using 8 mm. PTFE ringed Propaten graft with arteriogram to the right subclavian artery and right axillary artery and right carotid artery, April 2013.  d. Remote hospitalization, [facility], with congestive heart failure and stroke - data deficit (December 2017), with apparent acute injury noted.  3. Cerebrovascular accident and initiation of warfarin therapy, March 2013.  4. Atrial fibrillation on presentation to outside hospital converted to sinus with Cardizem drip and subsequent  recurrence of atrial fibrillation while in the ICU, treated with amiodarone and returned to sinus, August 2014.  5. Dyslipidemia.  6. Stage 3 chronic kidney disease.   7. Left bundle branch block, probably combined ischemic/nonischemic cardiomyopathy.  8. Remote acute-on-chronic respiratory failure with hospital admission for pneumonia on ventilator.  9. History of hypertension.  10. Tobacco abuse, previously resolved but recurrent, with cessation using Chantix, winter of 2015.  11. No prior additional surgical intervention.   12. Progressive erectile dysfunction.  13. Serial acceptable AICD interrogations: February 2014; August 2014; March 2015, July 2017, with home Merlin monitoring.  14. Hyperthyroidism with abnormal thyroid ultrasound documenting thyroid nodule-data deficit.  2017      No Known Allergies      Current Outpatient Prescriptions:   •  albuterol (PROVENTIL) (5 MG/ML) 0.5% nebulizer solution, Take 0.5 mL by nebulization Every 6 (Six) Hours As Needed for Wheezing., Disp: 1 mL, Rfl: 12  •  amiodarone (PACERONE) 200 MG tablet, Take 1 tablet by mouth Daily., Disp: 90 tablet, Rfl: 2  •  apixaban (ELIQUIS) 2.5 MG tablet tablet, Take 2.5 mg by mouth 2 (Two) Times a Day., Disp: , Rfl:   •  atorvastatin (LIPITOR) 80 MG tablet, take 1 tablet by mouth at bedtime, Disp: 30 tablet, Rfl: 5  •  budesonide-formoterol (SYMBICORT) 160-4.5 MCG/ACT inhaler, Inhale 2 puffs 2 (Two) Times a Day., Disp: 1 inhaler, Rfl: 12  •  carvedilol (COREG) 6.25 MG tablet, take 1 tablet by mouth twice a day with food, Disp: 60 tablet, Rfl: 9  •  nitroglycerin (NITROSTAT) 0.4 MG SL tablet, 1 under the tongue as needed for angina, may repeat q5mins for up three doses, Disp: 100 tablet, Rfl: 11  •  omeprazole (priLOSEC) 40 MG capsule, take 1 capsule by mouth once daily, Disp: 30 capsule, Rfl: 5  •  potassium chloride (K-DUR,KLOR-CON) 20 MEQ CR tablet, take 1 tablet by mouth once daily (Patient taking differently: every other day), Disp: 30  "tablet, Rfl: 5  •  spironolactone (ALDACTONE) 25 MG tablet, take 1 tablet by mouth once daily, Disp: 90 tablet, Rfl: 1  •  ticagrelor (BRILINTA) 90 MG tablet tablet, Take 1 tablet by mouth 2 (Two) Times a Day., Disp: 60 tablet, Rfl: 6  •  torsemide (DEMADEX) 10 MG tablet, Take 1 tablet by mouth As Needed (weight gain, dyspnea, edema)., Disp: 15 tablet, Rfl: 5  •  VENTOLIN  (90 BASE) MCG/ACT inhaler, Inhale 2 puffs Every 6 (Six) Hours As Needed., Disp: , Rfl: 0    HISTORY OF PRESENT ILLNESS: Patient returns for scheduled followup after a 4-month hiatus. He says that he has no appetite and is only eating supper every day.  He says he has never tried using Boost or Ensure and is asked if he would consider trying that.  He notes that his appetite has not been good since the last time he was admitted to the hospital.  He says that he has never gone to cardiac rehab, but he goes to the gym to walk and to lift weights.  He does not complain of chest pain and has never had to use nitroglycerin; however, he has it available to use if he needs it.  He has a spot on his right finger that is bleeding, and he says that \"if I bump into anything, I bleed.\"  He is no longer working as a  but does his \"own personal stuff.\"  Patient otherwise denies chest pain, shortness of breath, PND, edema, palpitations, syncope or presyncope at this time.        Review of Systems   Constitution: Positive for decreased appetite.   Respiratory: Positive for sputum production.    Hematologic/Lymphatic: Bruises/bleeds easily.   Skin: Positive for dry skin and poor wound healing.      Obtained and otherwise negative except as outlined in problem list and HPI.      ECG 12 Lead  Date/Time: 6/6/2018 12:13 PM  Performed by: KIMI OLGUIN  Authorized by: KIMI OLGUIN   Comparison: compared with previous ECG from 1/3/2018  Similar to previous ECG  Rhythm: paced  BPM: 70  Clinical impression: abnormal ECG               Objective:     " "  Vitals:    06/06/18 0945 06/06/18 0952   BP: 98/69 91/62   BP Location: Left arm Left arm   Patient Position: Sitting Standing   Pulse: 70 70   Weight: 76.2 kg (168 lb) 76.2 kg (168 lb)   Height: 175.3 cm (69\") 175.3 cm (69\")     Body mass index is 24.81 kg/m².   Last weight:  192 lbs.    Physical Exam   Constitutional: He is oriented to person, place, and time. He appears well-developed and well-nourished.   Neck: No JVD present. Carotid bruit is not present. No thyromegaly present.   Cardiovascular: Regular rhythm, S1 normal and S2 normal.  Exam reveals distant heart sounds. Exam reveals no gallop, no S3 and no friction rub.    Murmur heard.   Medium-pitched early systolic murmur is present with a grade of 2/6  at the lower left sternal border  Pulses:       Carotid pulses are 1+ on the right side, and 1+ on the left side.       Radial pulses are 1+ on the right side, and 1+ on the left side.        Femoral pulses are 1+ on the right side, and 1+ on the left side.       Popliteal pulses are 1+ on the right side, and 1+ on the left side.        Dorsalis pedis pulses are 1+ on the right side, and 1+ on the left side.        Posterior tibial pulses are 1+ on the right side, and 1+ on the left side.   Pulmonary/Chest: Effort normal. He has decreased breath sounds. He has no wheezes. He has no rhonchi. He has no rales.   Left precordial PCD site is nominal   Abdominal: Soft. He exhibits no mass. There is no hepatosplenomegaly. There is no tenderness. There is no guarding.   Bowel sounds audible x4   Musculoskeletal: Normal range of motion. He exhibits no edema.   Lymphadenopathy:     He has no cervical adenopathy.   Neurological: He is alert and oriented to person, place, and time.   Skin: Skin is warm, dry and intact. No rash noted.   Vitals reviewed.        Lab Review:   Lab Results   Component Value Date    GLUCOSE 102 (H) 05/16/2018    BUN 18 05/16/2018    CREATININE 1.60 (H) 05/16/2018    EGFRIFNONA 44 (L) " 05/16/2018    BCR 11.3 05/16/2018    CO2 27.0 05/16/2018    CALCIUM 9.2 05/16/2018    ALBUMIN 4.70 05/02/2018    LABIL2 2.0 05/02/2018    AST 12 05/02/2018    ALT 7 05/02/2018 05/16/2018:  Sodium - 136  Potassium - 4.4  Chloride - 104    Lab Results   Component Value Date    WBC 10.67 05/02/2018    HGB 15.3 05/02/2018    HCT 45.5 05/02/2018    MCV 91.0 05/02/2018     05/02/2018       Lab Results   Component Value Date    HGBA1C 5.60 05/02/2018       Lab Results   Component Value Date    TSH 3.350 05/02/2018   Free T4 - 1.47 (05/02/2018)    Lab Results   Component Value Date    CHOL 101 05/02/2018    CHOL 129 12/16/2017     Lab Results   Component Value Date    TRIG 74 05/02/2018    TRIG 163 (H) 12/16/2017     Lab Results   Component Value Date    HDL 38 (L) 05/02/2018    HDL 39 (L) 12/16/2017     Lab Results   Component Value Date    LDL 53 05/02/2018    LDL 79 12/16/2017       Lab Results   Component Value Date    .0 (H) 12/15/2017           Assessment:   Overall continued acceptable course with no interim cardiopulmonary complaints with acceptable functional status. We will defer additional diagnostic or therapeutic intervention from a cardiac perspective at this time.  I am perplexed by his weight loss, although he is now normal weight.  I have encouraged him to ensure adequate caloric intake.  No current angina pectoris or CHF symptoms at this time on somewhat limited activity.         Diagnosis Plan   1. Paroxysmal atrial fibrillation  Ambulatory Referral to Cardiac Electrophysiology   2. Cardiac resynchronization therapy defibrillator (CRT-D) in place  Ambulatory Referral to Cardiac Electrophysiology   3. Ischemic heart disease  No recurrent angina pectoris or CHF.     4. Ischemic cardiomyopathy  Continue current treatment   5. Peripheral vascular disease  Continue current treatment and avoid tobacco products   6. Chronic kidney disease (CKD), stage III (moderate)  No data to review   7.  Dyslipidemia  No data to review          Plan:         1. Patient to continue current medications and close follow up with the above providers.  2. Referral to electrophysiologist for followup of AICD, as well as history of atrial fibrillation.  3. Tentative cardiology follow up in October 2018 with CMP/FLP if not performed in the interim, or patient may return sooner PRN.    Transcribed by Jovanna Lo for Dr. Andrea Guevara at 9:59 AM on 06/06/2018    I, Andrea Guevara MD, [facility], personally performed the services described in this documentation as scribed by the above named individual in my presence, and it is both accurate and complete. At 12:10 PM on 06/06/2018

## 2018-06-11 RX ORDER — AMIODARONE HYDROCHLORIDE 200 MG/1
TABLET ORAL
Qty: 90 TABLET | Refills: 2 | Status: SHIPPED | OUTPATIENT
Start: 2018-06-11 | End: 2019-02-27 | Stop reason: SDUPTHER

## 2018-06-25 RX ORDER — TORSEMIDE 10 MG/1
10 TABLET ORAL DAILY PRN
Qty: 30 TABLET | Refills: 5 | Status: SHIPPED | OUTPATIENT
Start: 2018-06-25 | End: 2019-02-12

## 2018-06-27 ENCOUNTER — TELEPHONE (OUTPATIENT)
Dept: CARDIOLOGY | Facility: CLINIC | Age: 63
End: 2018-06-27

## 2018-06-27 NOTE — TELEPHONE ENCOUNTER
Called pt due to Merlin home monitor not connecting.  Pt said it is unplugged at this time but he will plug it up.  Pt has f/u with Moses on 6/29/18.  Pt was unaware.  He can't make it.  Sent message to  to reschedule.

## 2018-07-10 RX ORDER — SPIRONOLACTONE 25 MG/1
TABLET ORAL
Qty: 90 TABLET | Refills: 2 | Status: SHIPPED | OUTPATIENT
Start: 2018-07-10 | End: 2019-03-12

## 2018-07-25 RX ORDER — ATORVASTATIN CALCIUM 80 MG/1
TABLET, FILM COATED ORAL
Qty: 30 TABLET | Refills: 11 | Status: SHIPPED | OUTPATIENT
Start: 2018-07-25 | End: 2019-03-12

## 2018-08-06 NOTE — PROGRESS NOTES
Reza Mcclelland  1955  PCP: Pearl Sabillon PA    SUBJECTIVE:   Reza Mcclelland is a 63 y.o. male seen for a consultation visit regarding the following:     Chief Complaint:   Chief Complaint   Patient presents with   • Atrial Fibrillation          Consultation is requested by Andrea Guevara MD for evaluation of Atrial Fibrillation        History:  This is a 63-year-old patient followed by Dr. Andrea Guevara.  He has a history of extensive coronary artery disease and chronic systolic heart failure.  He has a previously implanted biventricular ICD.  He continues to have class II to class III angina symptoms. He has quit smoking      Cardiac PMH: (Old records have been reviewed and summarized below)  1. Ischemic heart disease/ischemic cardiomyopathy:  a. Remote non-ST-elevation myocardial infarction with emergent left heart catheterization and successful percutaneous transluminal coronary angioplasty and stenting of the ramus intermedius with a 3.0 x 12 mm Xience drug-eluting stent, 11/19/2008.  b. Left heart catheterization revealing severe stenosis in the LAD of about 80% to 90% in the mid portion with 2.5 x 28 mm, 3.0 x 28 mm, and 3.5 x 18 mm Xience drug-eluting stents in the mid and proximal portion of the LAD, critical left anterior descending stenosis with recommendations to return in 2 weeks for further intervention, 12/04/2008.  c. Left heart catheterization revealing critical right coronary artery stenosis with 3.5 x 28 mm Xience drug-eluting stent deployed in the mid RCA and Xience drug-eluting stent deployed in the proximal RCA, also revealing severe peripheral vascular disease with critical bilateral stenosis of common iliac artery with recommendations for the patient to remain on Plavix and aspirin and further evaluation of iliac stenosis, 12/18/2008.  d. Recurrent CCS class II-III angina with subsequent left heart catheterization and stenting with a 3.5 x 15 mm Xience drug-eluting stent to the  proximal LAD and 2.5 x 18 mm Xience drug-eluting stent into the distal LAD with widely patent stents in the RCA, 90% lesion in the stent of the LAD and widely patent stent of the diagonal branch. LVEF (0.65) with no wall motion abnormalities. Severe peripheral arterial disease with 60% stenosis of the left common iliac artery and 80% to 90% stenosis in the distal common femoral artery with recommendations for abdominal aortogram with right and left lower extremity arteriogram, 01/21/2011.  e. Cardiac catheterization with percutaneous transluminal angioplasty and self-expanding stent placement to the proximal innominate artery, distal embolic protection filter placement in the right carotid artery for cerebral protection during procedure, 10 x 20 mm Xact self-expanding stent deployed to the proximal innominate stenosis, total occlusion of the proximal left internal carotid artery, 11/29/2011.  f. Markedly reduced echocardiographic LVEF (0.25) with left ventricular chamber enlargement and thickened mitral valve leaflets with moderate MR in the absence of pericardial effusion or intracavitary thrombus/mass with markedly abnormal EKG demonstrating LBBB with marked QRS widening, spring 2013.  g. Recurrent progressive chest pain syndrome with severe single-vessel obstructive coronary disease and 100% total occlusion proximal right coronary artery at site of previously placed stent with mild nonobstructive plaque in the left coronary artery system and previously placed stents in the LAD, ramus intermedius artery patent with left ventriculography deferred and moderate bilateral common iliac disease, April 2013, with subsequent bi-ventricular AICD implant ([employer], model #[id number]) by Dr. Kong.  h. Acute non-ST elevation myocardial infarction/left heart catheterization, Dr. Lemus, with Xience drug-eluting stent to the ramus intermedius: Nonobstructive 50% to 60% mid to distal left anterior descending stenosis.  Chronic total occlusion of the dominant right coronary artery served by left-sided collaterals with left ventricular systolic dysfunction. EF 10% to 15%, 08/20/2014.  i. Biventricular ICD interrogation 7/7/17; RA pacing 72%, RV pacing 96% longevity 1.8 years, mode switch less than 1%, longest was 7 hours 5/9/17, 1 noise reversion both channels  j. Residual CCS class I angina pectoris/NYHA class II-III exertional dyspnea and fatigue.   k. Recent apparent acute congestive heart failure with non-STEMI and possible bronchopneumonia with respiratory failure requiring intubation and apparent diagnostic coronary angiography with 3 stents placed with subsequent apparent AMA discharge - data deficit, [facility], [place], [place], November 2017.  l. Residual CCS class I angina pectoris/NYHA class III-IV biventricular CHF, December 2017.  m. Residual class I symptoms, June 2018  2. Peripheral vascular disease:  a. Cardiac catheterization revealing total occlusion of left internal carotid artery previously in 2011.  b. CT angiogram of the neck showing total occlusion of the left internal carotid artery, 50% to 60% stenosis of the right carotid bulb, stent in the brachiocephalic artery placed by Dr. Santamaria in November 2011, totally occluded, March 2013.  c. Occluded left carotid artery with occluded innominate artery and intracranial circulation via thyroid artery collaterals and left vertebral with left axillary to right axillary bypass graft using 8 mm. PTFE ringed Propaten graft with arteriogram to the right subclavian artery and right axillary artery and right carotid artery, April 2013.  d. Remote hospitalization, [facility] [place], with congestive heart failure and stroke - data deficit (December 2017), with apparent acute injury noted.  3. Cerebrovascular accident and initiation of warfarin therapy, March 2013.  4. Atrial fibrillation on presentation to outside hospital converted  to sinus with Cardizem drip and subsequent recurrence of atrial fibrillation while in the ICU, treated with amiodarone and returned to sinus, August 2014.  5. Dyslipidemia.  6. Stage 3 chronic kidney disease.   7. Left bundle branch block, probably combined ischemic/nonischemic cardiomyopathy.  8. Remote acute-on-chronic respiratory failure with hospital admission for pneumonia on ventilator.  9. History of hypertension.  10. Tobacco abuse, previously resolved but recurrent, with cessation using Chantix, winter of 2015.  11. No prior additional surgical intervention.   12. Progressive erectile dysfunction.  13. Serial acceptable AICD interrogations: February 2014; August 2014; March 2015, July 2017, with home [facility] monitoring.  14. Hyperthyroidism with abnormal thyroid ultrasound documenting thyroid nodule-data deficit.  2017    Past Medical History, Past Surgical History, Family history, Social History, and Medications were all reviewed with the patient today and updated as necessary.       Current Outpatient Prescriptions:   •  albuterol (PROVENTIL) (5 MG/ML) 0.5% nebulizer solution, Take 0.5 mL by nebulization Every 6 (Six) Hours As Needed for Wheezing., Disp: 1 mL, Rfl: 12  •  amiodarone (PACERONE) 200 MG tablet, take 1 tablet by mouth once daily, Disp: 90 tablet, Rfl: 2  •  apixaban (ELIQUIS) 2.5 MG tablet tablet, Take 2.5 mg by mouth Daily., Disp: , Rfl:   •  atorvastatin (LIPITOR) 80 MG tablet, take 1 tablet by mouth at bedtime, Disp: 30 tablet, Rfl: 11  •  budesonide-formoterol (SYMBICORT) 160-4.5 MCG/ACT inhaler, Inhale 2 puffs 2 (Two) Times a Day., Disp: 1 inhaler, Rfl: 12  •  carvedilol (COREG) 6.25 MG tablet, take 1 tablet by mouth twice a day with food, Disp: 60 tablet, Rfl: 9  •  nitroglycerin (NITROSTAT) 0.4 MG SL tablet, 1 under the tongue as needed for angina, may repeat q5mins for up three doses, Disp: 100 tablet, Rfl: 11  •  omeprazole (priLOSEC) 40 MG capsule, take 1 capsule by mouth once daily,  Disp: 30 capsule, Rfl: 5  •  potassium chloride (K-DUR,KLOR-CON) 20 MEQ CR tablet, take 1 tablet by mouth once daily (Patient taking differently: every other day), Disp: 30 tablet, Rfl: 5  •  spironolactone (ALDACTONE) 25 MG tablet, take 1 tablet by mouth once daily, Disp: 90 tablet, Rfl: 2  •  ticagrelor (BRILINTA) 90 MG tablet tablet, Take 1 tablet by mouth 2 (Two) Times a Day., Disp: 60 tablet, Rfl: 6  •  torsemide (DEMADEX) 10 MG tablet, Take 1 tablet by mouth Daily As Needed (edema)., Disp: 30 tablet, Rfl: 5  •  VENTOLIN  (90 BASE) MCG/ACT inhaler, Inhale 2 puffs Every 6 (Six) Hours As Needed., Disp: , Rfl: 0    No Known Allergies      Past Medical History:   Diagnosis Date   • Acute on chronic respiratory failure (CMS/HCC)     Recent acute-on-chronic respiratory failure with hospital admission for pneumonia on ventilator.   • AICD (automatic cardioverter/defibrillator) present    • Atrial fibrillation (CMS/HCC)     Atrial fibrillation on presentation to outside hospital converted to sinus with Cardizem drip and subsequent recurrence of atrial fibrillation while in the ICU, treated with amiodarone and returned to sinus, August 2014.   • CAD (coronary artery disease)    • Cerebrovascular accident (CMS/HCC)     Cerebrovascular accident and initiation of warfarin therapy, March 2013.   • Chronic kidney disease (CKD), stage III (moderate)    • Dyslipidemia    • History of hypertension    • Ischemic cardiomyopathy    • Ischemic heart disease    • Left bundle branch block     Left bundle branch block, probably combined ischemic/nonischemic cardiomyopathy.   • Peripheral vascular disease (CMS/HCC)      Past Surgical History:   Procedure Laterality Date   • AXILLARY AXILLARY BYPASS     • CAROTID STENT       Family History   Problem Relation Age of Onset   • Dementia Mother    • Prostate cancer Father    • Heart disease Father    • Cancer Brother         kidney   • No Known Problems Sister    • Heart failure  "Maternal Grandmother    • Heart disease Maternal Grandmother    • Heart attack Maternal Grandmother    • Heart disease Maternal Grandfather    • Heart attack Maternal Grandfather    • No Known Problems Paternal Grandmother    • Heart attack Paternal Grandfather    • No Known Problems Sister    • Heart attack Paternal Uncle    • Heart disease Paternal Uncle      Social History   Substance Use Topics   • Smoking status: Former Smoker     Packs/day: 1.00     Years: 48.00     Types: Cigarettes     Quit date: 11/27/2017   • Smokeless tobacco: Never Used      Comment: Pt quit smoking 6-7 months ago   • Alcohol use No       ROS:  Review of Systems:  General: Stable wt gain and fatigue  Skin: no rashes, lumps, or other skin changes  HEENT: no dizziness, lightheadedness, or vision changes  Respiratory: no cough or hemoptysis  Cardiovascular: Occasional SOB/ALTAMIRANO, and swelling  Gastrointestinal: no black/tarry stools or diarrhea  Urinary: no change in frequency or urgency  Peripheral Vascular: no claudication or leg cramps  Musculoskeletal: no muscle or joint pain/stiffness  Psychiatric: no depression or excessive stress  Neurological: no sensory or motor loss, no syncope  Hematologic: no anemia, easy bruising or bleeding  Endocrine: no thyroid problems, nor heat or cold intolerance       PHYSICAL EXAM:   /64 (BP Location: Left arm, Patient Position: Sitting)   Pulse 70   Ht 175.3 cm (69\")   Wt 74.6 kg (164 lb 6.4 oz)   BMI 24.28 kg/m²      Wt Readings from Last 5 Encounters:   08/07/18 74.6 kg (164 lb 6.4 oz)   06/06/18 76.2 kg (168 lb)   05/02/18 78.8 kg (173 lb 12.8 oz)   01/31/18 87.1 kg (192 lb)   01/03/18 88.7 kg (195 lb 9.6 oz)     BP Readings from Last 5 Encounters:   08/07/18 100/64   06/06/18 91/62   05/02/18 97/71   01/31/18 91/73   01/03/18 100/66       General-Well Nourished, Well developed  Eyes - PERRLA  Neck- supple, No mass  CV- regular rate and rhythm, no MRG  Lung- clear bilaterally  Abd- soft, " +BS  Musc/skel - Norm strength and range of motion  Skin- warm and dry  Neuro - Alert & Oriented x 3, appropriate mood.    Medical problems and test results were reviewed with the patient today.     Results for orders placed or performed in visit on 05/16/18   Basic Metabolic Panel   Result Value Ref Range    Glucose 102 (H) 70 - 100 mg/dL    BUN 18 9 - 23 mg/dL    Creatinine 1.60 (H) 0.60 - 1.30 mg/dL    Sodium 136 132 - 146 mmol/L    Potassium 4.4 3.5 - 5.5 mmol/L    Chloride 104 99 - 109 mmol/L    CO2 27.0 20.0 - 31.0 mmol/L    Calcium 9.2 8.7 - 10.4 mg/dL    eGFR Non African Amer 44 (L) >60 mL/min/1.73    BUN/Creatinine Ratio 11.3 7.0 - 25.0    Anion Gap 5.0 3.0 - 11.0 mmol/L         Lab Results   Component Value Date    CHOL 101 05/02/2018    HDL 38 (L) 05/02/2018    LDL 53 05/02/2018       EKG:  (EKG/Tracing has been independently visualized by me and summarized below)      ECG 12 Lead  Date/Time: 8/7/2018 11:40 AM  Performed by: REZA LOPES  Authorized by: REZA LOPES   Previous ECG: no previous ECG available  Rhythm: sinus rhythm and paced  Rate: normal  BPM: 70  Clinical impression: abnormal ECG            ASSESSMENT and PLAN  1.  Chronic systolic heart failure-stable overall. Followed by Dr. Guevara  2.  Biventricular ICD-we'll monitor in device clinic. Has about 7 months left before DARIEN. EKG vectors suggest Anterior wall pacing.   3.  Atrial Fibrillation - Current burden is 0% - If bleeding remains an issues will plan to stop Eliquis . Currently is taking 2.5mg BID    Return in about 6 months (around 2/7/2019) for office visit and device check with [facility].            Reza Lopes M.D., F.A.C.C, F.H.R.S.  Cardiology/Electrophysiology  08/07/18  11:41 AM

## 2018-08-07 ENCOUNTER — CONSULT (OUTPATIENT)
Dept: CARDIOLOGY | Facility: CLINIC | Age: 63
End: 2018-08-07

## 2018-08-07 VITALS
WEIGHT: 164.4 LBS | DIASTOLIC BLOOD PRESSURE: 64 MMHG | SYSTOLIC BLOOD PRESSURE: 100 MMHG | BODY MASS INDEX: 24.35 KG/M2 | HEIGHT: 69 IN | HEART RATE: 70 BPM

## 2018-08-07 DIAGNOSIS — Z95.810 CARDIAC RESYNCHRONIZATION THERAPY DEFIBRILLATOR (CRT-D) IN PLACE: Primary | ICD-10-CM

## 2018-08-07 DIAGNOSIS — I50.22 CHRONIC SYSTOLIC CONGESTIVE HEART FAILURE (HCC): ICD-10-CM

## 2018-08-07 DIAGNOSIS — I48.0 PAROXYSMAL ATRIAL FIBRILLATION (HCC): ICD-10-CM

## 2018-08-07 PROCEDURE — 93000 ELECTROCARDIOGRAM COMPLETE: CPT | Performed by: INTERNAL MEDICINE

## 2018-08-07 PROCEDURE — 99204 OFFICE O/P NEW MOD 45 MIN: CPT | Performed by: INTERNAL MEDICINE

## 2018-09-07 ENCOUNTER — TELEPHONE (OUTPATIENT)
Dept: CARDIOLOGY | Facility: CLINIC | Age: 63
End: 2018-09-07

## 2018-10-10 ENCOUNTER — LAB (OUTPATIENT)
Dept: LAB | Facility: HOSPITAL | Age: 63
End: 2018-10-10

## 2018-10-10 ENCOUNTER — OFFICE VISIT (OUTPATIENT)
Dept: CARDIOLOGY | Facility: CLINIC | Age: 63
End: 2018-10-10

## 2018-10-10 VITALS
SYSTOLIC BLOOD PRESSURE: 105 MMHG | HEART RATE: 71 BPM | HEIGHT: 70 IN | WEIGHT: 166.2 LBS | DIASTOLIC BLOOD PRESSURE: 68 MMHG | BODY MASS INDEX: 23.79 KG/M2

## 2018-10-10 DIAGNOSIS — I25.9 ISCHEMIC HEART DISEASE: ICD-10-CM

## 2018-10-10 DIAGNOSIS — E78.5 DYSLIPIDEMIA: ICD-10-CM

## 2018-10-10 DIAGNOSIS — I50.23 ACUTE ON CHRONIC SYSTOLIC CONGESTIVE HEART FAILURE (HCC): ICD-10-CM

## 2018-10-10 DIAGNOSIS — I25.5 ISCHEMIC CARDIOMYOPATHY: ICD-10-CM

## 2018-10-10 DIAGNOSIS — E78.5 HYPERLIPIDEMIA LDL GOAL <70: Primary | ICD-10-CM

## 2018-10-10 DIAGNOSIS — N18.30 CHRONIC KIDNEY DISEASE (CKD), STAGE III (MODERATE) (HCC): ICD-10-CM

## 2018-10-10 DIAGNOSIS — E78.5 HYPERLIPIDEMIA LDL GOAL <70: ICD-10-CM

## 2018-10-10 LAB
ALBUMIN SERPL-MCNC: 4.36 G/DL (ref 3.2–4.8)
ALBUMIN/GLOB SERPL: 2 G/DL (ref 1.5–2.5)
ALP SERPL-CCNC: 73 U/L (ref 25–100)
ALT SERPL W P-5'-P-CCNC: 10 U/L (ref 7–40)
ANION GAP SERPL CALCULATED.3IONS-SCNC: 4 MMOL/L (ref 3–11)
ARTICHOKE IGE QN: 102 MG/DL (ref 0–130)
AST SERPL-CCNC: 13 U/L (ref 0–33)
BILIRUB SERPL-MCNC: 1 MG/DL (ref 0.3–1.2)
BNP SERPL-MCNC: 839 PG/ML (ref 0–100)
BUN BLD-MCNC: 20 MG/DL (ref 9–23)
BUN/CREAT SERPL: 11.9 (ref 7–25)
CALCIUM SPEC-SCNC: 9 MG/DL (ref 8.7–10.4)
CHLORIDE SERPL-SCNC: 105 MMOL/L (ref 99–109)
CHOLEST SERPL-MCNC: 141 MG/DL (ref 0–200)
CO2 SERPL-SCNC: 26 MMOL/L (ref 20–31)
CREAT BLD-MCNC: 1.68 MG/DL (ref 0.6–1.3)
GFR SERPL CREATININE-BSD FRML MDRD: 41 ML/MIN/1.73
GLOBULIN UR ELPH-MCNC: 2.1 GM/DL
GLUCOSE BLD-MCNC: 96 MG/DL (ref 70–100)
HDLC SERPL-MCNC: 37 MG/DL (ref 40–60)
MAGNESIUM SERPL-MCNC: 2.1 MG/DL (ref 1.3–2.7)
POTASSIUM BLD-SCNC: 4.3 MMOL/L (ref 3.5–5.5)
PROT SERPL-MCNC: 6.5 G/DL (ref 5.7–8.2)
SODIUM BLD-SCNC: 135 MMOL/L (ref 132–146)
TRIGL SERPL-MCNC: 70 MG/DL (ref 0–150)

## 2018-10-10 PROCEDURE — 80061 LIPID PANEL: CPT

## 2018-10-10 PROCEDURE — 80053 COMPREHEN METABOLIC PANEL: CPT

## 2018-10-10 PROCEDURE — 83735 ASSAY OF MAGNESIUM: CPT

## 2018-10-10 PROCEDURE — 99214 OFFICE O/P EST MOD 30 MIN: CPT | Performed by: INTERNAL MEDICINE

## 2018-10-10 PROCEDURE — 36415 COLL VENOUS BLD VENIPUNCTURE: CPT

## 2018-10-10 PROCEDURE — 83880 ASSAY OF NATRIURETIC PEPTIDE: CPT

## 2018-10-10 NOTE — PROGRESS NOTES
Subjective:     Encounter Date:10/10/2018    Patient ID: Reza Mcclelland is a 63 y.o. single white male, retired [employer] , from [place], [place].       PHYSICIAN: Rolanda Billingsley MD/EMILY Romo  NEUROLOGIST: Jonas Quiroz MD   CARDIOVASCULAR SURGEON: Guillaume Pruitt MD, [facility]  INTERVENTIONAL CARDIOLOGIST: Ismael Nguyen MD, [facility]/ Oliverio Lemus MD, [facility], [facility]   ELECTROPHYSIOLOGIST: Reza Lopes MD, [facility], [facility]: DAKOTA Peters  ENDOCRINOLOGIST: unknown    Chief Complaint:   Chief Complaint   Patient presents with   • Atrial Fibrillation   • Coronary Artery Disease   • Ischemic heart disease     Problem List:  1. Ischemic heart disease/ischemic cardiomyopathy:  a. Remote non-ST-elevation myocardial infarction with emergent left heart catheterization and successful percutaneous transluminal coronary angioplasty and stenting of the ramus intermedius with a 3.0 x 12 mm Xience drug-eluting stent, 11/19/2008.  b. Left heart catheterization revealing severe stenosis in the LAD of about 80% to 90% in the mid portion with 2.5 x 28 mm, 3.0 x 28 mm, and 3.5 x 18 mm Xience drug-eluting stents in the mid and proximal portion of the LAD, critical left anterior descending stenosis with recommendations to return in 2 weeks for further intervention, 12/04/2008.  c. Left heart catheterization revealing critical right coronary artery stenosis with 3.5 x 28 mm Xience drug-eluting stent deployed in the mid RCA and Xience drug-eluting stent deployed in the proximal RCA, also revealing severe peripheral vascular disease with critical bilateral stenosis of common iliac artery with recommendations for the patient to remain on Plavix and aspirin and further evaluation of iliac stenosis, 12/18/2008.  d. Recurrent CCS class II-III angina with subsequent left heart catheterization and stenting with a 3.5 x 15 mm Xience drug-eluting stent to  the proximal LAD and 2.5 x 18 mm Xience drug-eluting stent into the distal LAD with widely patent stents in the RCA, 90% lesion in the stent of the LAD and widely patent stent of the diagonal branch. LVEF (0.65) with no wall motion abnormalities. Severe peripheral arterial disease with 60% stenosis of the left common iliac artery and 80% to 90% stenosis in the distal common femoral artery with recommendations for abdominal aortogram with right and left lower extremity arteriogram, 01/21/2011.  e. Cardiac catheterization with percutaneous transluminal angioplasty and self-expanding stent placement to the proximal innominate artery, distal embolic protection filter placement in the right carotid artery for cerebral protection during procedure, 10 x 20 mm Xact self-expanding stent deployed to the proximal innominate stenosis, total occlusion of the proximal left internal carotid artery, 11/29/2011.  f. Markedly reduced echocardiographic LVEF (0.25) with left ventricular chamber enlargement and thickened mitral valve leaflets with moderate MR in the absence of pericardial effusion or intracavitary thrombus/mass with markedly abnormal EKG demonstrating LBBB with marked QRS widening, spring 2013.  g. Recurrent progressive chest pain syndrome with severe single-vessel obstructive coronary disease and 100% total occlusion proximal right coronary artery at site of previously placed stent with mild nonobstructive plaque in the left coronary artery system and previously placed stents in the LAD, ramus intermedius artery patent with left ventriculography deferred and moderate bilateral common iliac disease, April 2013, with subsequent bi-ventricular AICD implant ([employer], model #[id number]) by Dr. Kong.  h. Acute non-ST elevation myocardial infarction/left heart catheterization, Dr. Lemus, with Xience drug-eluting stent to the ramus intermedius: Nonobstructive 50% to 60% mid to distal left anterior descending  stenosis. Chronic total occlusion of the dominant right coronary artery served by left-sided collaterals with left ventricular systolic dysfunction. EF 10% to 15%, 08/20/2014.  i. Biventricular ICD interrogation 7/7/17; RA pacing 72%, RV pacing 96% longevity 1.8 years, mode switch less than 1%, longest was 7 hours 5/9/17, 1 noise reversion both channels  j. Residual CCS class I angina pectoris/NYHA class II-III exertional dyspnea and fatigue.   k. Remote apparent acute congestive heart failure with non-STEMI and possible bronchopneumonia with respiratory failure requiring intubation and apparent diagnostic coronary angiography with 3 stents placed with subsequent apparent AMA discharge - data deficit, [facility], [place], [place], November 2017.  l. Residual CCS class I angina pectoris/NYHA class III-IV biventricular CHF, December 2017.  m. Residual class I symptoms, June 2018, October 2018  2. Peripheral vascular disease:  a. Cardiac catheterization revealing total occlusion of left internal carotid artery previously in 2011.  b. CT angiogram of the neck showing total occlusion of the left internal carotid artery, 50% to 60% stenosis of the right carotid bulb, stent in the brachiocephalic artery placed by Dr. Santamaria in November 2011, totally occluded, March 2013.  c. Occluded left carotid artery with occluded innominate artery and intracranial circulation via thyroid artery collaterals and left vertebral with left axillary to right axillary bypass graft using 8 mm. PTFE ringed Propaten graft with arteriogram to the right subclavian artery and right axillary artery and right carotid artery, April 2013.  d. Remote hospitalization, [facility], with congestive heart failure and stroke - data deficit (December 2017), with apparent acute injury noted.  3. Cerebrovascular accident and initiation of warfarin therapy, March 2013.  4. Atrial fibrillation on presentation to  outside hospital converted to sinus with Cardizem drip and subsequent recurrence of atrial fibrillation while in the ICU, treated with amiodarone and returned to sinus, August 2014.  5. Dyslipidemia.  6. Stage 3 chronic kidney disease.   7. Left bundle branch block, probably combined ischemic/nonischemic cardiomyopathy.  8. Remote acute-on-chronic respiratory failure with hospital admission for pneumonia on ventilator.  9. History of hypertension.  10. Tobacco abuse, previously resolved but recurrent, with cessation using Chantix, winter of 2015.  11. No prior additional surgical intervention.   12. Progressive erectile dysfunction.  13. Serial acceptable AICD interrogations: February 2014; August 2014; March 2015, July 2017, with home Merlin monitoring.  14. Hyperthyroidism with abnormal thyroid ultrasound documenting thyroid nodule-data deficit.  2017      No Known Allergies      Current Outpatient Prescriptions:   •  albuterol (PROVENTIL) (5 MG/ML) 0.5% nebulizer solution, Take 0.5 mL by nebulization Every 6 (Six) Hours As Needed for Wheezing., Disp: 1 mL, Rfl: 12  •  amiodarone (PACERONE) 200 MG tablet, take 1 tablet by mouth once daily, Disp: 90 tablet, Rfl: 2  •  apixaban (ELIQUIS) 2.5 MG tablet tablet, Take 2.5 mg by mouth 2 (Two) Times a Day., Disp: , Rfl:   •  atorvastatin (LIPITOR) 80 MG tablet, take 1 tablet by mouth at bedtime, Disp: 30 tablet, Rfl: 11  •  budesonide-formoterol (SYMBICORT) 160-4.5 MCG/ACT inhaler, Inhale 2 puffs 2 (Two) Times a Day., Disp: 1 inhaler, Rfl: 12  •  carvedilol (COREG) 6.25 MG tablet, take 1 tablet by mouth twice a day with food, Disp: 60 tablet, Rfl: 9  •  nitroglycerin (NITROSTAT) 0.4 MG SL tablet, 1 under the tongue as needed for angina, may repeat q5mins for up three doses, Disp: 100 tablet, Rfl: 11  •  omeprazole (priLOSEC) 40 MG capsule, take 1 capsule by mouth once daily, Disp: 30 capsule, Rfl: 5  •  potassium chloride (K-DUR,KLOR-CON) 20 MEQ CR tablet, take 1 tablet  "by mouth once daily (Patient taking differently: every other day), Disp: 30 tablet, Rfl: 5  •  spironolactone (ALDACTONE) 25 MG tablet, take 1 tablet by mouth once daily, Disp: 90 tablet, Rfl: 2  •  ticagrelor (BRILINTA) 90 MG tablet tablet, Take 1 tablet by mouth 2 (Two) Times a Day., Disp: 60 tablet, Rfl: 6  •  torsemide (DEMADEX) 10 MG tablet, Take 1 tablet by mouth Daily As Needed (edema)., Disp: 30 tablet, Rfl: 5  •  VENTOLIN  (90 BASE) MCG/ACT inhaler, Inhale 2 puffs Every 6 (Six) Hours As Needed., Disp: , Rfl: 0    HISTORY OF PRESENT ILLNESS: Patient returns for scheduled 4-month followup. He has many bruises up and down his arms, so we will stop his apixaban today and initiate aspirin.  The patient states that he was able to travel to [place] and had a good time; he notes that he had to go through a different check with the [facility] due to his defibrillator.  He continues to not smoke and does not have any chest pain or pressure.  He is up and about on a daily basis \"doing this and that.\"  He is retired from his job but notes he has plenty to do every day.  He has no symptoms from a cardiopulmonary perspective with his daily activities.  He has not had any labs drawn recently and is fasting today; he is agreeable to having some blood drawn today.  He continues to have significant bleeding with minimal trauma, primarily his hands and arms, but he has had no apparent melena, hematuria, or hematochezia.  Patient otherwise denies chest pain, shortness of breath, PND, edema, palpitations, syncope or presyncope at this time on current activity schedule.        Review of Systems   Respiratory: Positive for sputum production.       Obtained and otherwise negative except as outlined in problem list and HPI.    Procedures       Objective:       Vitals:    10/10/18 1007 10/10/18 1009   BP: 102/72 105/68   BP Location: Left arm Left arm   Patient Position: Sitting Sitting   Pulse: 70 71   Weight: 75.4 kg (166 lb 3.2 " "oz)    Height: 177.8 cm (70\")      Body mass index is 23.85 kg/m².   Last weight:  168 lbs.    Physical Exam   Constitutional: He is oriented to person, place, and time. He appears well-developed and well-nourished.   Neck: No JVD present. Carotid bruit is not present. No thyromegaly present.   Cardiovascular: Regular rhythm, S1 normal and S2 normal.  Exam reveals distant heart sounds. Exam reveals no gallop, no S3 and no friction rub.    Murmur heard.   Medium-pitched early systolic murmur is present with a grade of 2/6  at the lower left sternal border  Pulses:       Carotid pulses are 1+ on the right side, and 1+ on the left side.       Radial pulses are 1+ on the right side, and 1+ on the left side.        Femoral pulses are 1+ on the right side, and 1+ on the left side.       Popliteal pulses are 1+ on the right side, and 1+ on the left side.        Dorsalis pedis pulses are 1+ on the right side, and 1+ on the left side.        Posterior tibial pulses are 1+ on the right side, and 1+ on the left side.   Pulmonary/Chest: Effort normal. He has decreased breath sounds. He has no wheezes. He has no rhonchi. He has no rales.   Left precordial PCD site is nominal   Abdominal: Soft. He exhibits no mass. There is no hepatosplenomegaly. There is no tenderness. There is no guarding.   Bowel sounds audible x4   Musculoskeletal: Normal range of motion. He exhibits no edema.   Lymphadenopathy:     He has no cervical adenopathy.   Neurological: He is alert and oriented to person, place, and time.   Skin: Skin is warm, dry and intact. No rash noted.   Severe bilateral upper extremity and hand ecchymosis and skin tears   Vitals reviewed.        Lab Review:   Lab Results   Component Value Date    GLUCOSE 102 (H) 05/16/2018    BUN 18 05/16/2018    CREATININE 1.60 (H) 05/16/2018    EGFRIFNONA 44 (L) 05/16/2018    BCR 11.3 05/16/2018    CO2 27.0 05/16/2018    CALCIUM 9.2 05/16/2018    ALBUMIN 4.70 05/02/2018    AST 12 05/02/2018 "    ALT 7 05/02/2018       Lab Results   Component Value Date    WBC 10.67 05/02/2018    HGB 15.3 05/02/2018    HCT 45.5 05/02/2018    MCV 91.0 05/02/2018     05/02/2018       Lab Results   Component Value Date    HGBA1C 5.60 05/02/2018       Lab Results   Component Value Date    TSH 3.350 05/02/2018       Lab Results   Component Value Date    CHOL 101 05/02/2018    CHOL 129 12/16/2017     Lab Results   Component Value Date    TRIG 74 05/02/2018    TRIG 163 (H) 12/16/2017     Lab Results   Component Value Date    HDL 38 (L) 05/02/2018    HDL 39 (L) 12/16/2017     Lab Results   Component Value Date    LDL 53 05/02/2018    LDL 79 12/16/2017       Lab Results   Component Value Date    .0 (H) 12/15/2017           Assessment:   Overall continued acceptable course with no interim cardiopulmonary complaints with good functional status. We will defer additional diagnostic or therapeutic intervention from a cardiac perspective at this time other than to discontinue his Eliquis and initiate aspirin 81 mg daily.       Diagnosis Plan   1. Hyperlipidemia LDL goal <70  Comprehensive Metabolic Panel    BNP    Magnesium    Lipid Panel   2. Acute on chronic systolic congestive heart failure (CMS/HCC)  Comprehensive Metabolic Panel    BNP    Magnesium   3. Ischemic heart disease  No recurrent angina pectoris or CHF; continue current treatment     4. Ischemic cardiomyopathy  No recurrent angina pectoris or CHF; continue current treatment; marginal systolic blood pressure precludes Entresto use at this time     5. Dyslipidemia  No data to review   6. Chronic kidney disease (CKD), stage III (moderate) (CMS/[facility])  No data to review          Plan:         1. Patient to continue current medications and close follow up with the above providers with the following changes:   A. Discontinue apixaban (Eliquis)   B. Continue on Brilinta for 1 month after stopping apixaban and discontinue on 12/01/2018   C. Initiate aspirin 81 mg  daily  2. Tentative cardiology follow up in March 2019, or patient may return sooner PRN.  3. Patient is encouraged to keep his appointment with Dr. Lopes in February 2019.  4. Influenza immunization is strongly recommended.  5. He will have device clinic followup with EMILY Alaniz in December 2018.  6. The following laboratory studies are ordered today:   A. CMP   B. Magnesium level   C. FLP   D. BNP    Transcribed by Jovanna Lo for Dr. Andrea Guevara at 10:16 AM on 10/10/2018    I, Andrea Guevara MD, [facility], personally performed the services described in this documentation as scribed by the above named individual in my presence, and it is both accurate and complete. At 11:16 AM on 10/10/2018

## 2018-11-20 ENCOUNTER — CLINICAL SUPPORT NO REQUIREMENTS (OUTPATIENT)
Dept: CARDIOLOGY | Facility: CLINIC | Age: 63
End: 2018-11-20

## 2018-11-20 ENCOUNTER — TELEPHONE (OUTPATIENT)
Dept: CARDIOLOGY | Facility: CLINIC | Age: 63
End: 2018-11-20

## 2018-11-20 DIAGNOSIS — I25.5 ISCHEMIC CARDIOMYOPATHY: ICD-10-CM

## 2018-11-20 PROCEDURE — 93296 REM INTERROG EVL PM/IDS: CPT | Performed by: INTERNAL MEDICINE

## 2018-11-20 PROCEDURE — 93295 DEV INTERROG REMOTE 1/2/MLT: CPT | Performed by: INTERNAL MEDICINE

## 2018-11-20 NOTE — TELEPHONE ENCOUNTER
Called pt due to Merlin home monitor isn't connecting or reading.  He said he will check the connections.

## 2018-12-13 ENCOUNTER — OFFICE VISIT (OUTPATIENT)
Dept: CARDIOLOGY | Facility: CLINIC | Age: 63
End: 2018-12-13

## 2018-12-13 VITALS
WEIGHT: 177 LBS | DIASTOLIC BLOOD PRESSURE: 68 MMHG | SYSTOLIC BLOOD PRESSURE: 98 MMHG | HEART RATE: 70 BPM | BODY MASS INDEX: 25.34 KG/M2 | OXYGEN SATURATION: 97 % | HEIGHT: 70 IN

## 2018-12-13 DIAGNOSIS — I25.10 CORONARY ARTERY DISEASE INVOLVING NATIVE CORONARY ARTERY OF NATIVE HEART WITHOUT ANGINA PECTORIS: ICD-10-CM

## 2018-12-13 DIAGNOSIS — I44.7 LEFT BUNDLE BRANCH BLOCK: ICD-10-CM

## 2018-12-13 DIAGNOSIS — Z95.810 CARDIAC RESYNCHRONIZATION THERAPY DEFIBRILLATOR (CRT-D) IN PLACE: Primary | ICD-10-CM

## 2018-12-13 DIAGNOSIS — I50.23 ACUTE ON CHRONIC SYSTOLIC CONGESTIVE HEART FAILURE (HCC): ICD-10-CM

## 2018-12-13 DIAGNOSIS — I48.0 PAROXYSMAL ATRIAL FIBRILLATION (HCC): ICD-10-CM

## 2018-12-13 PROCEDURE — 99214 OFFICE O/P EST MOD 30 MIN: CPT | Performed by: PHYSICIAN ASSISTANT

## 2018-12-13 PROCEDURE — 93284 PRGRMG EVAL IMPLANTABLE DFB: CPT | Performed by: PHYSICIAN ASSISTANT

## 2018-12-13 RX ORDER — ASPIRIN 81 MG/1
81 TABLET ORAL DAILY
COMMUNITY
End: 2020-10-20

## 2018-12-13 NOTE — PROGRESS NOTES
[facility]   OFFICE NOTE      Reza Mcclelland  1955  PCP: Pearl Sabillon PA    SUBJECTIVE:   Reza Mcclelland is a 63 y.o. male seen for a follow up visit regarding the following: ICM, SHF, Afib, [employer] BIVICD    CC: ICM  Problem List:  1. Ischemic heart disease/ischemic cardiomyopathy:  a. Remote non-ST-elevation myocardial infarction with emergent left heart catheterization and successful percutaneous transluminal coronary angioplasty and stenting of the ramus intermedius with a 3.0 x 12 mm Xience drug-eluting stent, 11/19/2008.  b. Left heart catheterization revealing severe stenosis in the LAD of about 80% to 90% in the mid portion with 2.5 x 28 mm, 3.0 x 28 mm, and 3.5 x 18 mm Xience drug-eluting stents in the mid and proximal portion of the LAD, critical left anterior descending stenosis with recommendations to return in 2 weeks for further intervention, 12/04/2008.  c. Left heart catheterization revealing critical right coronary artery stenosis with 3.5 x 28 mm Xience drug-eluting stent deployed in the mid RCA and Xience drug-eluting stent deployed in the proximal RCA, also revealing severe peripheral vascular disease with critical bilateral stenosis of common iliac artery with recommendations for the patient to remain on Plavix and aspirin and further evaluation of iliac stenosis, 12/18/2008.  d. Recurrent CCS class II-III angina with subsequent left heart catheterization and stenting with a 3.5 x 15 mm Xience drug-eluting stent to the proximal LAD and 2.5 x 18 mm Xience drug-eluting stent into the distal LAD with widely patent stents in the RCA, 90% lesion in the stent of the LAD and widely patent stent of the diagonal branch. LVEF (0.65) with no wall motion abnormalities. Severe peripheral arterial disease with 60% stenosis of the left common iliac artery and 80% to 90% stenosis in the distal common femoral artery with recommendations for abdominal aortogram  with right and left lower extremity arteriogram, 01/21/2011.  e. Cardiac catheterization with percutaneous transluminal angioplasty and self-expanding stent placement to the proximal innominate artery, distal embolic protection filter placement in the right carotid artery for cerebral protection during procedure, 10 x 20 mm Xact self-expanding stent deployed to the proximal innominate stenosis, total occlusion of the proximal left internal carotid artery, 11/29/2011.  f. Markedly reduced echocardiographic LVEF (0.25) with left ventricular chamber enlargement and thickened mitral valve leaflets with moderate MR in the absence of pericardial effusion or intracavitary thrombus/mass with markedly abnormal EKG demonstrating LBBB with marked QRS widening, spring 2013.  g. Recurrent progressive chest pain syndrome with severe single-vessel obstructive coronary disease and 100% total occlusion proximal right coronary artery at site of previously placed stent with mild nonobstructive plaque in the left coronary artery system and previously placed stents in the LAD, ramus intermedius artery patent with left ventriculography deferred and moderate bilateral common iliac disease, April 2013, with subsequent bi-ventricular AICD implant ([employer], model #[id number]) by Dr. Kong.  h. Acute non-ST elevation myocardial infarction/left heart catheterization, Dr. Lemus, with Xience drug-eluting stent to the ramus intermedius: Nonobstructive 50% to 60% mid to distal left anterior descending stenosis. Chronic total occlusion of the dominant right coronary artery served by left-sided collaterals with left ventricular systolic dysfunction. EF 10% to 15%, 08/20/2014.  i. Biventricular ICD interrogation 7/7/17; RA pacing 72%, RV pacing 96% longevity 1.8 years, mode switch less than 1%, longest was 7 hours 5/9/17, 1 noise reversion both channels  j. Residual CCS class I angina pectoris/NYHA class II-III exertional dyspnea and  fatigue.   k. Remote apparent acute congestive heart failure with non-STEMI and possible bronchopneumonia with respiratory failure requiring intubation and apparent diagnostic coronary angiography with 3 stents placed with subsequent apparent AMA discharge - data deficit, [facility], [place], [place], November 2017.  l. Residual CCS class I angina pectoris/NYHA class III-IV biventricular CHF, December 2017.  m. Residual class I symptoms, June 2018, October 2018  2. Peripheral vascular disease:  a. Cardiac catheterization revealing total occlusion of left internal carotid artery previously in 2011.  b. CT angiogram of the neck showing total occlusion of the left internal carotid artery, 50% to 60% stenosis of the right carotid bulb, stent in the brachiocephalic artery placed by Dr. Santamaria in November 2011, totally occluded, March 2013.  c. Occluded left carotid artery with occluded innominate artery and intracranial circulation via thyroid artery collaterals and left vertebral with left axillary to right axillary bypass graft using 8 mm. PTFE ringed Propaten graft with arteriogram to the right subclavian artery and right axillary artery and right carotid artery, April 2013.  d. Remote hospitalization, [facility], with congestive heart failure and stroke - data deficit (December 2017), with apparent acute injury noted.  3. Cerebrovascular accident and initiation of warfarin therapy, March 2013.  4. Atrial fibrillation on presentation to outside hospital converted to sinus with Cardizem drip and subsequent recurrence of atrial fibrillation while in the ICU, treated with amiodarone and returned to sinus, August 2014.  5. Dyslipidemia.  6. Stage 3 chronic kidney disease.   7. Left bundle branch block, probably combined ischemic/nonischemic cardiomyopathy.  8. Remote acute-on-chronic respiratory failure with hospital admission for pneumonia on ventilator.  9. History of  hypertension.  10. Tobacco abuse, previously resolved but recurrent, with cessation using Chantix, winter of 2015.  11. No prior additional surgical intervention.   12. Progressive erectile dysfunction.  13. Serial acceptable AICD interrogations: February 2014; August 2014; March 2015, July 2017, with home Merlin monitoring.  14. Hyperthyroidism with abnormal thyroid ultrasound documenting thyroid nodule-data deficit.  2017            HPI:   The patient is a pleasant 63-year-old gentleman presents today for follow-up regarding history of atrial fibrillation, congestive heart failure, ischemic cardio myopathy, [employer] ICD.  The patient reports since last visit with our office she's continues to do well.  He denies any dizziness, near-syncope or syncope.  He denies any chest pain.  He denies palpitations.  He states overall he feels pretty good.  He went to [employer] a few months ago states he had a good time.      ROS:  Review of Symptoms:  General: no recent weight loss/gain, weakness or fatigue  Skin: no rashes, lumps, or other skin changes  HEENT: no dizziness, lightheadedness, or vision changes  Respiratory: no cough or hemoptysis  Cardiovascular: no palpitations, and tachycardia  Gastrointestinal: no black/tarry stools or diarrhea  Urinary: no change in frequency or urgency  Peripheral Vascular: no claudication or leg cramps  Musculoskeletal: no muscle or joint pain/stiffness  Psychiatric: no depression or excessive stress  Neurological: no sensory or motor loss, no syncope  Hematologic: no anemia, easy bruising or bleeding  Endocrine: no thyroid problems, nor heat or cold intolerance    Cardiac PMH: (Old records have been reviewed and summarized below)      Past Medical History, Past Surgical History, Family history, Social History, and Medications were all reviewed with the patient today and updated as necessary.       Current Outpatient Medications:   •  albuterol (PROVENTIL) (5 MG/ML) 0.5% nebulizer solution,  Take 0.5 mL by nebulization Every 6 (Six) Hours As Needed for Wheezing., Disp: 1 mL, Rfl: 12  •  amiodarone (PACERONE) 200 MG tablet, take 1 tablet by mouth once daily, Disp: 90 tablet, Rfl: 2  •  apixaban (ELIQUIS) 2.5 MG tablet tablet, Take 2.5 mg by mouth 2 (Two) Times a Day., Disp: , Rfl:   •  atorvastatin (LIPITOR) 80 MG tablet, take 1 tablet by mouth at bedtime, Disp: 30 tablet, Rfl: 11  •  budesonide-formoterol (SYMBICORT) 160-4.5 MCG/ACT inhaler, Inhale 2 puffs 2 (Two) Times a Day., Disp: 1 inhaler, Rfl: 12  •  carvedilol (COREG) 6.25 MG tablet, take 1 tablet by mouth twice a day with food, Disp: 60 tablet, Rfl: 9  •  nitroglycerin (NITROSTAT) 0.4 MG SL tablet, 1 under the tongue as needed for angina, may repeat q5mins for up three doses, Disp: 100 tablet, Rfl: 11  •  omeprazole (priLOSEC) 40 MG capsule, take 1 capsule by mouth once daily, Disp: 30 capsule, Rfl: 5  •  potassium chloride (K-DUR,KLOR-CON) 20 MEQ CR tablet, take 1 tablet by mouth once daily (Patient taking differently: every other day), Disp: 30 tablet, Rfl: 5  •  spironolactone (ALDACTONE) 25 MG tablet, take 1 tablet by mouth once daily, Disp: 90 tablet, Rfl: 2  •  ticagrelor (BRILINTA) 90 MG tablet tablet, Take 1 tablet by mouth 2 (Two) Times a Day., Disp: 60 tablet, Rfl: 6  •  torsemide (DEMADEX) 10 MG tablet, Take 1 tablet by mouth Daily As Needed (edema)., Disp: 30 tablet, Rfl: 5  •  VENTOLIN  (90 BASE) MCG/ACT inhaler, Inhale 2 puffs Every 6 (Six) Hours As Needed., Disp: , Rfl: 0      No Known Allergies  Patient Active Problem List   Diagnosis   • Coronary artery disease involving native coronary artery of native heart without angina pectoris   • Ischemic cardiomyopathy   • Peripheral vascular disease (CMS/HCC)   • Cerebrovascular accident (CMS/HCC)   • Paroxysmal atrial fibrillation (CMS/HCC)   • Hyperlipidemia LDL goal <70   • Chronic kidney disease (CKD), stage III (moderate) (CMS/HCC)   • Left bundle branch block   • Essential  hypertension   • COPD   • Cardiac resynchronization therapy defibrillator (CRT-D) in place   • Carotid occlusion, left   • Acute on chronic systolic congestive heart failure (CMS/HCC)   • Leukocytosis   • Silent aspiration   • Ischemic heart disease   • Dyslipidemia     Past Medical History:   Diagnosis Date   • Acute on chronic respiratory failure (CMS/HCC)     Recent acute-on-chronic respiratory failure with hospital admission for pneumonia on ventilator.   • AICD (automatic cardioverter/defibrillator) present    • Atrial fibrillation (CMS/[facility])     Atrial fibrillation on presentation to outside hospital converted to sinus with Cardizem drip and subsequent recurrence of atrial fibrillation while in the ICU, treated with amiodarone and returned to sinus, August 2014.   • CAD (coronary artery disease)    • Cerebrovascular accident (CMS/[facility])     Cerebrovascular accident and initiation of warfarin therapy, March 2013.   • Chronic kidney disease (CKD), stage III (moderate) (CMS/[facility])    • Dyslipidemia    • History of hypertension    • Ischemic cardiomyopathy    • Ischemic heart disease    • Left bundle branch block     Left bundle branch block, probably combined ischemic/nonischemic cardiomyopathy.   • Peripheral vascular disease (CMS/[facility])      Past Surgical History:   Procedure Laterality Date   • AXILLARY AXILLARY BYPASS     • CAROTID STENT       Family History   Problem Relation Age of Onset   • Dementia Mother    • Prostate cancer Father    • Heart disease Father    • Cancer Brother         kidney   • No Known Problems Sister    • Heart failure Maternal Grandmother    • Heart disease Maternal Grandmother    • Heart attack Maternal Grandmother    • Heart disease Maternal Grandfather    • Heart attack Maternal Grandfather    • No Known Problems Paternal Grandmother    • Heart attack Paternal Grandfather    • No Known Problems Sister    • Heart attack Paternal Uncle    • Heart disease Paternal Uncle      Social History      Tobacco Use   • Smoking status: Former Smoker     Packs/day: 1.00     Years: 48.00     Pack years: 48.00     Types: Cigarettes     Last attempt to quit: 2017     Years since quittin.0   • Smokeless tobacco: Never Used   • Tobacco comment: Pt quit smoking 6-7 months ago   Substance Use Topics   • Alcohol use: No         PHYSICAL EXAM:    There were no vitals taken for this visit.       Wt Readings from Last 5 Encounters:   10/10/18 75.4 kg (166 lb 3.2 oz)   18 74.6 kg (164 lb 6.4 oz)   18 76.2 kg (168 lb)   18 78.8 kg (173 lb 12.8 oz)   18 87.1 kg (192 lb)       BP Readings from Last 5 Encounters:   10/10/18 105/68   18 100/64   18 91/62   18 97/71   18 91/73       General appearance - Alert, well appearing, and in no distress   Mental status - Affect appropriate to mood.  Eyes - Sclerae anicteric,  ENMT - Hearing grossly normal bilaterally, Dental hygiene good.  Neck -  no bruits, no JVD.  Resp - Clear to auscultation decreased in bases, no wheezes, rales or rhonchi, symmetric air entry.  Heart - Normal rate, regular rhythm, normal S1, S2, no murmurs, rubs, clicks or gallops.  GI - Soft, nontender, nondistended, no masses or organomegaly.  Neurological - Grossly intact - normal speech, no focal findings  Musculoskeletal - No joint tenderness, deformity or swelling, no muscular tenderness noted.  Extremities - Peripheral pulses normal, no pedal edema, no clubbing or cyanosis.  Skin - Normal coloration and turgor.  Psych -  oriented to person, place, and time.    Medical problems and test results were reviewed with the patient today.     No results found for this or any previous visit (from the past 672 hour(s)).    Device Interrogation:  [employer] bi-V ICD.  DDDR 70/1:30.  A paced 97%.  RV paced 99%.  The white 1.9 mV.  No R waves at 40.  Atrial threshold 1.0 V@0.7 ms.  RV threshold 0.75 V at 0.5 ms.  Atrial impedance 410 ohms.  RV impedance 390 ohms.  LV  impedance 560 ohms.  Battery voltage 7% remaining with 5 months remaining.  Charge time 10.4 seconds.  No episodes of atrial fibrillation or VT.    ASSESSMENT   1. PAF: Amiodarone, Sinus no mode switches on device   2.Chronic SHF: continue daily weights, limit sodium, use diuretics   3. ICM: EF 30% by Echo 2017, Continue Coreg, Alsdactone  4. Anticoagulation:  Intolerance to NOAC secondary to bleeding, On brilinta.  5. St. BIVICD:  Normal function.  6. Tobacco abuse: Quit       PLAN  · Continue current medical therapy  · Device is 5 months until DARIEN, will follow up in 3 months or sooner as needed.     12/13/2018  12:15 PM    Will Sylvester VUONG

## 2019-02-11 RX ORDER — POTASSIUM CHLORIDE 20 MEQ/1
TABLET, EXTENDED RELEASE ORAL
Qty: 30 TABLET | Refills: 11 | Status: SHIPPED | OUTPATIENT
Start: 2019-02-11 | End: 2019-03-12

## 2019-02-12 ENCOUNTER — OFFICE VISIT (OUTPATIENT)
Dept: CARDIOLOGY | Facility: CLINIC | Age: 64
End: 2019-02-12

## 2019-02-12 VITALS
DIASTOLIC BLOOD PRESSURE: 60 MMHG | WEIGHT: 182 LBS | HEIGHT: 67 IN | BODY MASS INDEX: 28.56 KG/M2 | HEART RATE: 70 BPM | SYSTOLIC BLOOD PRESSURE: 100 MMHG

## 2019-02-12 DIAGNOSIS — I25.10 CORONARY ARTERY DISEASE INVOLVING NATIVE CORONARY ARTERY OF NATIVE HEART WITHOUT ANGINA PECTORIS: ICD-10-CM

## 2019-02-12 DIAGNOSIS — Z95.810 CARDIAC RESYNCHRONIZATION THERAPY DEFIBRILLATOR (CRT-D) IN PLACE: ICD-10-CM

## 2019-02-12 DIAGNOSIS — I48.0 PAROXYSMAL ATRIAL FIBRILLATION (HCC): ICD-10-CM

## 2019-02-12 DIAGNOSIS — I50.22 CHRONIC SYSTOLIC CONGESTIVE HEART FAILURE (HCC): ICD-10-CM

## 2019-02-12 DIAGNOSIS — I25.5 ISCHEMIC CARDIOMYOPATHY: Primary | ICD-10-CM

## 2019-02-12 PROCEDURE — 93284 PRGRMG EVAL IMPLANTABLE DFB: CPT | Performed by: PHYSICIAN ASSISTANT

## 2019-02-12 PROCEDURE — 99213 OFFICE O/P EST LOW 20 MIN: CPT | Performed by: PHYSICIAN ASSISTANT

## 2019-02-12 NOTE — PROGRESS NOTES
Reza Mcclelland  1955  PCP: Pearl Sabillon PA    SUBJECTIVE:   Reza Mcclelland is a 63 y.o. male seen for a follow up visit regarding the following:     Chief Complaint: Follow up for chronic systolic heart failure, BIV ICD, CAD     HPI:    Since last visit the patient's status has been stable from cardiac standpoint. He has not had any issues with cp. He does report some SOB, but he is at his baseline. He noticed it was worse when the weather was very cold. He has not had any palpitations, edema, orthopnea.     History:  This is a 63-year-old patient followed by Dr. Andrea Guevara.  He has a history of extensive coronary artery disease and chronic systolic heart failure.  He has a previously implanted biventricular ICD.  He continues to have class II to class III angina symptoms. He has quit smoking        Cardiac PMH: (Old records have been reviewed and summarized below)  1. Ischemic heart disease/ischemic cardiomyopathy:  a. Remote non-ST-elevation myocardial infarction with emergent left heart catheterization and successful percutaneous transluminal coronary angioplasty and stenting of the ramus intermedius with a 3.0 x 12 mm Xience drug-eluting stent, 11/19/2008.  b. Left heart catheterization revealing severe stenosis in the LAD of about 80% to 90% in the mid portion with 2.5 x 28 mm, 3.0 x 28 mm, and 3.5 x 18 mm Xience drug-eluting stents in the mid and proximal portion of the LAD, critical left anterior descending stenosis with recommendations to return in 2 weeks for further intervention, 12/04/2008.  c. Left heart catheterization revealing critical right coronary artery stenosis with 3.5 x 28 mm Xience drug-eluting stent deployed in the mid RCA and Xience drug-eluting stent deployed in the proximal RCA, also revealing severe peripheral vascular disease with critical bilateral stenosis of common iliac artery with recommendations for the patient to remain on Plavix and aspirin and further  evaluation of iliac stenosis, 12/18/2008.  d. Recurrent CCS class II-III angina with subsequent left heart catheterization and stenting with a 3.5 x 15 mm Xience drug-eluting stent to the proximal LAD and 2.5 x 18 mm Xience drug-eluting stent into the distal LAD with widely patent stents in the RCA, 90% lesion in the stent of the LAD and widely patent stent of the diagonal branch. LVEF (0.65) with no wall motion abnormalities. Severe peripheral arterial disease with 60% stenosis of the left common iliac artery and 80% to 90% stenosis in the distal common femoral artery with recommendations for abdominal aortogram with right and left lower extremity arteriogram, 01/21/2011.  e. Cardiac catheterization with percutaneous transluminal angioplasty and self-expanding stent placement to the proximal innominate artery, distal embolic protection filter placement in the right carotid artery for cerebral protection during procedure, 10 x 20 mm Xact self-expanding stent deployed to the proximal innominate stenosis, total occlusion of the proximal left internal carotid artery, 11/29/2011.  f. Markedly reduced echocardiographic LVEF (0.25) with left ventricular chamber enlargement and thickened mitral valve leaflets with moderate MR in the absence of pericardial effusion or intracavitary thrombus/mass with markedly abnormal EKG demonstrating LBBB with marked QRS widening, spring 2013.  g. Recurrent progressive chest pain syndrome with severe single-vessel obstructive coronary disease and 100% total occlusion proximal right coronary artery at site of previously placed stent with mild nonobstructive plaque in the left coronary artery system and previously placed stents in the LAD, ramus intermedius artery patent with left ventriculography deferred and moderate bilateral common iliac disease, April 2013, with subsequent bi-ventricular AICD implant ([employer], model #[id number]) by Dr. Kong.  h. Acute non-ST elevation  myocardial infarction/left heart catheterization, Dr. Lemus, with Xience drug-eluting stent to the ramus intermedius: Nonobstructive 50% to 60% mid to distal left anterior descending stenosis. Chronic total occlusion of the dominant right coronary artery served by left-sided collaterals with left ventricular systolic dysfunction. EF 10% to 15%, 08/20/2014.  i. Biventricular ICD interrogation 7/7/17; RA pacing 72%, RV pacing 96% longevity 1.8 years, mode switch less than 1%, longest was 7 hours 5/9/17, 1 noise reversion both channels  j. Residual CCS class I angina pectoris/NYHA class II-III exertional dyspnea and fatigue.   k. Recent apparent acute congestive heart failure with non-STEMI and possible bronchopneumonia with respiratory failure requiring intubation and apparent diagnostic coronary angiography with 3 stents placed with subsequent apparent AMA discharge - data deficit, [facility], [place], [place], November 2017.  l. Residual CCS class I angina pectoris/NYHA class III-IV biventricular CHF, December 2017.  m. Residual class I symptoms, June 2018  2. Peripheral vascular disease:  a. Cardiac catheterization revealing total occlusion of left internal carotid artery previously in 2011.  b. CT angiogram of the neck showing total occlusion of the left internal carotid artery, 50% to 60% stenosis of the right carotid bulb, stent in the brachiocephalic artery placed by Dr. Santamaria in November 2011, totally occluded, March 2013.  c. Occluded left carotid artery with occluded innominate artery and intracranial circulation via thyroid artery collaterals and left vertebral with left axillary to right axillary bypass graft using 8 mm. PTFE ringed Propaten graft with arteriogram to the right subclavian artery and right axillary artery and right carotid artery, April 2013.  d. Remote hospitalization, [facility], with congestive heart failure and stroke - data deficit  (December 2017), with apparent acute injury noted.  3. Cerebrovascular accident and initiation of warfarin therapy, March 2013.  4. Atrial fibrillation on presentation to outside hospital converted to sinus with Cardizem drip and subsequent recurrence of atrial fibrillation while in the ICU, treated with amiodarone and returned to sinus, August 2014.  5. Dyslipidemia.  6. Stage 3 chronic kidney disease.   7. Left bundle branch block, probably combined ischemic/nonischemic cardiomyopathy.  8. Remote acute-on-chronic respiratory failure with hospital admission for pneumonia on ventilator.  9. History of hypertension.  10. Tobacco abuse, previously resolved but recurrent, with cessation using Chantix, winter of 2015.  11. No prior additional surgical intervention.   12. Progressive erectile dysfunction.  13. Serial acceptable AICD interrogations: February 2014; August 2014; March 2015, July 2017, with home Merlin monitoring.  14. Hyperthyroidism with abnormal thyroid ultrasound documenting thyroid nodule-data deficit.          Current Outpatient Medications:   •  albuterol (PROVENTIL) (5 MG/ML) 0.5% nebulizer solution, Take 0.5 mL by nebulization Every 6 (Six) Hours As Needed for Wheezing., Disp: 1 mL, Rfl: 12  •  aspirin 81 MG EC tablet, Take 81 mg by mouth 2 (Two) Times a Day., Disp: , Rfl:   •  atorvastatin (LIPITOR) 80 MG tablet, take 1 tablet by mouth at bedtime, Disp: 30 tablet, Rfl: 11  •  budesonide-formoterol (SYMBICORT) 160-4.5 MCG/ACT inhaler, Inhale 2 puffs 2 (Two) Times a Day., Disp: 1 inhaler, Rfl: 12  •  carvedilol (COREG) 6.25 MG tablet, take 1 tablet by mouth twice a day with food, Disp: 60 tablet, Rfl: 9  •  nitroglycerin (NITROSTAT) 0.4 MG SL tablet, 1 under the tongue as needed for angina, may repeat q5mins for up three doses, Disp: 100 tablet, Rfl: 11  •  omeprazole (priLOSEC) 40 MG capsule, take 1 capsule by mouth once daily, Disp: 30 capsule, Rfl: 5  •  potassium chloride (K-DUR,KLOR-CON) 20 MEQ  CR tablet, take 1 tablet by mouth once daily, Disp: 30 tablet, Rfl: 11  •  spironolactone (ALDACTONE) 25 MG tablet, take 1 tablet by mouth once daily, Disp: 90 tablet, Rfl: 2  •  VENTOLIN  (90 BASE) MCG/ACT inhaler, Inhale 2 puffs Every 6 (Six) Hours As Needed., Disp: , Rfl: 0  •  amiodarone (PACERONE) 200 MG tablet, take 1 tablet by mouth once daily, Disp: 90 tablet, Rfl: 2    Past Medical History, Past Surgical History, Family history, Social History, and Medications were all reviewed with the patient today and updated as necessary.       Patient Active Problem List   Diagnosis   • Coronary artery disease involving native coronary artery of native heart without angina pectoris   • Ischemic cardiomyopathy   • Peripheral vascular disease (CMS/HCC)   • Cerebrovascular accident (CMS/HCC)   • Paroxysmal atrial fibrillation (CMS/HCC)   • Hyperlipidemia LDL goal <70   • Chronic kidney disease (CKD), stage III (moderate) (CMS/HCC)   • Left bundle branch block   • Essential hypertension   • COPD   • Cardiac resynchronization therapy defibrillator (CRT-D) in place   • Carotid occlusion, left   • Chronic systolic congestive heart failure (CMS/HCC)   • Leukocytosis   • Silent aspiration   • Ischemic heart disease   • Dyslipidemia     No Known Allergies  Past Medical History:   Diagnosis Date   • Acute on chronic respiratory failure (CMS/HCC)     Recent acute-on-chronic respiratory failure with hospital admission for pneumonia on ventilator.   • AICD (automatic cardioverter/defibrillator) present    • Atrial fibrillation (CMS/HCC)     Atrial fibrillation on presentation to outside hospital converted to sinus with Cardizem drip and subsequent recurrence of atrial fibrillation while in the ICU, treated with amiodarone and returned to sinus, August 2014.   • CAD (coronary artery disease)    • Cerebrovascular accident (CMS/HCC)     Cerebrovascular accident and initiation of warfarin therapy, March 2013.   • Chronic kidney  "disease (CKD), stage III (moderate) (CMS/HCC)    • Dyslipidemia    • History of hypertension    • Ischemic cardiomyopathy    • Ischemic heart disease    • Left bundle branch block     Left bundle branch block, probably combined ischemic/nonischemic cardiomyopathy.   • Peripheral vascular disease (CMS/HCC)      Past Surgical History:   Procedure Laterality Date   • AXILLARY AXILLARY BYPASS     • CAROTID STENT       Family History   Problem Relation Age of Onset   • Dementia Mother    • Prostate cancer Father    • Heart disease Father    • Cancer Brother         kidney   • No Known Problems Sister    • Heart failure Maternal Grandmother    • Heart disease Maternal Grandmother    • Heart attack Maternal Grandmother    • Heart disease Maternal Grandfather    • Heart attack Maternal Grandfather    • No Known Problems Paternal Grandmother    • Heart attack Paternal Grandfather    • No Known Problems Sister    • Heart attack Paternal Uncle    • Heart disease Paternal Uncle      Social History     Tobacco Use   • Smoking status: Former Smoker     Packs/day: 1.00     Years: 48.00     Pack years: 48.00     Types: Cigarettes     Last attempt to quit: 2017     Years since quittin.2   • Smokeless tobacco: Never Used   • Tobacco comment: Pt quit smoking 6-7 months ago   Substance Use Topics   • Alcohol use: No         PHYSICAL EXAM:    /60 (BP Location: Left arm, Patient Position: Sitting)   Pulse 70   Ht 170.2 cm (67\")   Wt 82.6 kg (182 lb)   BMI 28.51 kg/m²        Wt Readings from Last 5 Encounters:   19 82.6 kg (182 lb)   18 80.3 kg (177 lb)   10/10/18 75.4 kg (166 lb 3.2 oz)   18 74.6 kg (164 lb 6.4 oz)   18 76.2 kg (168 lb)       BP Readings from Last 5 Encounters:   19 100/60   18 98/68   10/10/18 105/68   18 100/64   18 91/62       General-Well Nourished, Well developed  Eyes - PERRLA  Neck- supple, No mass  CV- regular rate and rhythm, no MRG, No " edema  Lung- clear bilaterally  Abd- soft, +BS  Musc/skel - Norm strength and range of motion  Skin- warm and dry  Neuro - Alert & Oriented x 3, appropriate mood.        Medical problems and test results were reviewed with the patient today.     No results found for this or any previous visit (from the past 672 hour(s)).      EKG: (EKG has been independently visualized by me and summarized below)      ECG 12 Lead  Date/Time: 2/12/2019 10:00 AM  Performed by: Celia Vela PA  Authorized by: Celia Vela PA   Rhythm: sinus rhythm and paced  Rate: normal  BPM: 70  Conduction: conduction normal  QRS axis: normal    Clinical impression: non-specific ECG  Comments: AV dual paced rhythm              Device Interrogation: SJM BIV ICD; nml fxn. BIV pacing >99%; no events. Battery at 3.5 months     ASSESSMENT and PLAN    1.  Chronic systolic heart failure-stable overall. Followed by Dr. Guevara. Continue optimal rx, daily weights, sodium restriction.   2.  Biventricular ICD-EKG vectors suggest Anterior wall pacing. Normal function. Battery at 3.5 months. Will plan for generator change in 4-6 weeks.   3.  Atrial Fibrillation - Current burden is 0% - Intolerance to NOAC secondary to bleeding so Eliquis was discontinued. Continue Amiodarone.   4. CAD- Brilinta recently discontinued 12/2018. Continue ASA, Statin, Beta blocker. Follow-up with Dr. Guevara.          Return for after procedure .        Celia Vela PA-C   Cardiology/Electrophysiology  2/12/2019  10:01 AM

## 2019-02-27 RX ORDER — AMIODARONE HYDROCHLORIDE 200 MG/1
TABLET ORAL
Qty: 90 TABLET | Refills: 2 | Status: SHIPPED | OUTPATIENT
Start: 2019-02-27 | End: 2019-03-12

## 2019-03-04 ENCOUNTER — PREP FOR SURGERY (OUTPATIENT)
Dept: OTHER | Facility: HOSPITAL | Age: 64
End: 2019-03-04

## 2019-03-04 DIAGNOSIS — I50.22 CHRONIC SYSTOLIC HEART FAILURE (HCC): Primary | ICD-10-CM

## 2019-03-04 RX ORDER — CEFAZOLIN SODIUM 2 G/100ML
2 INJECTION, SOLUTION INTRAVENOUS ONCE
Status: CANCELLED | OUTPATIENT
Start: 2019-03-04

## 2019-03-04 RX ORDER — SODIUM CHLORIDE 0.9 % (FLUSH) 0.9 %
3-10 SYRINGE (ML) INJECTION AS NEEDED
Status: CANCELLED | OUTPATIENT
Start: 2019-03-04

## 2019-03-04 RX ORDER — SODIUM CHLORIDE 0.9 % (FLUSH) 0.9 %
3 SYRINGE (ML) INJECTION EVERY 12 HOURS SCHEDULED
Status: CANCELLED | OUTPATIENT
Start: 2019-03-04

## 2019-03-12 ENCOUNTER — APPOINTMENT (OUTPATIENT)
Dept: PREADMISSION TESTING | Facility: HOSPITAL | Age: 64
End: 2019-03-12

## 2019-03-12 DIAGNOSIS — I50.22 CHRONIC SYSTOLIC HEART FAILURE (HCC): ICD-10-CM

## 2019-03-12 LAB
ALBUMIN SERPL-MCNC: 4.73 G/DL (ref 3.2–4.8)
ALBUMIN/GLOB SERPL: 2.4 G/DL (ref 1.5–2.5)
ALP SERPL-CCNC: 74 U/L (ref 25–100)
ALT SERPL W P-5'-P-CCNC: 17 U/L (ref 7–40)
ANION GAP SERPL CALCULATED.3IONS-SCNC: 5 MMOL/L (ref 3–11)
AST SERPL-CCNC: 18 U/L (ref 0–33)
BASOPHILS # BLD AUTO: 0.12 10*3/MM3 (ref 0–0.2)
BASOPHILS NFR BLD AUTO: 1.2 % (ref 0–1)
BILIRUB SERPL-MCNC: 1.4 MG/DL (ref 0.3–1.2)
BUN BLD-MCNC: 26 MG/DL (ref 9–23)
BUN/CREAT SERPL: 16.6 (ref 7–25)
CALCIUM SPEC-SCNC: 9.5 MG/DL (ref 8.7–10.4)
CHLORIDE SERPL-SCNC: 105 MMOL/L (ref 99–109)
CO2 SERPL-SCNC: 26 MMOL/L (ref 20–31)
CREAT BLD-MCNC: 1.57 MG/DL (ref 0.6–1.3)
DEPRECATED RDW RBC AUTO: 49.2 FL (ref 37–54)
EOSINOPHIL # BLD AUTO: 0.27 10*3/MM3 (ref 0–0.3)
EOSINOPHIL NFR BLD AUTO: 2.6 % (ref 0–3)
ERYTHROCYTE [DISTWIDTH] IN BLOOD BY AUTOMATED COUNT: 14.5 % (ref 11.3–14.5)
GFR SERPL CREATININE-BSD FRML MDRD: 45 ML/MIN/1.73
GLOBULIN UR ELPH-MCNC: 2 GM/DL
GLUCOSE BLD-MCNC: 105 MG/DL (ref 70–100)
HCT VFR BLD AUTO: 48.1 % (ref 38.9–50.9)
HGB BLD-MCNC: 15.9 G/DL (ref 13.1–17.5)
IMM GRANULOCYTES # BLD AUTO: 0.06 10*3/MM3 (ref 0–0.05)
IMM GRANULOCYTES NFR BLD AUTO: 0.6 % (ref 0–0.6)
LYMPHOCYTES # BLD AUTO: 2.09 10*3/MM3 (ref 0.6–4.8)
LYMPHOCYTES NFR BLD AUTO: 20.2 % (ref 24–44)
MCH RBC QN AUTO: 30.5 PG (ref 27–31)
MCHC RBC AUTO-ENTMCNC: 33.1 G/DL (ref 32–36)
MCV RBC AUTO: 92.3 FL (ref 80–99)
MONOCYTES # BLD AUTO: 0.63 10*3/MM3 (ref 0–1)
MONOCYTES NFR BLD AUTO: 6.1 % (ref 0–12)
NEUTROPHILS # BLD AUTO: 7.24 10*3/MM3 (ref 1.5–8.3)
NEUTROPHILS NFR BLD AUTO: 69.9 % (ref 41–71)
NRBC BLD AUTO-RTO: 0 /100 WBC (ref 0–0)
PLATELET # BLD AUTO: 150 10*3/MM3 (ref 150–450)
PMV BLD AUTO: 10 FL (ref 6–12)
POTASSIUM BLD-SCNC: 5.1 MMOL/L (ref 3.5–5.5)
PROT SERPL-MCNC: 6.7 G/DL (ref 5.7–8.2)
RBC # BLD AUTO: 5.21 10*6/MM3 (ref 4.2–5.76)
SODIUM BLD-SCNC: 136 MMOL/L (ref 132–146)
WBC NRBC COR # BLD: 10.35 10*3/MM3 (ref 3.5–10.8)

## 2019-03-12 PROCEDURE — 36415 COLL VENOUS BLD VENIPUNCTURE: CPT

## 2019-03-12 PROCEDURE — 85025 COMPLETE CBC W/AUTO DIFF WBC: CPT | Performed by: PHYSICIAN ASSISTANT

## 2019-03-12 PROCEDURE — 80053 COMPREHEN METABOLIC PANEL: CPT | Performed by: PHYSICIAN ASSISTANT

## 2019-03-12 RX ORDER — ATORVASTATIN CALCIUM 80 MG/1
80 TABLET, FILM COATED ORAL NIGHTLY
COMMUNITY
End: 2019-08-02 | Stop reason: SDUPTHER

## 2019-03-12 RX ORDER — AMIODARONE HYDROCHLORIDE 200 MG/1
100 TABLET ORAL DAILY
COMMUNITY
End: 2019-12-03 | Stop reason: SDUPTHER

## 2019-03-12 RX ORDER — OMEPRAZOLE 40 MG/1
40 CAPSULE, DELAYED RELEASE ORAL DAILY
Status: ON HOLD | COMMUNITY
End: 2019-03-13

## 2019-03-12 RX ORDER — POTASSIUM CHLORIDE 1.5 G/1.77G
20 POWDER, FOR SOLUTION ORAL DAILY
Status: ON HOLD | COMMUNITY
End: 2020-02-12 | Stop reason: SDUPTHER

## 2019-03-12 RX ORDER — CARVEDILOL 6.25 MG/1
6.25 TABLET ORAL 2 TIMES DAILY WITH MEALS
COMMUNITY
End: 2019-03-29 | Stop reason: SDUPTHER

## 2019-03-12 NOTE — DISCHARGE INSTRUCTIONS
The following instructions given during Pre Admission Testing visit:    Do not eat or drink anything after MN except for sips of water with your a.m. Prescription meds unless otherwise instructed by your physician.    Glasses and jewelry may be worn, but dentures must be removed prior to cath/procedure.    Leave any items you consider valuable at home.    Family members may wait in [facility] waiting area during procedure.    Bring all medications in their original containers the day of procedure.    Bring photo ID and insurance cards on the day of procedure.    Need to make arrangements for transportation prior to discharge.    The following handouts were given:     Heart Cath pathway (if applicable)   Cardiac Cath booklet published by [facility]    OR appropriate [facility] procedure booklet    If applicable, pt instructed to bring CPAP mask and tubing the day of procedure.

## 2019-03-13 ENCOUNTER — HOSPITAL ENCOUNTER (OUTPATIENT)
Facility: HOSPITAL | Age: 64
Setting detail: HOSPITAL OUTPATIENT SURGERY
Discharge: HOME | End: 2019-03-13
Attending: INTERNAL MEDICINE | Admitting: PHYSICIAN ASSISTANT

## 2019-03-13 VITALS
OXYGEN SATURATION: 96 % | TEMPERATURE: 98 F | SYSTOLIC BLOOD PRESSURE: 102 MMHG | WEIGHT: 171.08 LBS | HEART RATE: 69 BPM | HEIGHT: 70 IN | DIASTOLIC BLOOD PRESSURE: 72 MMHG | RESPIRATION RATE: 14 BRPM | BODY MASS INDEX: 24.49 KG/M2

## 2019-03-13 DIAGNOSIS — I50.22 CHRONIC SYSTOLIC CONGESTIVE HEART FAILURE (HCC): ICD-10-CM

## 2019-03-13 PROCEDURE — 93641 EP EVL 1/2CHMB PAC CVDFB TST: CPT | Performed by: INTERNAL MEDICINE

## 2019-03-13 PROCEDURE — S0260 H&P FOR SURGERY: HCPCS | Performed by: INTERNAL MEDICINE

## 2019-03-13 PROCEDURE — 25010000002 MIDAZOLAM PER 1 MG: Performed by: INTERNAL MEDICINE

## 2019-03-13 PROCEDURE — 99153 MOD SED SAME PHYS/QHP EA: CPT | Performed by: INTERNAL MEDICINE

## 2019-03-13 PROCEDURE — 33264 RMVL & RPLCMT DFB GEN MLT LD: CPT | Performed by: INTERNAL MEDICINE

## 2019-03-13 PROCEDURE — 99152 MOD SED SAME PHYS/QHP 5/>YRS: CPT | Performed by: INTERNAL MEDICINE

## 2019-03-13 PROCEDURE — 25010000002 FENTANYL CITRATE (PF) 100 MCG/2ML SOLUTION: Performed by: INTERNAL MEDICINE

## 2019-03-13 PROCEDURE — 25010000003 CEFAZOLIN IN DEXTROSE 2-4 GM/100ML-% SOLUTION: Performed by: PHYSICIAN ASSISTANT

## 2019-03-13 PROCEDURE — C1882 AICD, OTHER THAN SING/DUAL: HCPCS | Performed by: INTERNAL MEDICINE

## 2019-03-13 DEVICE — ICD QUADRA ASSURA NXGN MP CRTD40 CD336940Q: Type: IMPLANTABLE DEVICE | Status: FUNCTIONAL

## 2019-03-13 RX ORDER — OXYCODONE HYDROCHLORIDE AND ACETAMINOPHEN 5; 325 MG/1; MG/1
1 TABLET ORAL EVERY 6 HOURS PRN
Qty: 5 TABLET | Refills: 0 | Status: SHIPPED | OUTPATIENT
Start: 2019-03-13 | End: 2020-01-24

## 2019-03-13 RX ORDER — SODIUM CHLORIDE 0.9 % (FLUSH) 0.9 %
3 SYRINGE (ML) INJECTION EVERY 12 HOURS SCHEDULED
Status: DISCONTINUED | OUTPATIENT
Start: 2019-03-13 | End: 2019-03-13 | Stop reason: HOSPADM

## 2019-03-13 RX ORDER — SODIUM CHLORIDE 0.9 % (FLUSH) 0.9 %
3-10 SYRINGE (ML) INJECTION AS NEEDED
Status: DISCONTINUED | OUTPATIENT
Start: 2019-03-13 | End: 2019-03-13 | Stop reason: HOSPADM

## 2019-03-13 RX ORDER — FENTANYL CITRATE 50 UG/ML
INJECTION, SOLUTION INTRAMUSCULAR; INTRAVENOUS AS NEEDED
Status: DISCONTINUED | OUTPATIENT
Start: 2019-03-13 | End: 2019-03-13 | Stop reason: HOSPADM

## 2019-03-13 RX ORDER — LISINOPRIL 2.5 MG/1
2.5 TABLET ORAL DAILY
Qty: 30 TABLET | Refills: 11 | Status: SHIPPED | OUTPATIENT
Start: 2019-03-13 | End: 2020-02-21

## 2019-03-13 RX ORDER — LIDOCAINE HYDROCHLORIDE AND EPINEPHRINE 10; 10 MG/ML; UG/ML
INJECTION, SOLUTION INFILTRATION; PERINEURAL AS NEEDED
Status: DISCONTINUED | OUTPATIENT
Start: 2019-03-13 | End: 2019-03-13 | Stop reason: HOSPADM

## 2019-03-13 RX ORDER — CEFAZOLIN SODIUM 2 G/100ML
2 INJECTION, SOLUTION INTRAVENOUS ONCE
Status: COMPLETED | OUTPATIENT
Start: 2019-03-13 | End: 2019-03-13

## 2019-03-13 RX ORDER — BUPIVACAINE HYDROCHLORIDE 5 MG/ML
INJECTION, SOLUTION EPIDURAL; INTRACAUDAL AS NEEDED
Status: DISCONTINUED | OUTPATIENT
Start: 2019-03-13 | End: 2019-03-13 | Stop reason: HOSPADM

## 2019-03-13 RX ORDER — SPIRONOLACTONE 25 MG/1
25 TABLET ORAL DAILY
COMMUNITY
End: 2019-04-03 | Stop reason: SDUPTHER

## 2019-03-13 RX ORDER — CEPHALEXIN 500 MG/1
500 CAPSULE ORAL 3 TIMES DAILY
Qty: 15 CAPSULE | Refills: 0 | Status: SHIPPED | OUTPATIENT
Start: 2019-03-13 | End: 2019-03-18

## 2019-03-13 RX ORDER — MIDAZOLAM HYDROCHLORIDE 1 MG/ML
INJECTION INTRAMUSCULAR; INTRAVENOUS AS NEEDED
Status: DISCONTINUED | OUTPATIENT
Start: 2019-03-13 | End: 2019-03-13 | Stop reason: HOSPADM

## 2019-03-13 RX ORDER — SODIUM CHLORIDE 9 MG/ML
INJECTION, SOLUTION INTRAVENOUS CONTINUOUS PRN
Status: COMPLETED | OUTPATIENT
Start: 2019-03-13 | End: 2019-03-13

## 2019-03-13 RX ADMIN — CEFAZOLIN SODIUM 2 G: 2 INJECTION, SOLUTION INTRAVENOUS at 08:09

## 2019-03-13 NOTE — H&P
Primary Cardiologist: Andrea Guevara MD     Chief Complaint: BIV ICD DARIEN       Subjective:     Patient is a 64 y.o. male who presents with chronic systolic heart failure s/p BIV ICD that presents today for generator change. Since he was seen in the office, he has not had any changes. No fevers or chills.     History:  This is a 63-year-old patient followed by Dr. Andrea Guevara.  He has a history of extensive coronary artery disease and chronic systolic heart failure.  He has a previously implanted biventricular ICD.  He continues to have class II to class III angina symptoms. He has quit smoking        Cardiac PMH: (Old records have been reviewed and summarized below)  1. Ischemic heart disease/ischemic cardiomyopathy:  a. Remote non-ST-elevation myocardial infarction with emergent left heart catheterization and successful percutaneous transluminal coronary angioplasty and stenting of the ramus intermedius with a 3.0 x 12 mm Xience drug-eluting stent, 11/19/2008.  b. Left heart catheterization revealing severe stenosis in the LAD of about 80% to 90% in the mid portion with 2.5 x 28 mm, 3.0 x 28 mm, and 3.5 x 18 mm Xience drug-eluting stents in the mid and proximal portion of the LAD, critical left anterior descending stenosis with recommendations to return in 2 weeks for further intervention, 12/04/2008.  c. Left heart catheterization revealing critical right coronary artery stenosis with 3.5 x 28 mm Xience drug-eluting stent deployed in the mid RCA and Xience drug-eluting stent deployed in the proximal RCA, also revealing severe peripheral vascular disease with critical bilateral stenosis of common iliac artery with recommendations for the patient to remain on Plavix and aspirin and further evaluation of iliac stenosis, 12/18/2008.  d. Recurrent CCS class II-III angina with subsequent left heart catheterization and stenting with a 3.5 x 15 mm Xience drug-eluting stent to the proximal LAD and 2.5 x 18 mm Xience  drug-eluting stent into the distal LAD with widely patent stents in the RCA, 90% lesion in the stent of the LAD and widely patent stent of the diagonal branch. LVEF (0.65) with no wall motion abnormalities. Severe peripheral arterial disease with 60% stenosis of the left common iliac artery and 80% to 90% stenosis in the distal common femoral artery with recommendations for abdominal aortogram with right and left lower extremity arteriogram, 01/21/2011.  e. Cardiac catheterization with percutaneous transluminal angioplasty and self-expanding stent placement to the proximal innominate artery, distal embolic protection filter placement in the right carotid artery for cerebral protection during procedure, 10 x 20 mm Xact self-expanding stent deployed to the proximal innominate stenosis, total occlusion of the proximal left internal carotid artery, 11/29/2011.  f. Markedly reduced echocardiographic LVEF (0.25) with left ventricular chamber enlargement and thickened mitral valve leaflets with moderate MR in the absence of pericardial effusion or intracavitary thrombus/mass with markedly abnormal EKG demonstrating LBBB with marked QRS widening, spring 2013.  g. Recurrent progressive chest pain syndrome with severe single-vessel obstructive coronary disease and 100% total occlusion proximal right coronary artery at site of previously placed stent with mild nonobstructive plaque in the left coronary artery system and previously placed stents in the LAD, ramus intermedius artery patent with left ventriculography deferred and moderate bilateral common iliac disease, April 2013, with subsequent bi-ventricular AICD implant ([employer], model #[id number]) by Dr. Kong.  h. Acute non-ST elevation myocardial infarction/left heart catheterization, Dr. Lemus, with Xience drug-eluting stent to the ramus intermedius: Nonobstructive 50% to 60% mid to distal left anterior descending stenosis. Chronic total occlusion of the  dominant right coronary artery served by left-sided collaterals with left ventricular systolic dysfunction. EF 10% to 15%, 08/20/2014.  i. Biventricular ICD interrogation 7/7/17; RA pacing 72%, RV pacing 96% longevity 1.8 years, mode switch less than 1%, longest was 7 hours 5/9/17, 1 noise reversion both channels  j. Residual CCS class I angina pectoris/NYHA class II-III exertional dyspnea and fatigue.   k. Recent apparent acute congestive heart failure with non-STEMI and possible bronchopneumonia with respiratory failure requiring intubation and apparent diagnostic coronary angiography with 3 stents placed with subsequent apparent AMA discharge - data deficit, [facility], [place], [place], November 2017.  l. Residual CCS class I angina pectoris/NYHA class III-IV biventricular CHF, December 2017.  m. Residual class I symptoms, June 2018  2. Peripheral vascular disease:  a. Cardiac catheterization revealing total occlusion of left internal carotid artery previously in 2011.  b. CT angiogram of the neck showing total occlusion of the left internal carotid artery, 50% to 60% stenosis of the right carotid bulb, stent in the brachiocephalic artery placed by Dr. Santamaria in November 2011, totally occluded, March 2013.  c. Occluded left carotid artery with occluded innominate artery and intracranial circulation via thyroid artery collaterals and left vertebral with left axillary to right axillary bypass graft using 8 mm. PTFE ringed Propaten graft with arteriogram to the right subclavian artery and right axillary artery and right carotid artery, April 2013.  d. Remote hospitalization, [facility], with congestive heart failure and stroke - data deficit (December 2017), with apparent acute injury noted.  3. Cerebrovascular accident and initiation of warfarin therapy, March 2013.  4. Atrial fibrillation on presentation to outside hospital converted to sinus with Cardizem drip and  subsequent recurrence of atrial fibrillation while in the ICU, treated with amiodarone and returned to sinus, August 2014.  5. Dyslipidemia.  6. Stage 3 chronic kidney disease.   7. Left bundle branch block, probably combined ischemic/nonischemic cardiomyopathy.  8. Remote acute-on-chronic respiratory failure with hospital admission for pneumonia on ventilator.  9. History of hypertension.  10. Tobacco abuse, previously resolved but recurrent, with cessation using Chantix, winter of 2015.  11. No prior additional surgical intervention.   12. Progressive erectile dysfunction.  13. Serial acceptable AICD interrogations: February 2014; August 2014; March 2015, July 2017, with home Merlin monitoring.  14. Hyperthyroidism with abnormal thyroid ultrasound documenting thyroid nodule-data deficit.        Past Medical History:   Diagnosis Date   • Acute on chronic respiratory failure (CMS/HCC)     Recent acute-on-chronic respiratory failure with hospital admission for pneumonia on ventilator.   • AICD (automatic cardioverter/defibrillator) present    • Atrial fibrillation (CMS/HCC)     Atrial fibrillation on presentation to outside hospital converted to sinus with Cardizem drip and subsequent recurrence of atrial fibrillation while in the ICU, treated with amiodarone and returned to sinus, August 2014.   • CAD (coronary artery disease)    • Cerebrovascular accident (CMS/HCC)     Cerebrovascular accident and initiation of warfarin therapy, March 2013.   • Chronic kidney disease (CKD), stage III (moderate) (CMS/HCC)    • COPD (chronic obstructive pulmonary disease) (CMS/HCC)    • Dyslipidemia    • History of hypertension    • Ischemic cardiomyopathy    • Ischemic heart disease    • Left bundle branch block     Left bundle branch block, probably combined ischemic/nonischemic cardiomyopathy.   • Peripheral vascular disease (CMS/HCC)       Past Surgical History:   Procedure Laterality Date   • AXILLARY AXILLARY BYPASS     •  "CARDIAC DEFIBRILLATOR PLACEMENT     • CAROTID STENT        No Known Allergies  Social History     Tobacco Use   • Smoking status: Former Smoker     Packs/day: 1.00     Years: 48.00     Pack years: 48.00     Types: Cigarettes     Last attempt to quit: 2017     Years since quittin.2   • Smokeless tobacco: Never Used   • Tobacco comment: Pt quit smoking 6-7 months ago   Substance Use Topics   • Alcohol use: No      FH:   Family History   Problem Relation Age of Onset   • Dementia Mother    • Prostate cancer Father    • Heart disease Father    • Cancer Brother         kidney   • No Known Problems Sister    • Heart failure Maternal Grandmother    • Heart disease Maternal Grandmother    • Heart attack Maternal Grandmother    • Heart disease Maternal Grandfather    • Heart attack Maternal Grandfather    • No Known Problems Paternal Grandmother    • Heart attack Paternal Grandfather    • No Known Problems Sister    • Heart attack Paternal Uncle    • Heart disease Paternal Uncle           Current Facility-Administered Medications:   •  ceFAZolin in dextrose (ANCEF) IVPB solution 2 g, 2 g, Intravenous, Once, Celia Vela PA  •  sodium chloride 0.9 % flush 3 mL, 3 mL, Intravenous, Q12H, Celia Vela PA  •  sodium chloride 0.9 % flush 3-10 mL, 3-10 mL, Intravenous, PRN, Celia Vela PA          Objective:       /67 (BP Location: Left arm, Patient Position: Lying) Comment: 84/68 right  Pulse 69   Temp 98 °F (36.7 °C) (Temporal)   Resp 16   Ht 177.8 cm (70\")   Wt 77.6 kg (171 lb 1.2 oz)   SpO2 96%   BMI 24.55 kg/m²     No intake/output data recorded.  No intake/output data recorded.    Physical Exam:  General-Well Nourished, Well developed  Eyes - PERRLA  Neck- supple, No mass  CV- regular rate and rhythm, no MRG  Lung- clear bilaterally  Abd- soft, +BS  Musc/skel - Norm strength and range of motion  Skin- warm and dry  Neuro - Alert & Oriented x 3, appropriate mood.          Data Review: "     Recent Results (from the past 24 hour(s))   Comprehensive Metabolic Panel    Collection Time: 03/12/19 11:15 AM   Result Value Ref Range    Glucose 105 (H) 70 - 100 mg/dL    BUN 26 (H) 9 - 23 mg/dL    Creatinine 1.57 (H) 0.60 - 1.30 mg/dL    Sodium 136 132 - 146 mmol/L    Potassium 5.1 3.5 - 5.5 mmol/L    Chloride 105 99 - 109 mmol/L    CO2 26.0 20.0 - 31.0 mmol/L    Calcium 9.5 8.7 - 10.4 mg/dL    Total Protein 6.7 5.7 - 8.2 g/dL    Albumin 4.73 3.20 - 4.80 g/dL    ALT (SGPT) 17 7 - 40 U/L    AST (SGOT) 18 0 - 33 U/L    Alkaline Phosphatase 74 25 - 100 U/L    Total Bilirubin 1.4 (H) 0.3 - 1.2 mg/dL    eGFR Non African Amer 45 (L) >60 mL/min/1.73    Globulin 2.0 gm/dL    A/G Ratio 2.4 1.5 - 2.5 g/dL    BUN/Creatinine Ratio 16.6 7.0 - 25.0    Anion Gap 5.0 3.0 - 11.0 mmol/L   CBC Auto Differential    Collection Time: 03/12/19 11:15 AM   Result Value Ref Range    WBC 10.35 3.50 - 10.80 10*3/mm3    RBC 5.21 4.20 - 5.76 10*6/mm3    Hemoglobin 15.9 13.1 - 17.5 g/dL    Hematocrit 48.1 38.9 - 50.9 %    MCV 92.3 80.0 - 99.0 fL    MCH 30.5 27.0 - 31.0 pg    MCHC 33.1 32.0 - 36.0 g/dL    RDW 14.5 11.3 - 14.5 %    RDW-SD 49.2 37.0 - 54.0 fl    MPV 10.0 6.0 - 12.0 fL    Platelets 150 150 - 450 10*3/mm3    Neutrophil % 69.9 41.0 - 71.0 %    Lymphocyte % 20.2 (L) 24.0 - 44.0 %    Monocyte % 6.1 0.0 - 12.0 %    Eosinophil % 2.6 0.0 - 3.0 %    Basophil % 1.2 (H) 0.0 - 1.0 %    Immature Grans % 0.6 0.0 - 0.6 %    Neutrophils, Absolute 7.24 1.50 - 8.30 10*3/mm3    Lymphocytes, Absolute 2.09 0.60 - 4.80 10*3/mm3    Monocytes, Absolute 0.63 0.00 - 1.00 10*3/mm3    Eosinophils, Absolute 0.27 0.00 - 0.30 10*3/mm3    Basophils, Absolute 0.12 0.00 - 0.20 10*3/mm3    Immature Grans, Absolute 0.06 (H) 0.00 - 0.05 10*3/mm3    nRBC 0.0 0.0 - 0.0 /100 WBC           Assessment:     Chronic systolic congestive heart failure (CMS/HCC)         Plan:     1.  Chronic systolic heart failure-stable overall. Followed by Dr. Guevara. Continue optimal  rx, daily weights, sodium restriction.   2.  Biventricular ICD-EKG vectors suggest Anterior wall pacing. Normal function. Battery at 3.5 months 4 weeks ago. Will plan for generator change today.     Electronically signed by EMILY Sparrow, 03/13/19, 7:32 AM.      Reza Lopes M.D., JAMES, F.H.R.S.  Cardiology/Electrophysiology  3/13/2019  7:43 AM

## 2019-03-13 NOTE — PROCEDURES
PRE-ELECTROPHYSIOLOGY STUDY DIAGNOSES  1. Biventricular implantable cardioverter-defibrillator at elective replacement interval.  2. Ischemic dilated cardiomyopathy.  3. Ejection fraction 20-25%.  4. Mod to sev mitral regurgitation.  5. Left bundle branch block.  6. Class III heart failure symptoms.  7. Initial implantable cardioverter-defibrillator was implanted for primary prevention.  8. Patient has indications for permanent pacing secondary to resynchronized biventricular therapy   9. On Guideline directed medical therapy maximum dose for >3 months prior to implant. No ACEI/ARB secondary to hypotension  10. Patient has indications for permanent pacing secondary to >40% RV Paving    PROCEDURES PERFORMED  1. Removal and replacement of a multi-lead biventricular implantable cardioverter-defibrillator.  2. Testing of implantable cardioverter-defibrillator.  3. Sedation    Anesthesia: Cath lab moderate sedation    I was present with the patient for the duration of moderate sedation and supervised staff who had no other duties and monitored the patient for the entire procedure     Name of independent trained observer: Marva Smith RN  Intra-Service start time: 0808  Intra-Service end time: 0904     Estimated Blood Loss: Less than 10 mL     Specimens: None     PROCEDURE IN DETAIL: The patient was brought into the EP lab in a fasting  state. The left shoulder was prepped and draped in the usual sterile  fashion, skin anesthetized with lidocaine with epinephrine. Incision was  made over the previous biventricular ICD. Previous biventricular ICD was  removed through blunt dissection techniques. Pocket was irrigated with  triple-antibiotic flush. The leads were connected to a new [employer]  biventricular ICD, model Quadra Assura MP, serial number [id number]. The  leads and biventricular ICD were then placed in the pocket area.  Defibrillator threshold testing was performed. The patient was placed  into ventricular fibrillation  and was converted out with 25 joules'  energy.  Pocket was then closed with 2-0 Vicryl, followed by a next layer of 3-0  Vicryl, followed by a superficial layer of staples. The wound was  dressed. The patient was recovered from their sedation, transferred from  the lab in a stable condition.    IMPRESSION: Successful generator change of a [employer] biventricular  implantable cardioverter-defibrillator with successful defibrillator  threshold testing.

## 2019-03-25 ENCOUNTER — DOCUMENTATION (OUTPATIENT)
Dept: OTHER | Facility: OTHER | Age: 64
End: 2019-03-25

## 2019-03-25 NOTE — RESEARCH
"Spoke to patient via phone after he received the SOLVE CRT study materials I mailed for him to read.  He is not interested in enrolling at this time as he states \"it sounds like too much\".  I encouraged him to call if he changed his mind.  "

## 2019-03-27 ENCOUNTER — OFFICE VISIT (OUTPATIENT)
Dept: CARDIOLOGY | Facility: CLINIC | Age: 64
End: 2019-03-27

## 2019-03-27 DIAGNOSIS — I25.5 ISCHEMIC CARDIOMYOPATHY: Primary | ICD-10-CM

## 2019-03-27 PROCEDURE — 93284 PRGRMG EVAL IMPLANTABLE DFB: CPT | Performed by: INTERNAL MEDICINE

## 2019-03-27 PROCEDURE — 99024 POSTOP FOLLOW-UP VISIT: CPT | Performed by: INTERNAL MEDICINE

## 2019-03-27 NOTE — PROGRESS NOTES
2019    Reza Mcclelland, : 1955    WOUND CHECK      Patient has fever: [] YES   [x] NO     Temperature if indicated:       Wound Location:  Left shoulder      Dressing was:  Removed       Old Dressing Appearance:  Clean, dry        Wound Appearance:  Incision well-approximated with no signs or symptoms of infection        Gloves used, staples removed without diffuculty, wound cleansed with alcohol       Incision dresssed with triple antibiotic ointment, 4x4, and tegaderm with patient to remove in 3 days.  Verbal understanding from patient       Device was: Interrogated - Please see separate report        Plan:  Normal wound check      Appointment for follow-up scheduled for 3 months post procedure [x]    Future Appointments   Date Time Provider Department Center   2019 11:00 AM Andrea Guevara MD [facility] None   2019  9:00 AM Reza Lopes MD [facility] None           Janine Freitas RN, 19

## 2019-03-29 RX ORDER — CARVEDILOL 6.25 MG/1
TABLET ORAL
Qty: 180 TABLET | Refills: 3 | Status: SHIPPED | OUTPATIENT
Start: 2019-03-29 | End: 2019-03-29 | Stop reason: SDUPTHER

## 2019-03-29 RX ORDER — CARVEDILOL 6.25 MG/1
TABLET ORAL
Qty: 180 TABLET | Refills: 1 | Status: SHIPPED | OUTPATIENT
Start: 2019-03-29 | End: 2020-03-26

## 2019-04-03 RX ORDER — SPIRONOLACTONE 25 MG/1
TABLET ORAL
Qty: 90 TABLET | Refills: 3 | Status: SHIPPED | OUTPATIENT
Start: 2019-04-03 | End: 2020-05-19 | Stop reason: SDUPTHER

## 2019-04-06 ENCOUNTER — CLINICAL SUPPORT NO REQUIREMENTS (OUTPATIENT)
Dept: CARDIOLOGY | Facility: CLINIC | Age: 64
End: 2019-04-06

## 2019-07-02 ENCOUNTER — OFFICE VISIT (OUTPATIENT)
Dept: CARDIOLOGY | Facility: CLINIC | Age: 64
End: 2019-07-02

## 2019-07-02 VITALS
HEIGHT: 70 IN | BODY MASS INDEX: 26.11 KG/M2 | DIASTOLIC BLOOD PRESSURE: 68 MMHG | OXYGEN SATURATION: 93 % | HEART RATE: 70 BPM | WEIGHT: 182.4 LBS | SYSTOLIC BLOOD PRESSURE: 106 MMHG

## 2019-07-02 DIAGNOSIS — I50.22 CHRONIC SYSTOLIC CONGESTIVE HEART FAILURE (HCC): ICD-10-CM

## 2019-07-02 DIAGNOSIS — Z95.810 CARDIAC RESYNCHRONIZATION THERAPY DEFIBRILLATOR (CRT-D) IN PLACE: ICD-10-CM

## 2019-07-02 DIAGNOSIS — I48.0 PAROXYSMAL ATRIAL FIBRILLATION (HCC): Primary | ICD-10-CM

## 2019-07-02 DIAGNOSIS — Z79.899 LONG TERM CURRENT USE OF ANTIARRHYTHMIC MEDICAL THERAPY: ICD-10-CM

## 2019-07-02 PROCEDURE — 93284 PRGRMG EVAL IMPLANTABLE DFB: CPT | Performed by: INTERNAL MEDICINE

## 2019-07-02 PROCEDURE — 99214 OFFICE O/P EST MOD 30 MIN: CPT | Performed by: INTERNAL MEDICINE

## 2019-07-02 NOTE — PROGRESS NOTES
Reza Mcclelland  1955  PCP: Pearl Sabillon PA    SUBJECTIVE:   Reza Mcclelland is a 64 y.o. male seen for a consultation visit regarding the following:     Chief Complaint:   Chief Complaint   Patient presents with   • Atrial Fibrillation        HPI:  Patient has been cardiac stable. Had to stop Eliquis secondary to bleeding issues    History:  This is a 64-year-old patient followed by Dr. Andrea Guevara.  He has a history of extensive coronary artery disease and chronic systolic heart failure.  He has a previously implanted biventricular ICD.  He continues to have class II to class III angina symptoms. He has quit smoking      Cardiac PMH: (Old records have been reviewed and summarized below)  1. Ischemic heart disease/ischemic cardiomyopathy:  a. Remote non-ST-elevation myocardial infarction with emergent left heart catheterization and successful percutaneous transluminal coronary angioplasty and stenting of the ramus intermedius with a 3.0 x 12 mm Xience drug-eluting stent, 11/19/2008.  b. Left heart catheterization revealing severe stenosis in the LAD of about 80% to 90% in the mid portion with 2.5 x 28 mm, 3.0 x 28 mm, and 3.5 x 18 mm Xience drug-eluting stents in the mid and proximal portion of the LAD, critical left anterior descending stenosis with recommendations to return in 2 weeks for further intervention, 12/04/2008.  c. Left heart catheterization revealing critical right coronary artery stenosis with 3.5 x 28 mm Xience drug-eluting stent deployed in the mid RCA and Xience drug-eluting stent deployed in the proximal RCA, also revealing severe peripheral vascular disease with critical bilateral stenosis of common iliac artery with recommendations for the patient to remain on Plavix and aspirin and further evaluation of iliac stenosis, 12/18/2008.  d. Recurrent CCS class II-III angina with subsequent left heart catheterization and stenting with a 3.5 x 15 mm Xience drug-eluting stent to the  proximal LAD and 2.5 x 18 mm Xience drug-eluting stent into the distal LAD with widely patent stents in the RCA, 90% lesion in the stent of the LAD and widely patent stent of the diagonal branch. LVEF (0.65) with no wall motion abnormalities. Severe peripheral arterial disease with 60% stenosis of the left common iliac artery and 80% to 90% stenosis in the distal common femoral artery with recommendations for abdominal aortogram with right and left lower extremity arteriogram, 01/21/2011.  e. Cardiac catheterization with percutaneous transluminal angioplasty and self-expanding stent placement to the proximal innominate artery, distal embolic protection filter placement in the right carotid artery for cerebral protection during procedure, 10 x 20 mm Xact self-expanding stent deployed to the proximal innominate stenosis, total occlusion of the proximal left internal carotid artery, 11/29/2011.  f. Markedly reduced echocardiographic LVEF (0.25) with left ventricular chamber enlargement and thickened mitral valve leaflets with moderate MR in the absence of pericardial effusion or intracavitary thrombus/mass with markedly abnormal EKG demonstrating LBBB with marked QRS widening, spring 2013.  g. Recurrent progressive chest pain syndrome with severe single-vessel obstructive coronary disease and 100% total occlusion proximal right coronary artery at site of previously placed stent with mild nonobstructive plaque in the left coronary artery system and previously placed stents in the LAD, ramus intermedius artery patent with left ventriculography deferred and moderate bilateral common iliac disease, April 2013, with subsequent bi-ventricular AICD implant ([employer], model #[id number]) by Dr. Kong.  h. Acute non-ST elevation myocardial infarction/left heart catheterization, Dr. Lemus, with Xience drug-eluting stent to the ramus intermedius: Nonobstructive 50% to 60% mid to distal left anterior descending stenosis.  Chronic total occlusion of the dominant right coronary artery served by left-sided collaterals with left ventricular systolic dysfunction. EF 10% to 15%, 08/20/2014.  i. Biventricular ICD interrogation 7/7/17; RA pacing 72%, RV pacing 96% longevity 1.8 years, mode switch less than 1%, longest was 7 hours 5/9/17, 1 noise reversion both channels  j. Residual CCS class I angina pectoris/NYHA class II-III exertional dyspnea and fatigue.   k. Recent apparent acute congestive heart failure with non-STEMI and possible bronchopneumonia with respiratory failure requiring intubation and apparent diagnostic coronary angiography with 3 stents placed with subsequent apparent AMA discharge - data deficit, [facility], [place], [place], November 2017.  l. Residual CCS class I angina pectoris/NYHA class III-IV biventricular CHF, December 2017.  m. Residual class I symptoms, June 2018  2. Peripheral vascular disease:  a. Cardiac catheterization revealing total occlusion of left internal carotid artery previously in 2011.  b. CT angiogram of the neck showing total occlusion of the left internal carotid artery, 50% to 60% stenosis of the right carotid bulb, stent in the brachiocephalic artery placed by Dr. Santamaria in November 2011, totally occluded, March 2013.  c. Occluded left carotid artery with occluded innominate artery and intracranial circulation via thyroid artery collaterals and left vertebral with left axillary to right axillary bypass graft using 8 mm. PTFE ringed Propaten graft with arteriogram to the right subclavian artery and right axillary artery and right carotid artery, April 2013.  d. Remote hospitalization, [facility] [place], with congestive heart failure and stroke - data deficit (December 2017), with apparent acute injury noted.  3. Cerebrovascular accident and initiation of warfarin therapy, March 2013.  4. Atrial fibrillation on presentation to outside hospital converted  to sinus with Cardizem drip and subsequent recurrence of atrial fibrillation while in the ICU, treated with amiodarone and returned to sinus, August 2014.  5. Dyslipidemia.  6. Stage 3 chronic kidney disease.   7. Left bundle branch block, probably combined ischemic/nonischemic cardiomyopathy.  8. Remote acute-on-chronic respiratory failure with hospital admission for pneumonia on ventilator.  9. History of hypertension.  10. Tobacco abuse, previously resolved but recurrent, with cessation using Chantix, winter of 2015.  11. No prior additional surgical intervention.   12. Progressive erectile dysfunction.  13. Serial acceptable AICD interrogations: February 2014; August 2014; March 2015, July 2017, with home [facility] monitoring.  14. Hyperthyroidism with abnormal thyroid ultrasound documenting thyroid nodule-data deficit.  2017    Past Medical History, Past Surgical History, Family history, Social History, and Medications were all reviewed with the patient today and updated as necessary.       Current Outpatient Medications:   •  albuterol (PROVENTIL) (5 MG/ML) 0.5% nebulizer solution, Take 0.5 mL by nebulization Every 6 (Six) Hours As Needed for Wheezing., Disp: 1 mL, Rfl: 12  •  amiodarone (PACERONE) 200 MG tablet, Take 100 mg by mouth Daily., Disp: , Rfl:   •  aspirin 81 MG EC tablet, Take 81 mg by mouth Daily., Disp: , Rfl:   •  atorvastatin (LIPITOR) 80 MG tablet, Take 80 mg by mouth Every Night., Disp: , Rfl:   •  budesonide-formoterol (SYMBICORT) 160-4.5 MCG/ACT inhaler, Inhale 2 puffs 2 (Two) Times a Day., Disp: 1 inhaler, Rfl: 12  •  carvedilol (COREG) 6.25 MG tablet, take 1 tablet by mouth twice a day with food, Disp: 180 tablet, Rfl: 1  •  lisinopril (PRINIVIL,ZESTRIL) 2.5 MG tablet, Take 1 tablet by mouth Daily., Disp: 30 tablet, Rfl: 11  •  nitroglycerin (NITROSTAT) 0.4 MG SL tablet, 1 under the tongue as needed for angina, may repeat q5mins for up three doses (Patient taking differently: Place 0.4 mg  under the tongue Every 5 (Five) Minutes As Needed for Chest Pain. 1 under the tongue as needed for angina, may repeat q5mins for up three doses), Disp: 100 tablet, Rfl: 11  •  oxyCODONE-acetaminophen (PERCOCET) 5-325 MG per tablet, Take 1 tablet by mouth Every 6 (Six) Hours As Needed for Moderate Pain ., Disp: 5 tablet, Rfl: 0  •  potassium chloride (KLOR-CON) 20 MEQ packet, Take 20 mEq by mouth Daily., Disp: , Rfl:   •  spironolactone (ALDACTONE) 25 MG tablet, take 1 tablet once daily, Disp: 90 tablet, Rfl: 3  •  VENTOLIN  (90 BASE) MCG/ACT inhaler, Inhale 2 puffs Every 6 (Six) Hours As Needed., Disp: , Rfl: 0    No Known Allergies      Past Medical History:   Diagnosis Date   • Acute on chronic respiratory failure (CMS/HCC)     Recent acute-on-chronic respiratory failure with hospital admission for pneumonia on ventilator.   • AICD (automatic cardioverter/defibrillator) present    • Atrial fibrillation (CMS/HCC)     Atrial fibrillation on presentation to outside hospital converted to sinus with Cardizem drip and subsequent recurrence of atrial fibrillation while in the ICU, treated with amiodarone and returned to sinus, August 2014.   • CAD (coronary artery disease)    • Cerebrovascular accident (CMS/HCC)     Cerebrovascular accident and initiation of warfarin therapy, March 2013.   • Chronic kidney disease (CKD), stage III (moderate) (CMS/HCC)    • COPD (chronic obstructive pulmonary disease) (CMS/HCC)    • Dyslipidemia    • History of hypertension    • Ischemic cardiomyopathy    • Ischemic heart disease    • Left bundle branch block     Left bundle branch block, probably combined ischemic/nonischemic cardiomyopathy.   • Peripheral vascular disease (CMS/HCC)      Past Surgical History:   Procedure Laterality Date   • AXILLARY AXILLARY BYPASS     • CARDIAC DEFIBRILLATOR PLACEMENT     • CARDIAC ELECTROPHYSIOLOGY PROCEDURE N/A 3/13/2019    Procedure: BIV ICD generator change- [facility] in 4-6 weeks.;  Surgeon:  "Reza Lopes MD;  Location: [facility] INVASIVE LOCATION;  Service: Cardiology   • CAROTID STENT       Family History   Problem Relation Age of Onset   • Dementia Mother    • Prostate cancer Father    • Heart disease Father    • Cancer Brother         kidney   • No Known Problems Sister    • Heart failure Maternal Grandmother    • Heart disease Maternal Grandmother    • Heart attack Maternal Grandmother    • Heart disease Maternal Grandfather    • Heart attack Maternal Grandfather    • No Known Problems Paternal Grandmother    • Heart attack Paternal Grandfather    • No Known Problems Sister    • Heart attack Paternal Uncle    • Heart disease Paternal Uncle      Social History     Tobacco Use   • Smoking status: Former Smoker     Packs/day: 1.00     Years: 48.00     Pack years: 48.00     Types: Cigarettes     Last attempt to quit: 2017     Years since quittin.5   • Smokeless tobacco: Never Used   • Tobacco comment: Pt quit smoking 6-7 months ago   Substance Use Topics   • Alcohol use: No           PHYSICAL EXAM:   /68 (BP Location: Left arm, Patient Position: Sitting)   Pulse 70   Ht 177.8 cm (70\")   Wt 82.7 kg (182 lb 6.4 oz)   SpO2 93%   BMI 26.17 kg/m²      Wt Readings from Last 5 Encounters:   19 82.7 kg (182 lb 6.4 oz)   19 77.6 kg (171 lb 1.2 oz)   19 82.6 kg (182 lb)   18 80.3 kg (177 lb)   10/10/18 75.4 kg (166 lb 3.2 oz)     BP Readings from Last 5 Encounters:   19 106/68   19 102/72   19 100/60   18 98/68   10/10/18 105/68       General-Well Nourished, Well developed  Eyes - PERRLA  Neck- supple, No mass  CV- regular rate and rhythm, no MRG  Lung- clear bilaterally  Abd- soft, +BS  Musc/skel - Norm strength and range of motion  Skin- warm and dry  Neuro - Alert & Oriented x 3, appropriate mood.    Medical problems and test results were reviewed with the patient today.     Results for orders placed or performed in visit on 19 "   Comprehensive Metabolic Panel   Result Value Ref Range    Glucose 105 (H) 70 - 100 mg/dL    BUN 26 (H) 9 - 23 mg/dL    Creatinine 1.57 (H) 0.60 - 1.30 mg/dL    Sodium 136 132 - 146 mmol/L    Potassium 5.1 3.5 - 5.5 mmol/L    Chloride 105 99 - 109 mmol/L    CO2 26.0 20.0 - 31.0 mmol/L    Calcium 9.5 8.7 - 10.4 mg/dL    Total Protein 6.7 5.7 - 8.2 g/dL    Albumin 4.73 3.20 - 4.80 g/dL    ALT (SGPT) 17 7 - 40 U/L    AST (SGOT) 18 0 - 33 U/L    Alkaline Phosphatase 74 25 - 100 U/L    Total Bilirubin 1.4 (H) 0.3 - 1.2 mg/dL    eGFR Non African Amer 45 (L) >60 mL/min/1.73    Globulin 2.0 gm/dL    A/G Ratio 2.4 1.5 - 2.5 g/dL    BUN/Creatinine Ratio 16.6 7.0 - 25.0    Anion Gap 5.0 3.0 - 11.0 mmol/L   CBC Auto Differential   Result Value Ref Range    WBC 10.35 3.50 - 10.80 10*3/mm3    RBC 5.21 4.20 - 5.76 10*6/mm3    Hemoglobin 15.9 13.1 - 17.5 g/dL    Hematocrit 48.1 38.9 - 50.9 %    MCV 92.3 80.0 - 99.0 fL    MCH 30.5 27.0 - 31.0 pg    MCHC 33.1 32.0 - 36.0 g/dL    RDW 14.5 11.3 - 14.5 %    RDW-SD 49.2 37.0 - 54.0 fl    MPV 10.0 6.0 - 12.0 fL    Platelets 150 150 - 450 10*3/mm3    Neutrophil % 69.9 41.0 - 71.0 %    Lymphocyte % 20.2 (L) 24.0 - 44.0 %    Monocyte % 6.1 0.0 - 12.0 %    Eosinophil % 2.6 0.0 - 3.0 %    Basophil % 1.2 (H) 0.0 - 1.0 %    Immature Grans % 0.6 0.0 - 0.6 %    Neutrophils, Absolute 7.24 1.50 - 8.30 10*3/mm3    Lymphocytes, Absolute 2.09 0.60 - 4.80 10*3/mm3    Monocytes, Absolute 0.63 0.00 - 1.00 10*3/mm3    Eosinophils, Absolute 0.27 0.00 - 0.30 10*3/mm3    Basophils, Absolute 0.12 0.00 - 0.20 10*3/mm3    Immature Grans, Absolute 0.06 (H) 0.00 - 0.05 10*3/mm3    nRBC 0.0 0.0 - 0.0 /100 WBC         Lab Results   Component Value Date    CHOL 141 10/10/2018    HDL 37 (L) 10/10/2018     10/10/2018       EKG:  (EKG/Tracing has been independently visualized by me and summarized below)      ECG 12 Lead  Date/Time: 7/2/2019 9:12 AM  Performed by: Reza Lopes MD  Authorized by: Moses  Reza GONSALES MD   Previous ECG: no previous ECG available  Rhythm: sinus rhythm and paced  Rate: normal  BPM: 70  Pacing: dual chamber pacing  Clinical impression: abnormal EKG            ASSESSMENT and PLAN  1.  Chronic systolic heart failure-stable overall. Followed by Dr. Guevara  2.  Biventricular ICD-we'll monitor in device clinic. Post Gen Change in March 2019   3.  Atrial Fibrillation - Current burden is 0% - Because of bleeding had to stop Eliquis 2.5mg BID. Decrease Amio to 100mg qam. Stop at next visit if stable  4.  Use of Amio - Monitor, reduce dose.     Return in about 6 months (around 1/2/2020) for office visit and device check with [facility].            Reza Lopes M.D., F.A.C.C, F.H.R.S.  Cardiology/Electrophysiology  07/02/19  9:18 AM

## 2019-07-10 ENCOUNTER — OFFICE VISIT (OUTPATIENT)
Dept: CARDIOLOGY | Facility: CLINIC | Age: 64
End: 2019-07-10

## 2019-07-10 ENCOUNTER — LAB (OUTPATIENT)
Dept: LAB | Facility: HOSPITAL | Age: 64
End: 2019-07-10

## 2019-07-10 VITALS
SYSTOLIC BLOOD PRESSURE: 110 MMHG | HEIGHT: 70 IN | DIASTOLIC BLOOD PRESSURE: 70 MMHG | WEIGHT: 184.8 LBS | HEART RATE: 71 BPM | BODY MASS INDEX: 26.45 KG/M2

## 2019-07-10 DIAGNOSIS — I25.9 ISCHEMIC HEART DISEASE: ICD-10-CM

## 2019-07-10 DIAGNOSIS — E78.5 HYPERLIPIDEMIA LDL GOAL <70: ICD-10-CM

## 2019-07-10 DIAGNOSIS — E78.5 HYPERLIPIDEMIA LDL GOAL <70: Primary | ICD-10-CM

## 2019-07-10 DIAGNOSIS — N18.30 CHRONIC KIDNEY DISEASE (CKD), STAGE III (MODERATE) (HCC): ICD-10-CM

## 2019-07-10 DIAGNOSIS — J41.0 SIMPLE CHRONIC BRONCHITIS (HCC): ICD-10-CM

## 2019-07-10 DIAGNOSIS — I25.5 ISCHEMIC CARDIOMYOPATHY: ICD-10-CM

## 2019-07-10 DIAGNOSIS — I50.22 CHRONIC SYSTOLIC CONGESTIVE HEART FAILURE (HCC): ICD-10-CM

## 2019-07-10 LAB
ALBUMIN SERPL-MCNC: 4.4 G/DL (ref 3.5–5.2)
ALBUMIN/GLOB SERPL: 1.6 G/DL
ALP SERPL-CCNC: 70 U/L (ref 39–117)
ALT SERPL W P-5'-P-CCNC: 8 U/L (ref 1–41)
ANION GAP SERPL CALCULATED.3IONS-SCNC: 9.7 MMOL/L (ref 5–15)
AST SERPL-CCNC: 8 U/L (ref 1–40)
BILIRUB SERPL-MCNC: 0.4 MG/DL (ref 0.2–1.2)
BUN BLD-MCNC: 22 MG/DL (ref 8–23)
BUN/CREAT SERPL: 13.8 (ref 7–25)
CALCIUM SPEC-SCNC: 9.4 MG/DL (ref 8.6–10.5)
CHLORIDE SERPL-SCNC: 100 MMOL/L (ref 98–107)
CHOLEST SERPL-MCNC: 126 MG/DL (ref 0–200)
CO2 SERPL-SCNC: 27.3 MMOL/L (ref 22–29)
CREAT BLD-MCNC: 1.6 MG/DL (ref 0.76–1.27)
GFR SERPL CREATININE-BSD FRML MDRD: 44 ML/MIN/1.73
GLOBULIN UR ELPH-MCNC: 2.7 GM/DL
GLUCOSE BLD-MCNC: 99 MG/DL (ref 65–99)
HDLC SERPL-MCNC: 47 MG/DL (ref 40–60)
LDLC SERPL CALC-MCNC: 67 MG/DL (ref 0–100)
LDLC/HDLC SERPL: 1.43 {RATIO}
POTASSIUM BLD-SCNC: 4.9 MMOL/L (ref 3.5–5.2)
PROT SERPL-MCNC: 7.1 G/DL (ref 6–8.5)
SODIUM BLD-SCNC: 137 MMOL/L (ref 136–145)
TRIGL SERPL-MCNC: 59 MG/DL (ref 0–150)
VLDLC SERPL-MCNC: 11.8 MG/DL (ref 5–40)

## 2019-07-10 PROCEDURE — 36415 COLL VENOUS BLD VENIPUNCTURE: CPT

## 2019-07-10 PROCEDURE — 99214 OFFICE O/P EST MOD 30 MIN: CPT | Performed by: INTERNAL MEDICINE

## 2019-07-10 PROCEDURE — 80061 LIPID PANEL: CPT

## 2019-07-10 PROCEDURE — 80053 COMPREHEN METABOLIC PANEL: CPT

## 2019-07-10 RX ORDER — ALBUTEROL SULFATE 90 UG/1
2 AEROSOL, METERED RESPIRATORY (INHALATION) EVERY 6 HOURS PRN
Qty: 1 INHALER | Refills: 6 | Status: SHIPPED | OUTPATIENT
Start: 2019-07-10 | End: 2021-06-07 | Stop reason: SDUPTHER

## 2019-07-10 NOTE — PROGRESS NOTES
Subjective:     Encounter Date:07/10/2019    Patient ID: Reza Mcclelland is a 64 y.o. single white male, retired [employer] , from [place], [place].       PHYSICIAN: Rolanda Billingsley MD/EMILY Romo  NEUROLOGIST: Jonas Quiroz MD   CARDIOVASCULAR SURGEON: Guillaume Pruitt MD, [facility]  INTERVENTIONAL CARDIOLOGIST: Ismael Nguyen MD, [facility]/ Oliverio Lemus MD, [facility], [facility]   ELECTROPHYSIOLOGIST: Reza Lopes MD, [facility], [facility]: DAKOTA Peters  ENDOCRINOLOGIST: unknown    Chief Complaint:   Chief Complaint   Patient presents with   • Hyperlipidemia   • Atrial Fibrillation   • Coronary Artery Disease     Problem List:  1. Ischemic heart disease/ischemic cardiomyopathy:  a. Remote non-ST-elevation myocardial infarction with emergent left heart catheterization and successful percutaneous transluminal coronary angioplasty and stenting of the ramus intermedius with a 3.0 x 12 mm Xience drug-eluting stent, 11/19/2008.  b. Left heart catheterization revealing severe stenosis in the LAD of about 80% to 90% in the mid portion with 2.5 x 28 mm, 3.0 x 28 mm, and 3.5 x 18 mm Xience drug-eluting stents in the mid and proximal portion of the LAD, critical left anterior descending stenosis with recommendations to return in 2 weeks for further intervention, 12/04/2008.  c. Left heart catheterization revealing critical right coronary artery stenosis with 3.5 x 28 mm Xience drug-eluting stent deployed in the mid RCA and Xience drug-eluting stent deployed in the proximal RCA, also revealing severe peripheral vascular disease with critical bilateral stenosis of common iliac artery with recommendations for the patient to remain on Plavix and aspirin and further evaluation of iliac stenosis, 12/18/2008.  d. Recurrent CCS class II-III angina with subsequent left heart catheterization and stenting with a 3.5 x 15 mm Xience drug-eluting stent to the  proximal LAD and 2.5 x 18 mm Xience drug-eluting stent into the distal LAD with widely patent stents in the RCA, 90% lesion in the stent of the LAD and widely patent stent of the diagonal branch. LVEF (0.65) with no wall motion abnormalities. Severe peripheral arterial disease with 60% stenosis of the left common iliac artery and 80% to 90% stenosis in the distal common femoral artery with recommendations for abdominal aortogram with right and left lower extremity arteriogram, 01/21/2011.  e. Cardiac catheterization with percutaneous transluminal angioplasty and self-expanding stent placement to the proximal innominate artery, distal embolic protection filter placement in the right carotid artery for cerebral protection during procedure, 10 x 20 mm Xact self-expanding stent deployed to the proximal innominate stenosis, total occlusion of the proximal left internal carotid artery, 11/29/2011.  f. Markedly reduced echocardiographic LVEF (0.25) with left ventricular chamber enlargement and thickened mitral valve leaflets with moderate MR in the absence of pericardial effusion or intracavitary thrombus/mass with markedly abnormal EKG demonstrating LBBB with marked QRS widening, spring 2013.  g. Recurrent progressive chest pain syndrome with severe single-vessel obstructive coronary disease and 100% total occlusion proximal right coronary artery at site of previously placed stent with mild nonobstructive plaque in the left coronary artery system and previously placed stents in the LAD, ramus intermedius artery patent with left ventriculography deferred and moderate bilateral common iliac disease, April 2013, with subsequent bi-ventricular AICD implant ([employer], model #[id number]) by Dr. Kong.  h. Acute non-ST elevation myocardial infarction/left heart catheterization, Dr. Lemus, with Xience drug-eluting stent to the ramus intermedius: Nonobstructive 50% to 60% mid to distal left anterior descending stenosis.  Chronic total occlusion of the dominant right coronary artery served by left-sided collaterals with left ventricular systolic dysfunction. EF 10% to 15%, 08/20/2014.  i. Biventricular ICD interrogation 7/7/17; RA pacing 72%, RV pacing 96% longevity 1.8 years, mode switch less than 1%, longest was 7 hours 5/9/17, 1 noise reversion both channels  j. Residual CCS class I angina pectoris/NYHA class II-III exertional dyspnea and fatigue.   k. Remote apparent acute congestive heart failure with non-STEMI and possible bronchopneumonia with respiratory failure requiring intubation and apparent diagnostic coronary angiography with 3 stents placed with subsequent apparent AMA discharge - data deficit, [facility], [place], [place], November 2017.  l. Residual CCS class I angina pectoris/NYHA class III-IV biventricular CHF, December 2017.  m. Residual class I symptoms, June 2018, October 2018, July 2019  2. Peripheral vascular disease:  a. Cardiac catheterization revealing total occlusion of left internal carotid artery previously in 2011.  b. CT angiogram of the neck showing total occlusion of the left internal carotid artery, 50% to 60% stenosis of the right carotid bulb, stent in the brachiocephalic artery placed by Dr. Santamaria in November 2011, totally occluded, March 2013.  c. Occluded left carotid artery with occluded innominate artery and intracranial circulation via thyroid artery collaterals and left vertebral with left axillary to right axillary bypass graft using 8 mm. PTFE ringed Propaten graft with arteriogram to the right subclavian artery and right axillary artery and right carotid artery, April 2013.  d. Remote hospitalization, [facility], with congestive heart failure and stroke - data deficit (December 2017), with apparent acute injury noted.  3. Cerebrovascular accident and initiation of warfarin therapy, March 2013.  4. Atrial fibrillation on presentation to  outside hospital converted to sinus with Cardizem drip and subsequent recurrence of atrial fibrillation while in the ICU, treated with amiodarone and returned to sinus, August 2014.  5. Dyslipidemia.  6. Stage 3 chronic kidney disease.   7. Left bundle branch block, probably combined ischemic/nonischemic cardiomyopathy.  8. Remote acute-on-chronic respiratory failure with hospital admission for pneumonia on ventilator.  9. History of hypertension.  10. Tobacco abuse, previously resolved but recurrent, with cessation using Chantix, winter of 2015.  11. No prior additional surgical intervention.   12. Progressive erectile dysfunction.  13. Serial acceptable AICD interrogations: February 2014; August 2014; March 2015, July 2017, with home Merlin monitoring, with ICD generator change, March 2019  14. Hyperthyroidism with abnormal thyroid ultrasound documenting thyroid nodule-data deficit.  2017      No Known Allergies      Current Outpatient Medications:   •  albuterol (PROVENTIL) (5 MG/ML) 0.5% nebulizer solution, Take 0.5 mL by nebulization Every 6 (Six) Hours As Needed for Wheezing., Disp: 1 mL, Rfl: 12  •  amiodarone (PACERONE) 200 MG tablet, Take 100 mg by mouth Daily., Disp: , Rfl:   •  aspirin 81 MG EC tablet, Take 81 mg by mouth Daily., Disp: , Rfl:   •  atorvastatin (LIPITOR) 80 MG tablet, Take 80 mg by mouth Every Night., Disp: , Rfl:   •  budesonide-formoterol (SYMBICORT) 160-4.5 MCG/ACT inhaler, Inhale 2 puffs 2 (Two) Times a Day., Disp: 1 inhaler, Rfl: 12  •  carvedilol (COREG) 6.25 MG tablet, take 1 tablet by mouth twice a day with food, Disp: 180 tablet, Rfl: 1  •  lisinopril (PRINIVIL,ZESTRIL) 2.5 MG tablet, Take 1 tablet by mouth Daily., Disp: 30 tablet, Rfl: 11  •  nitroglycerin (NITROSTAT) 0.4 MG SL tablet, 1 under the tongue as needed for angina, may repeat q5mins for up three doses (Patient taking differently: Place 0.4 mg under the tongue Every 5 (Five) Minutes As Needed for Chest Pain. 1 under the  tongue as needed for angina, may repeat q5mins for up three doses), Disp: 100 tablet, Rfl: 11  •  oxyCODONE-acetaminophen (PERCOCET) 5-325 MG per tablet, Take 1 tablet by mouth Every 6 (Six) Hours As Needed for Moderate Pain ., Disp: 5 tablet, Rfl: 0  •  potassium chloride (KLOR-CON) 20 MEQ packet, Take 20 mEq by mouth Daily., Disp: , Rfl:   •  spironolactone (ALDACTONE) 25 MG tablet, take 1 tablet once daily, Disp: 90 tablet, Rfl: 3  •  VENTOLIN  (90 BASE) MCG/ACT inhaler, Inhale 2 puffs Every 6 (Six) Hours As Needed., Disp: , Rfl: 0     HISTORY OF PRESENT ILLNESS: Patient returns for followup after a 9-month hiatus.  He had a bi-V ICD generator change on 03/13/2019.  He says that he has done well, other than the heat bothers him.  He tries to get his work done early in the morning.  He notes that he had some chest pain on Memorial Day, and he took one nitroglycerin, which relieved the pain.  He can walk a long way and has no chest pain with this activity.  He continues to do his own yard work.  He occasionally feels a burning deep inside his right upper leg, and he says that if he drives for a long time, he will get numbness in his right foot, but he thinks this is caused by his wallet in his back pocket.  He had followup with Dr. Lopes on 07/02/2019, and his amiodarone dose was cut in half.  He has bought a house near [place] and has been going down there a lot.  He has not had labs drawn recently and does not have a followup scheduled with his healthcare provider.  The patient has not had anything to eat yet today, so we will have him go to the lab today for blood to be drawn.  Patient otherwise denies prolonged chest pain, severe shortness of breath, PND, edema, palpitations, syncope or presyncope at this time on limited activity.        Review of Systems   Respiratory: Positive for sputum production.    Endocrine: Positive for heat intolerance.   Hematologic/Lymphatic: Bruises/bleeds easily.  "  Skin: Positive for dry skin.      All other systems reviewed and otherwise negative.    Procedures       Objective:       Vitals:    07/10/19 1018 07/10/19 1019   BP: 105/73 110/70   BP Location: Left arm Left arm   Patient Position: Sitting Standing   Pulse: 70 71   Weight: 83.8 kg (184 lb 12.8 oz)    Height: 177.8 cm (70\")      Body mass index is 26.52 kg/m².   Last weight:  166 lbs.    Physical Exam   Constitutional: He is oriented to person, place, and time. He appears well-developed and well-nourished.   Neck: No JVD present. Carotid bruit is present (bilateral). No thyromegaly present.   Cardiovascular: Regular rhythm, S1 normal and S2 normal. Exam reveals no gallop, no S3 and no friction rub.   Murmur heard.   Medium-pitched early systolic murmur is present with a grade of 2/6 at the lower left sternal border.  Pulses:       Carotid pulses are 1+ on the right side, and 1+ on the left side.       Radial pulses are 1+ on the right side, and 1+ on the left side.        Femoral pulses are 1+ on the right side, and 1+ on the left side.       Popliteal pulses are 1+ on the right side, and 1+ on the left side.        Dorsalis pedis pulses are 1+ on the right side, and 1+ on the left side.        Posterior tibial pulses are 1+ on the right side, and 1+ on the left side.   Pulmonary/Chest: Effort normal. He has decreased breath sounds. He has no wheezes. He has no rhonchi. He has no rales.   Left precordial PCD site is nominal   Abdominal: Soft. He exhibits no mass. There is no hepatosplenomegaly. There is no tenderness. There is no guarding.   Bowel sounds audible x4   Musculoskeletal: Normal range of motion. He exhibits no edema.   Lymphadenopathy:     He has no cervical adenopathy.   Neurological: He is alert and oriented to person, place, and time.   Skin: Skin is warm, dry and intact. No rash noted.   Vitals reviewed.        Lab Review:   Lab Results   Component Value Date    GLUCOSE 105 (H) 03/12/2019    BUN " 26 (H) 03/12/2019    CREATININE 1.57 (H) 03/12/2019    EGFRIFNONA 45 (L) 03/12/2019    BCR 16.6 03/12/2019    CO2 26.0 03/12/2019    CALCIUM 9.5 03/12/2019    ALBUMIN 4.73 03/12/2019    AST 18 03/12/2019    ALT 17 03/12/2019       Lab Results   Component Value Date    WBC 10.35 03/12/2019    HGB 15.9 03/12/2019    HCT 48.1 03/12/2019    MCV 92.3 03/12/2019     03/12/2019       Lab Results   Component Value Date    HGBA1C 5.60 05/02/2018       Lab Results   Component Value Date    TSH 3.350 05/02/2018       Lab Results   Component Value Date    CHOL 141 10/10/2018    CHOL 101 05/02/2018     Lab Results   Component Value Date    TRIG 70 10/10/2018    TRIG 74 05/02/2018     Lab Results   Component Value Date    HDL 37 (L) 10/10/2018    HDL 38 (L) 05/02/2018     Lab Results   Component Value Date     10/10/2018    LDL 53 05/02/2018       Lab Results   Component Value Date    .0 (H) 10/10/2018           Assessment:   Overall continued acceptable course with no interim cardiopulmonary complaints with acceptable functional status. We will defer additional diagnostic or therapeutic intervention from a cardiac perspective at this time other than to reassess his renal function and lipid status.       Diagnosis Plan   1. Hyperlipidemia LDL goal <70  Comprehensive Metabolic Panel    Lipid Panel   2. Ischemic cardiomyopathy  Adult Transthoracic Echo Complete W/ Cont if Necessary Per Protocol   3. Ischemic heart disease  Adult Transthoracic Echo Complete W/ Cont if Necessary Per Protocol   4. Chronic systolic congestive heart failure (CMS/HCC)  No recurrent angina pectoris or CHF on current activity schedule; continue current treatment     5. Chronic kidney disease (CKD), stage III (moderate) (CMS/HCC)  Reassess GFR with CMP today   6. COPD  Continued avoidance of tobacco and use of bronchodilators recommended          Plan:         1. Patient to continue current medications and close follow up with the above  providers.  2. The following laboratory studies are ordered to be drawn today, since the patient is fasting:    A. CMP   B. FLP  3. Tentative cardiology follow up in December 2019 with same-day echocardiogram, or patient may return sooner PRN.    Transcribed by Jovanna Lo for Dr. Andrea Guevara at 10:40 AM on 07/10/2019    I, Andrea Guevara MD, [facility], personally performed the services described in this documentation as scribed by the above named individual in my presence, and it is both accurate and complete. At 12:53 PM on 07/10/2019

## 2019-08-02 RX ORDER — ATORVASTATIN CALCIUM 80 MG/1
TABLET, FILM COATED ORAL
Qty: 30 TABLET | Refills: 11 | Status: SHIPPED | OUTPATIENT
Start: 2019-08-02 | End: 2020-08-17 | Stop reason: SDUPTHER

## 2019-08-27 ENCOUNTER — TELEPHONE (OUTPATIENT)
Dept: CARDIOLOGY | Facility: CLINIC | Age: 64
End: 2019-08-27

## 2019-08-27 NOTE — TELEPHONE ENCOUNTER
Mr. Mcclelland is doing major construction on his house.  He says he may plug the Merlin box up when he has completed the work on his house, if that happens.

## 2019-11-21 ENCOUNTER — CLINICAL SUPPORT NO REQUIREMENTS (OUTPATIENT)
Dept: CARDIOLOGY | Facility: CLINIC | Age: 64
End: 2019-11-21

## 2019-11-21 DIAGNOSIS — I50.22 CHRONIC SYSTOLIC CONGESTIVE HEART FAILURE (HCC): ICD-10-CM

## 2019-11-21 PROCEDURE — 93296 REM INTERROG EVL PM/IDS: CPT | Performed by: INTERNAL MEDICINE

## 2019-11-21 PROCEDURE — 93295 DEV INTERROG REMOTE 1/2/MLT: CPT | Performed by: INTERNAL MEDICINE

## 2019-12-03 RX ORDER — AMIODARONE HYDROCHLORIDE 200 MG/1
TABLET ORAL
Qty: 90 TABLET | Refills: 2 | Status: SHIPPED | OUTPATIENT
Start: 2019-12-03 | End: 2020-05-19 | Stop reason: SDUPTHER

## 2020-01-22 ENCOUNTER — APPOINTMENT (OUTPATIENT)
Dept: CARDIOLOGY | Facility: HOSPITAL | Age: 65
End: 2020-01-22

## 2020-01-23 ENCOUNTER — TELEPHONE (OUTPATIENT)
Dept: CARDIOLOGY | Facility: CLINIC | Age: 65
End: 2020-01-23

## 2020-01-23 NOTE — TELEPHONE ENCOUNTER
Please have him go to [facility] CHF clinic tomorrow, continue efforts to obtain records, and work in in the next few weeks since he was unable to come to his scheduled appointment yesterday.  Does he have any idea what his echo showed?    Thanks

## 2020-01-23 NOTE — TELEPHONE ENCOUNTER
Pt has appt with DAKOTA Peters at 1:15 on 1/24/2020.    Pt called and notified of appt, he states he will be there. Pt did not know what ECHO showed.    I will have records scanned in for review when they are received.    Messaged scheduling about scheduling pt in next few weeks.

## 2020-01-23 NOTE — TELEPHONE ENCOUNTER
"Pt states he started having labored breathing and was sweating so he decided to go to hospital. He want to the closest hospital which was [facility] in [place], [place]. He was admitted on 1/20/20 Monday until 1/23 4pm. While he was in hospital they wanted to run tests (pt not sure which tests), but pt stated he told the doctors at the hospital he had a cardiologist and didn't want them to run any other tests. He did have an ECHO while he was admitted.     Pt states that he was SOB and the hospital gave him IV Lasix and removed \"10 pounds of fluid\".    Currently, patient states he feels tired. He states he has chest congestion and is coughing up white thick phlegm.     Denies SOB, chest pain/pressure, dizziness, swelling.      Vitals upon discharge:  Temp 97.8  HR 77  RR 18  BP 92/63    Pt states that he BP was lower at times, but did not have them recorded.      Records requested, will give to [facility] for review when they are received.    Would you like to work patient in within the next couple weeks?    Please advise.      "

## 2020-01-24 ENCOUNTER — OFFICE VISIT (OUTPATIENT)
Dept: CARDIOLOGY | Facility: HOSPITAL | Age: 65
End: 2020-01-24

## 2020-01-24 VITALS
SYSTOLIC BLOOD PRESSURE: 124 MMHG | OXYGEN SATURATION: 98 % | HEIGHT: 70 IN | WEIGHT: 183.31 LBS | BODY MASS INDEX: 26.24 KG/M2 | HEART RATE: 74 BPM | DIASTOLIC BLOOD PRESSURE: 69 MMHG | RESPIRATION RATE: 18 BRPM | TEMPERATURE: 98.3 F

## 2020-01-24 DIAGNOSIS — I25.9 ISCHEMIC HEART DISEASE: ICD-10-CM

## 2020-01-24 DIAGNOSIS — I50.23 ACUTE ON CHRONIC SYSTOLIC CONGESTIVE HEART FAILURE (HCC): Primary | ICD-10-CM

## 2020-01-24 DIAGNOSIS — I10 ESSENTIAL HYPERTENSION: ICD-10-CM

## 2020-01-24 LAB
ALBUMIN SERPL-MCNC: 4.4 G/DL (ref 3.5–5.2)
ALBUMIN/GLOB SERPL: 1.8 G/DL
ALP SERPL-CCNC: 67 U/L (ref 39–117)
ALT SERPL W P-5'-P-CCNC: 9 U/L (ref 1–41)
ANION GAP SERPL CALCULATED.3IONS-SCNC: 15.9 MMOL/L (ref 5–15)
AST SERPL-CCNC: 10 U/L (ref 1–40)
BILIRUB SERPL-MCNC: 1.1 MG/DL (ref 0.2–1.2)
BUN BLD-MCNC: 28 MG/DL (ref 8–23)
BUN/CREAT SERPL: 17.9 (ref 7–25)
CALCIUM SPEC-SCNC: 9.3 MG/DL (ref 8.6–10.5)
CHLORIDE SERPL-SCNC: 95 MMOL/L (ref 98–107)
CO2 SERPL-SCNC: 24.1 MMOL/L (ref 22–29)
CREAT BLD-MCNC: 1.56 MG/DL (ref 0.76–1.27)
GFR SERPL CREATININE-BSD FRML MDRD: 45 ML/MIN/1.73
GLOBULIN UR ELPH-MCNC: 2.4 GM/DL
GLUCOSE BLD-MCNC: 101 MG/DL (ref 65–99)
NT-PROBNP SERPL-MCNC: 3694 PG/ML (ref 5–900)
POTASSIUM BLD-SCNC: 4.2 MMOL/L (ref 3.5–5.2)
PROT SERPL-MCNC: 6.8 G/DL (ref 6–8.5)
SODIUM BLD-SCNC: 135 MMOL/L (ref 136–145)

## 2020-01-24 PROCEDURE — 83880 ASSAY OF NATRIURETIC PEPTIDE: CPT | Performed by: NURSE PRACTITIONER

## 2020-01-24 PROCEDURE — 99214 OFFICE O/P EST MOD 30 MIN: CPT | Performed by: NURSE PRACTITIONER

## 2020-01-24 PROCEDURE — 80053 COMPREHEN METABOLIC PANEL: CPT | Performed by: NURSE PRACTITIONER

## 2020-01-24 RX ORDER — FUROSEMIDE 20 MG/1
20 TABLET ORAL DAILY
Qty: 30 TABLET | Refills: 3 | Status: ON HOLD | OUTPATIENT
Start: 2020-01-24 | End: 2020-02-13 | Stop reason: SDUPTHER

## 2020-01-24 NOTE — PROGRESS NOTES
[facility]  Heart and Valve Center      Encounter Date:01/24/2020     Reza Mcclelland  [address] [address]  [unfilled]    1955    Pearl Sabillon PA    Reza Mcclelland is a 64 y.o. male.      Subjective:     Chief Complaint:  Follow-up (heart failure)       HPI     64-year-old male with a history of ischemic cardiomyopathy, peripheral vascular disease, atrial fibrillation, CVA currently on warfarin, dyslipidemia, stage III chronic kidney disease, left bundle branch block, hypertension, remote tobacco abuse.  Patient with a biventricular ICD.      Patient reports he was hospitalized at [place], [facility] on 1/22/2020 and had 10 pounds of fluid off.  Dr. Guevara referred patient to the heart valve center for evaluation.  Per review of medical records patient was treated for flash pulmonary edema and acute respiratory distress.  He was initially required BiPAP and diuresed 4.5 L patient was discharged on carvedilol, Entresto, furosemide 20 mg daily. Pt reports he is not taking those meds, but he does not have med bottles or med list today. Echocardiogram on 1/22/2020 revealed severe dilated LV with EF 10 to 15%, aortic root mildly dilated 4.1, ascending aorta mildly dilated 3.8    Pt reports dyspnea is back to baseline.  No CP, pressure, dizziness, syncope, edema.  No PND/orthopnea.        Patient Active Problem List    Diagnosis Date Noted   • Ischemic heart disease 06/06/2018   • Dyslipidemia 06/06/2018   • Chronic systolic congestive heart failure (CMS/HCC) 12/16/2017     Note Last Updated: 1/24/2020     · Echo (12/15/17): LVEF 30%.  Severe LV dilatation.  Mild to moderate LA dilatation.  Artery to severe MR.  · Echocardiogram 1/22/2020 ([facility]): EF 10 to 15%, diffuse hypokinesis, severely dilated LV, moderate LA dilation, aortic root mildly dilated 4.1, a sending aorta mildly dilated 3.8 cm     • Leukocytosis 12/16/2017   • Silent  aspiration 12/16/2017   • COPD 12/15/2017   • Cardiac resynchronization therapy defibrillator (CRT-D) in place 12/15/2017   • Carotid occlusion, left 12/15/2017   • Coronary artery disease involving native coronary artery of native heart without angina pectoris      Note Last Updated: 12/17/2017     · Cardiac catheterization for NSTEMI (11/19/2008): PCI of ramus a 3.0 x 12 mm Xience drug-eluting stent, 11/19/2008.  · Cardiac catheterization (12/4/2008): PCI to mid and proximal LAD.  Sure staged PCI of RCA planned  · Cardiac catheterization (12/18/2008): PCI to RCA.    · Cardiac catheterization for recurrent CCS class II-III angina  (01/21/2011): NACHO to proximal and distal LAD.  Widely patent stents in RCA.  LVEF normal at 65%.    · Echo (2013): LVEF 25%.  Moderate MR.  Left bundle branch block. .  · Cardiac catheterization for recurrent angina (4/2013): Stable CAD with RCA occlusion and patent stents in the left coronary system.  · Cardiac cath for NSTEMI  (08/20/2014): NACHO to the ramus intermedius:  Nonobstructive 50% to 60% mid to distal left anterior descending stenosis. Chronic total occlusion of the dominant right coronary artery served by left-sided collaterals with left ventricular systolic dysfunction. LVEF 15%.     • Ischemic cardiomyopathy      Note Last Updated: 1/24/2020     · Echo (spring 2013): Acutely reduced LVEF 25%.  Moderate MR.  Left bundle branch block. .  · CRT-D implant ([employer], model #[id number]) by Dr. Kong.  · LVEF 10% to 15% noted on cardiac cath 08/20/2014.  · Non-STEMI with drug-eluting stents ([facility]) 11/2017  · Echo (12/15/17): Mild AI & TR. LVEF 30%.      • Peripheral vascular disease (CMS/HCC)      Note Last Updated: 12/16/2017     · Cardiac catheterization revealing total occlusion of left internal carotid artery previously in 2011.  · CT angiogram of the neck showing total occlusion of the left internal carotid artery, 50% to 60% stenosis of the right  carotid bulb, stent in the brachiocephalic artery placed by Dr. Santamaria in November 2011, totally occluded, March 2013.  · Occluded left carotid artery with occluded innominate artery and intracranial circulation via thyroid artery collaterals and left vertebral with left axillary to right axillary bypass graft using 8 mm. PTFE ringed Propaten graft with arteriogram to the right subclavian artery and right axillary artery and right carotid artery, April 2013.         • Cerebrovascular accident (CMS/HCC)      Note Last Updated: 12/16/2017     · Cerebrovascular accident and initiation of warfarin therapy, March 2013.     • Paroxysmal atrial fibrillation (CMS/HCC)      Note Last Updated: 12/16/2017     · Atrial fibrillation on presentation to outside hospital converted to sinus with Cardizem drip and subsequent recurrence of atrial fibrillation while in the ICU, treated with amiodarone and returned to sinus, August 2014.  · Chads Vasc=6, on Eliquis     • Hyperlipidemia LDL goal <70    • Chronic kidney disease (CKD), stage III (moderate) (CMS/HCC)    • Left bundle branch block    • Essential hypertension          Past Surgical History:   Procedure Laterality Date   • AXILLARY AXILLARY BYPASS     • CARDIAC DEFIBRILLATOR PLACEMENT     • CARDIAC ELECTROPHYSIOLOGY PROCEDURE N/A 3/13/2019    Procedure: BIV ICD generator change- [facility] in 4-6 weeks.;  Surgeon: Reza Lopes MD;  Location: [facility] INVASIVE LOCATION;  Service: Cardiology   • CAROTID STENT     • INSERT / REPLACE / REMOVE PACEMAKER         No Known Allergies      Current Outpatient Medications:   •  albuterol (PROVENTIL) (5 MG/ML) 0.5% nebulizer solution, Take 0.5 mL by nebulization Every 6 (Six) Hours As Needed for Wheezing., Disp: 1 mL, Rfl: 12  •  amiodarone (PACERONE) 200 MG tablet, take 1 tablet by mouth once daily, Disp: 90 tablet, Rfl: 2  •  aspirin 81 MG EC tablet, Take 81 mg by mouth Daily., Disp: , Rfl:   •  atorvastatin (LIPITOR) 80 MG tablet, take 1  "tablet by mouth at bedtime, Disp: 30 tablet, Rfl: 11  •  budesonide-formoterol (SYMBICORT) 160-4.5 MCG/ACT inhaler, Inhale 2 puffs 2 (Two) Times a Day., Disp: 1 inhaler, Rfl: 12  •  carvedilol (COREG) 6.25 MG tablet, take 1 tablet by mouth twice a day with food, Disp: 180 tablet, Rfl: 1  •  lisinopril (PRINIVIL,ZESTRIL) 2.5 MG tablet, Take 1 tablet by mouth Daily., Disp: 30 tablet, Rfl: 11  •  nitroglycerin (NITROSTAT) 0.4 MG SL tablet, 1 under the tongue as needed for angina, may repeat q5mins for up three doses (Patient taking differently: Place 0.4 mg under the tongue Every 5 (Five) Minutes As Needed for Chest Pain. 1 under the tongue as needed for angina, may repeat q5mins for up three doses), Disp: 100 tablet, Rfl: 11  •  potassium chloride (KLOR-CON) 20 MEQ packet, Take 20 mEq by mouth Daily., Disp: , Rfl:   •  spironolactone (ALDACTONE) 25 MG tablet, take 1 tablet once daily, Disp: 90 tablet, Rfl: 3  •  VENTOLIN  (90 Base) MCG/ACT inhaler, Inhale 2 puffs Every 6 (Six) Hours As Needed for Wheezing., Disp: 1 inhaler, Rfl: 6  •  furosemide (LASIX) 20 MG tablet, Take 1 tablet by mouth Daily., Disp: 30 tablet, Rfl: 3    The following portions of the patient's history were reviewed and updated today during office visit as appropriate: allergies, current medications, past family history, past medical history, past social history, past surgical history and problem list.    Review of Systems   Respiratory: Positive for shortness of breath.    All other systems reviewed and are negative.      Objective:     Vitals:    01/24/20 1313   BP: 124/69   BP Location: Left arm   Patient Position: Sitting   Cuff Size: Adult   Pulse: 74   Resp: 18   Temp: 98.3 °F (36.8 °C)   TempSrc: Temporal   SpO2: 98%   Weight: 83.2 kg (183 lb 5 oz)   Height: 177.8 cm (70\")         Physical Exam   Constitutional: He is oriented to person, place, and time. He appears well-developed and well-nourished. No distress.   HENT:   Mouth/Throat: " Oropharynx is clear and moist.   Eyes: No scleral icterus.   Neck: No hepatojugular reflux and no JVD present. Carotid bruit is not present.   Cardiovascular: Normal rate, regular rhythm, normal heart sounds and intact distal pulses. Exam reveals no friction rub.   No murmur heard.  Pulmonary/Chest: Effort normal and breath sounds normal.   Abdominal: Soft. Bowel sounds are normal. He exhibits no distension. There is no tenderness.   Musculoskeletal: He exhibits no edema.   Neurological: He is alert and oriented to person, place, and time.   Skin: Skin is warm, dry and intact. No rash noted. No cyanosis or erythema. No pallor.   Psychiatric: He has a normal mood and affect. His behavior is normal. Thought content normal.   Vitals reviewed.      Lab and Diagnostic Review:  11/21/19 home remote review  Biventricular ICD Remote  Scheduled Transmission  Dx: Congestive heart failure  Battery Longevity: 4yrs 3mo  Chg Time: 9.1 secs.  AP 95 %  BVP >99 %  Mode Switch: <1 % 3hrs 50minutes longest episode on 9/17/2019  Episodes: No VHR      echo 12/15/17:  EF 30%, mod-severe MR,      Echocardiogram on 1/22/2020 revealed severe dilated LV with EF 10 to 15%, aortic root mildly dilated 4.1, ascending aorta mildly dilated 3.8      Assessment and Plan:         1. Acute on chronic systolic congestive heart failure (CMS/HCC)    - furosemide (LASIX) 20 MG tablet; Take 1 tablet by mouth Daily.  Dispense: 30 tablet; Refill: 3    - Comprehensive Metabolic Panel; today  - proBNP;  - Stress Test With Pet Myocardial Perfusion (Multi Study);next week      - Basic Metabolic Panel; 2 weeks    2. Ischemic heart disease  Depressed EF 10-15% (30% 2017)  Hx of NSTEMI with stents    - Stress Test With Pet Myocardial Perfusion (Multi Study); Future    3. Essential hypertension  Ace, coreg, aldactone    4. PAF  On amio  Pt stopped taking Eliquis in the past due to bruising and low Phoenix of afib  Discuss stroke risk.  Pt refused restarting  Eliquis.     F/u with [facility] next months.  [facility]u [facility] 3 months or sooner if needed        *Please note that portions of this note were completed with a voice recognition program. Efforts were made to edit the dictations, but occasionally words are mistranscribed.

## 2020-01-27 RX ORDER — POTASSIUM CHLORIDE 20 MEQ/1
TABLET, EXTENDED RELEASE ORAL
Qty: 30 TABLET | Refills: 5 | Status: ON HOLD | OUTPATIENT
Start: 2020-01-27 | End: 2022-11-12

## 2020-01-27 RX ORDER — BUDESONIDE AND FORMOTEROL FUMARATE DIHYDRATE 160; 4.5 UG/1; UG/1
2 AEROSOL RESPIRATORY (INHALATION) 2 TIMES DAILY
Qty: 10.2 G | Refills: 0 | Status: SHIPPED | OUTPATIENT
Start: 2020-01-27 | End: 2020-06-29

## 2020-01-28 ENCOUNTER — DOCUMENTATION (OUTPATIENT)
Dept: CARDIOLOGY | Facility: HOSPITAL | Age: 65
End: 2020-01-28

## 2020-01-28 ENCOUNTER — TELEPHONE (OUTPATIENT)
Dept: CARDIOLOGY | Facility: HOSPITAL | Age: 65
End: 2020-01-28

## 2020-01-28 NOTE — TELEPHONE ENCOUNTER
Called and reviewed lab results    Office Visit on 01/24/2020   Component Date Value Ref Range Status   • Glucose 01/24/2020 101* 65 - 99 mg/dL Final   • BUN 01/24/2020 28* 8 - 23 mg/dL Final   • Creatinine 01/24/2020 1.56* 0.76 - 1.27 mg/dL Final   • Sodium 01/24/2020 135* 136 - 145 mmol/L Final   • Potassium 01/24/2020 4.2  3.5 - 5.2 mmol/L Final   • Chloride 01/24/2020 95* 98 - 107 mmol/L Final   • CO2 01/24/2020 24.1  22.0 - 29.0 mmol/L Final   • Calcium 01/24/2020 9.3  8.6 - 10.5 mg/dL Final   • Total Protein 01/24/2020 6.8  6.0 - 8.5 g/dL Final   • Albumin 01/24/2020 4.40  3.50 - 5.20 g/dL Final   • ALT (SGPT) 01/24/2020 9  1 - 41 U/L Final   • AST (SGOT) 01/24/2020 10  1 - 40 U/L Final   • Alkaline Phosphatase 01/24/2020 67  39 - 117 U/L Final   • Total Bilirubin 01/24/2020 1.1  0.2 - 1.2 mg/dL Final   • eGFR Non African Amer 01/24/2020 45* >60 mL/min/1.73 Final   • Globulin 01/24/2020 2.4  gm/dL Final   • A/G Ratio 01/24/2020 1.8  g/dL Final   • BUN/Creatinine Ratio 01/24/2020 17.9  7.0 - 25.0 Final   • Anion Gap 01/24/2020 15.9* 5.0 - 15.0 mmol/L Final   • proBNP 01/24/2020 3,694.0* 5.0 - 900.0 pg/mL Final     Pt report dyspnea has improved with Lasix.  He reports he is not taking every day.  Doesn't feel it is needed every day.  Instructed to day daily with potassium supplement and repeat labs in 2 weeks.     Reviewed s/s HF worsening    Pt is waiting for stress test to be scheduled.

## 2020-01-28 NOTE — PROGRESS NOTES
Received lab results completed at [facility] ED visit on 1/22/2024 reviewed    Sodium 134, potassium 3.9, chloride 97, creatinine 2.0, estimated GFR 33    WBC 12.8, hemoglobin 15.9, hematocrit 46.8, platelet 234

## 2020-02-05 ENCOUNTER — HOSPITAL ENCOUNTER (OUTPATIENT)
Dept: CARDIOLOGY | Facility: HOSPITAL | Age: 65
Discharge: HOME | End: 2020-02-05
Admitting: NURSE PRACTITIONER

## 2020-02-05 ENCOUNTER — TELEPHONE (OUTPATIENT)
Dept: CARDIOLOGY | Facility: CLINIC | Age: 65
End: 2020-02-05

## 2020-02-05 VITALS — WEIGHT: 183.42 LBS | BODY MASS INDEX: 26.26 KG/M2 | HEIGHT: 70 IN

## 2020-02-05 DIAGNOSIS — I50.23 ACUTE ON CHRONIC SYSTOLIC CONGESTIVE HEART FAILURE (HCC): ICD-10-CM

## 2020-02-05 DIAGNOSIS — I25.9 ISCHEMIC HEART DISEASE: ICD-10-CM

## 2020-02-05 DIAGNOSIS — R94.39 ABNORMAL STRESS TEST: Primary | ICD-10-CM

## 2020-02-05 LAB
BH CV NUCLEAR PRIOR STUDY: 3
BH CV STRESS BP STAGE 1: NORMAL
BH CV STRESS BP STAGE 3: NORMAL
BH CV STRESS COMMENTS STAGE 1: NORMAL
BH CV STRESS DOSE REGADENOSON STAGE 1: 0.4
BH CV STRESS DURATION MIN STAGE 1: 1
BH CV STRESS DURATION MIN STAGE 2: 1
BH CV STRESS DURATION MIN STAGE 3: 1
BH CV STRESS DURATION MIN STAGE 4: 1
BH CV STRESS DURATION SEC STAGE 1: 0
BH CV STRESS DURATION SEC STAGE 2: 0
BH CV STRESS DURATION SEC STAGE 3: 0
BH CV STRESS DURATION SEC STAGE 4: 0
BH CV STRESS HR STAGE 1: 73
BH CV STRESS HR STAGE 2: 73
BH CV STRESS HR STAGE 3: 73
BH CV STRESS HR STAGE 4: 73
BH CV STRESS O2 STAGE 1: 94
BH CV STRESS O2 STAGE 2: 100
BH CV STRESS O2 STAGE 3: 100
BH CV STRESS O2 STAGE 4: 98
BH CV STRESS PROTOCOL 1: NORMAL
BH CV STRESS RECOVERY BP: NORMAL MMHG
BH CV STRESS RECOVERY HR: 71 BPM
BH CV STRESS RECOVERY O2: 98 %
BH CV STRESS STAGE 1: 1
BH CV STRESS STAGE 2: 2
BH CV STRESS STAGE 3: 3
BH CV STRESS STAGE 4: 4
LV EF NUC BP: 10 %
MAXIMAL PREDICTED HEART RATE: 156 BPM
PERCENT MAX PREDICTED HR: 46.79 %
STRESS BASELINE BP: NORMAL MMHG
STRESS BASELINE HR: 71 BPM
STRESS O2 SAT REST: 96 %
STRESS PERCENT HR: 55 %
STRESS POST ESTIMATED WORKLOAD: 1 METS
STRESS POST EXERCISE DUR MIN: 4 MIN
STRESS POST EXERCISE DUR SEC: 0 SEC
STRESS POST PEAK BP: NORMAL MMHG
STRESS POST PEAK HR: 73 BPM
STRESS TARGET HR: 133 BPM

## 2020-02-05 PROCEDURE — 0 RUBIDIUM CHLORIDE: Performed by: NURSE PRACTITIONER

## 2020-02-05 PROCEDURE — 78492 MYOCRD IMG PET MLT RST&STRS: CPT | Performed by: INTERNAL MEDICINE

## 2020-02-05 PROCEDURE — 93017 CV STRESS TEST TRACING ONLY: CPT

## 2020-02-05 PROCEDURE — A9555 RB82 RUBIDIUM: HCPCS | Performed by: NURSE PRACTITIONER

## 2020-02-05 PROCEDURE — 93018 CV STRESS TEST I&R ONLY: CPT | Performed by: INTERNAL MEDICINE

## 2020-02-05 PROCEDURE — 25010000002 REGADENOSON 0.4 MG/5ML SOLUTION: Performed by: NURSE PRACTITIONER

## 2020-02-05 PROCEDURE — 78492 MYOCRD IMG PET MLT RST&STRS: CPT

## 2020-02-05 RX ADMIN — RUBIDIUM CHLORIDE RB-82 1 DOSE: 150 INJECTION, SOLUTION INTRAVENOUS at 12:30

## 2020-02-05 RX ADMIN — RUBIDIUM CHLORIDE RB-82 1 DOSE: 150 INJECTION, SOLUTION INTRAVENOUS at 12:10

## 2020-02-05 RX ADMIN — REGADENOSON 0.4 MG: 0.08 INJECTION, SOLUTION INTRAVENOUS at 12:32

## 2020-02-05 NOTE — TELEPHONE ENCOUNTER
Called pt and advised pt per [facility] he needed to have LHC due to abnormal ST.     Advised pt that scheduling would be calling to schedule LHC.    Pt verbalizes understanding and agreeable to plan.

## 2020-02-06 ENCOUNTER — TELEPHONE (OUTPATIENT)
Dept: CARDIOLOGY | Facility: CLINIC | Age: 65
End: 2020-02-06

## 2020-02-06 ENCOUNTER — PREP FOR SURGERY (OUTPATIENT)
Dept: OTHER | Facility: HOSPITAL | Age: 65
End: 2020-02-06

## 2020-02-06 DIAGNOSIS — R94.39 ABNORMAL STRESS TEST: Primary | ICD-10-CM

## 2020-02-06 RX ORDER — NITROGLYCERIN 0.4 MG/1
0.4 TABLET SUBLINGUAL
Status: CANCELLED | OUTPATIENT
Start: 2020-02-06

## 2020-02-06 RX ORDER — ASPIRIN 325 MG
325 TABLET, DELAYED RELEASE (ENTERIC COATED) ORAL DAILY
Status: CANCELLED | OUTPATIENT
Start: 2020-02-07

## 2020-02-06 RX ORDER — ASPIRIN 325 MG
325 TABLET ORAL ONCE
Status: CANCELLED | OUTPATIENT
Start: 2020-02-06 | End: 2020-02-06

## 2020-02-06 RX ORDER — SODIUM CHLORIDE 0.9 % (FLUSH) 0.9 %
10 SYRINGE (ML) INJECTION AS NEEDED
Status: CANCELLED | OUTPATIENT
Start: 2020-02-06

## 2020-02-06 RX ORDER — SODIUM CHLORIDE 0.9 % (FLUSH) 0.9 %
3 SYRINGE (ML) INJECTION EVERY 12 HOURS SCHEDULED
Status: CANCELLED | OUTPATIENT
Start: 2020-02-06

## 2020-02-06 RX ORDER — ONDANSETRON 2 MG/ML
4 INJECTION INTRAMUSCULAR; INTRAVENOUS EVERY 8 HOURS PRN
Status: CANCELLED | OUTPATIENT
Start: 2020-02-06

## 2020-02-06 NOTE — TELEPHONE ENCOUNTER
Hannahkatelny Herrera, patient's caregiver, requests that [place] paper work be filled out for her.     Advised Hannah to bring forms to office and that there was a $25 fee to have them filled out.     Hannah verbalizes understanding and agreeable to plan.

## 2020-02-12 ENCOUNTER — HOSPITAL ENCOUNTER (OUTPATIENT)
Facility: HOSPITAL | Age: 65
Discharge: HOME | End: 2020-02-13
Attending: INTERNAL MEDICINE | Admitting: INTERNAL MEDICINE

## 2020-02-12 DIAGNOSIS — R94.39 ABNORMAL STRESS TEST: ICD-10-CM

## 2020-02-12 DIAGNOSIS — I25.10 CORONARY ARTERY DISEASE INVOLVING NATIVE CORONARY ARTERY OF NATIVE HEART WITHOUT ANGINA PECTORIS: Primary | ICD-10-CM

## 2020-02-12 DIAGNOSIS — I50.23 ACUTE ON CHRONIC SYSTOLIC CONGESTIVE HEART FAILURE (HCC): ICD-10-CM

## 2020-02-12 LAB
ALBUMIN SERPL-MCNC: 4.3 G/DL (ref 3.5–5.2)
ALBUMIN/GLOB SERPL: 1.7 G/DL
ALP SERPL-CCNC: 69 U/L (ref 39–117)
ALT SERPL W P-5'-P-CCNC: 10 U/L (ref 1–41)
ANION GAP SERPL CALCULATED.3IONS-SCNC: 8 MMOL/L (ref 5–15)
AST SERPL-CCNC: 13 U/L (ref 1–40)
BILIRUB SERPL-MCNC: 0.6 MG/DL (ref 0.2–1.2)
BUN BLD-MCNC: 32 MG/DL (ref 8–23)
BUN/CREAT SERPL: 19.5 (ref 7–25)
CALCIUM SPEC-SCNC: 9 MG/DL (ref 8.6–10.5)
CHLORIDE SERPL-SCNC: 105 MMOL/L (ref 98–107)
CHOLEST SERPL-MCNC: 99 MG/DL (ref 0–200)
CO2 SERPL-SCNC: 26 MMOL/L (ref 22–29)
CREAT BLD-MCNC: 1.64 MG/DL (ref 0.76–1.27)
DEPRECATED RDW RBC AUTO: 44 FL (ref 37–54)
ERYTHROCYTE [DISTWIDTH] IN BLOOD BY AUTOMATED COUNT: 13 % (ref 12.3–15.4)
GFR SERPL CREATININE-BSD FRML MDRD: 43 ML/MIN/1.73
GLOBULIN UR ELPH-MCNC: 2.6 GM/DL
GLUCOSE BLD-MCNC: 112 MG/DL (ref 65–99)
HBA1C MFR BLD: 5.7 % (ref 4.8–5.6)
HCT VFR BLD AUTO: 45.6 % (ref 37.5–51)
HDLC SERPL-MCNC: 44 MG/DL (ref 40–60)
HGB BLD-MCNC: 15.1 G/DL (ref 13–17.7)
LDLC SERPL CALC-MCNC: 40 MG/DL (ref 0–100)
LDLC/HDLC SERPL: 0.92 {RATIO}
MCH RBC QN AUTO: 30.8 PG (ref 26.6–33)
MCHC RBC AUTO-ENTMCNC: 33.1 G/DL (ref 31.5–35.7)
MCV RBC AUTO: 93.1 FL (ref 79–97)
PLATELET # BLD AUTO: 153 10*3/MM3 (ref 140–450)
PMV BLD AUTO: 9.7 FL (ref 6–12)
POTASSIUM BLD-SCNC: 4.6 MMOL/L (ref 3.5–5.2)
PROT SERPL-MCNC: 6.9 G/DL (ref 6–8.5)
RBC # BLD AUTO: 4.9 10*6/MM3 (ref 4.14–5.8)
SODIUM BLD-SCNC: 139 MMOL/L (ref 136–145)
TRIGL SERPL-MCNC: 73 MG/DL (ref 0–150)
VLDLC SERPL-MCNC: 14.6 MG/DL
WBC NRBC COR # BLD: 8.99 10*3/MM3 (ref 3.4–10.8)

## 2020-02-12 PROCEDURE — C1725 CATH, TRANSLUMIN NON-LASER: HCPCS | Performed by: INTERNAL MEDICINE

## 2020-02-12 PROCEDURE — 36415 COLL VENOUS BLD VENIPUNCTURE: CPT

## 2020-02-12 PROCEDURE — C1874 STENT, COATED/COV W/DEL SYS: HCPCS | Performed by: INTERNAL MEDICINE

## 2020-02-12 PROCEDURE — 93458 L HRT ARTERY/VENTRICLE ANGIO: CPT | Performed by: INTERNAL MEDICINE

## 2020-02-12 PROCEDURE — 93005 ELECTROCARDIOGRAM TRACING: CPT | Performed by: INTERNAL MEDICINE

## 2020-02-12 PROCEDURE — C1887 CATHETER, GUIDING: HCPCS | Performed by: INTERNAL MEDICINE

## 2020-02-12 PROCEDURE — 85347 COAGULATION TIME ACTIVATED: CPT

## 2020-02-12 PROCEDURE — 92928 PRQ TCAT PLMT NTRAC ST 1 LES: CPT | Performed by: INTERNAL MEDICINE

## 2020-02-12 PROCEDURE — 83036 HEMOGLOBIN GLYCOSYLATED A1C: CPT | Performed by: PHYSICIAN ASSISTANT

## 2020-02-12 PROCEDURE — C9600 PERC DRUG-EL COR STENT SING: HCPCS | Performed by: INTERNAL MEDICINE

## 2020-02-12 PROCEDURE — 0 IOPAMIDOL PER 1 ML: Performed by: INTERNAL MEDICINE

## 2020-02-12 PROCEDURE — 25010000002 MIDAZOLAM PER 1 MG: Performed by: INTERNAL MEDICINE

## 2020-02-12 PROCEDURE — 25010000002 HEPARIN (PORCINE) PER 1000 UNITS: Performed by: INTERNAL MEDICINE

## 2020-02-12 PROCEDURE — C1769 GUIDE WIRE: HCPCS | Performed by: INTERNAL MEDICINE

## 2020-02-12 PROCEDURE — S0260 H&P FOR SURGERY: HCPCS | Performed by: PHYSICIAN ASSISTANT

## 2020-02-12 PROCEDURE — C1894 INTRO/SHEATH, NON-LASER: HCPCS | Performed by: INTERNAL MEDICINE

## 2020-02-12 PROCEDURE — 93571 IV DOP VEL&/PRESS C FLO 1ST: CPT | Performed by: INTERNAL MEDICINE

## 2020-02-12 PROCEDURE — 80061 LIPID PANEL: CPT | Performed by: PHYSICIAN ASSISTANT

## 2020-02-12 PROCEDURE — C1769 GUIDE WIRE: HCPCS

## 2020-02-12 PROCEDURE — 85027 COMPLETE CBC AUTOMATED: CPT

## 2020-02-12 PROCEDURE — 80053 COMPREHEN METABOLIC PANEL: CPT | Performed by: INTERNAL MEDICINE

## 2020-02-12 PROCEDURE — 94640 AIRWAY INHALATION TREATMENT: CPT

## 2020-02-12 PROCEDURE — 93010 ELECTROCARDIOGRAM REPORT: CPT | Performed by: INTERNAL MEDICINE

## 2020-02-12 DEVICE — XIENCE SIERRA™ EVEROLIMUS ELUTING CORONARY STENT SYSTEM 2.25 MM X 38 MM / RAPID-EXCHANGE
Type: IMPLANTABLE DEVICE | Status: FUNCTIONAL
Brand: XIENCE SIERRA™

## 2020-02-12 RX ORDER — SODIUM CHLORIDE 9 MG/ML
1 INJECTION, SOLUTION INTRAVENOUS CONTINUOUS
Status: ACTIVE | OUTPATIENT
Start: 2020-02-12 | End: 2020-02-12

## 2020-02-12 RX ORDER — LIDOCAINE HYDROCHLORIDE 10 MG/ML
INJECTION, SOLUTION EPIDURAL; INFILTRATION; INTRACAUDAL; PERINEURAL AS NEEDED
Status: DISCONTINUED | OUTPATIENT
Start: 2020-02-12 | End: 2020-02-12 | Stop reason: HOSPADM

## 2020-02-12 RX ORDER — SODIUM CHLORIDE 0.9 % (FLUSH) 0.9 %
3 SYRINGE (ML) INJECTION EVERY 12 HOURS SCHEDULED
Status: DISCONTINUED | OUTPATIENT
Start: 2020-02-12 | End: 2020-02-12 | Stop reason: HOSPADM

## 2020-02-12 RX ORDER — ONDANSETRON 2 MG/ML
4 INJECTION INTRAMUSCULAR; INTRAVENOUS EVERY 6 HOURS PRN
Status: DISCONTINUED | OUTPATIENT
Start: 2020-02-12 | End: 2020-02-13 | Stop reason: HOSPADM

## 2020-02-12 RX ORDER — ONDANSETRON 2 MG/ML
4 INJECTION INTRAMUSCULAR; INTRAVENOUS EVERY 8 HOURS PRN
Status: DISCONTINUED | OUTPATIENT
Start: 2020-02-12 | End: 2020-02-12 | Stop reason: HOSPADM

## 2020-02-12 RX ORDER — ASPIRIN 325 MG
325 TABLET ORAL ONCE
Status: COMPLETED | OUTPATIENT
Start: 2020-02-12 | End: 2020-02-12

## 2020-02-12 RX ORDER — HYDROCODONE BITARTRATE AND ACETAMINOPHEN 5; 325 MG/1; MG/1
1 TABLET ORAL EVERY 4 HOURS PRN
Status: DISCONTINUED | OUTPATIENT
Start: 2020-02-12 | End: 2020-02-13 | Stop reason: HOSPADM

## 2020-02-12 RX ORDER — BISACODYL 10 MG
10 SUPPOSITORY, RECTAL RECTAL DAILY PRN
Status: DISCONTINUED | OUTPATIENT
Start: 2020-02-12 | End: 2020-02-13 | Stop reason: HOSPADM

## 2020-02-12 RX ORDER — LISINOPRIL 2.5 MG/1
2.5 TABLET ORAL DAILY
Status: DISCONTINUED | OUTPATIENT
Start: 2020-02-12 | End: 2020-02-13 | Stop reason: HOSPADM

## 2020-02-12 RX ORDER — ALPRAZOLAM 0.25 MG/1
0.25 TABLET ORAL 3 TIMES DAILY PRN
Status: DISCONTINUED | OUTPATIENT
Start: 2020-02-12 | End: 2020-02-13 | Stop reason: HOSPADM

## 2020-02-12 RX ORDER — CARVEDILOL 6.25 MG/1
6.25 TABLET ORAL 2 TIMES DAILY WITH MEALS
Status: DISCONTINUED | OUTPATIENT
Start: 2020-02-12 | End: 2020-02-13 | Stop reason: HOSPADM

## 2020-02-12 RX ORDER — NITROGLYCERIN 0.4 MG/1
0.4 TABLET SUBLINGUAL
Status: DISCONTINUED | OUTPATIENT
Start: 2020-02-12 | End: 2020-02-12 | Stop reason: HOSPADM

## 2020-02-12 RX ORDER — SODIUM CHLORIDE 0.9 % (FLUSH) 0.9 %
10 SYRINGE (ML) INJECTION AS NEEDED
Status: DISCONTINUED | OUTPATIENT
Start: 2020-02-12 | End: 2020-02-12 | Stop reason: HOSPADM

## 2020-02-12 RX ORDER — BUDESONIDE AND FORMOTEROL FUMARATE DIHYDRATE 160; 4.5 UG/1; UG/1
2 AEROSOL RESPIRATORY (INHALATION) 2 TIMES DAILY
Status: DISCONTINUED | OUTPATIENT
Start: 2020-02-12 | End: 2020-02-13 | Stop reason: HOSPADM

## 2020-02-12 RX ORDER — AMOXICILLIN 250 MG
2 CAPSULE ORAL 2 TIMES DAILY
Status: DISCONTINUED | OUTPATIENT
Start: 2020-02-12 | End: 2020-02-13 | Stop reason: HOSPADM

## 2020-02-12 RX ORDER — AMIODARONE HYDROCHLORIDE 200 MG/1
200 TABLET ORAL DAILY
Status: DISCONTINUED | OUTPATIENT
Start: 2020-02-12 | End: 2020-02-13 | Stop reason: HOSPADM

## 2020-02-12 RX ORDER — SODIUM CHLORIDE 9 MG/ML
250 INJECTION, SOLUTION INTRAVENOUS ONCE AS NEEDED
Status: DISCONTINUED | OUTPATIENT
Start: 2020-02-12 | End: 2020-02-13 | Stop reason: HOSPADM

## 2020-02-12 RX ORDER — ACETAMINOPHEN 325 MG/1
650 TABLET ORAL EVERY 4 HOURS PRN
Status: DISCONTINUED | OUTPATIENT
Start: 2020-02-12 | End: 2020-02-13 | Stop reason: HOSPADM

## 2020-02-12 RX ORDER — ATORVASTATIN CALCIUM 40 MG/1
80 TABLET, FILM COATED ORAL DAILY
Status: DISCONTINUED | OUTPATIENT
Start: 2020-02-12 | End: 2020-02-13 | Stop reason: HOSPADM

## 2020-02-12 RX ORDER — ONDANSETRON 4 MG/1
4 TABLET, FILM COATED ORAL EVERY 6 HOURS PRN
Status: DISCONTINUED | OUTPATIENT
Start: 2020-02-12 | End: 2020-02-13 | Stop reason: HOSPADM

## 2020-02-12 RX ORDER — SPIRONOLACTONE 25 MG/1
25 TABLET ORAL DAILY
Status: DISCONTINUED | OUTPATIENT
Start: 2020-02-12 | End: 2020-02-13 | Stop reason: HOSPADM

## 2020-02-12 RX ORDER — MIDAZOLAM HYDROCHLORIDE 1 MG/ML
INJECTION INTRAMUSCULAR; INTRAVENOUS AS NEEDED
Status: DISCONTINUED | OUTPATIENT
Start: 2020-02-12 | End: 2020-02-12 | Stop reason: HOSPADM

## 2020-02-12 RX ORDER — PRASUGREL 5 MG/1
TABLET, FILM COATED ORAL AS NEEDED
Status: DISCONTINUED | OUTPATIENT
Start: 2020-02-12 | End: 2020-02-12 | Stop reason: HOSPADM

## 2020-02-12 RX ORDER — BISACODYL 5 MG/1
5 TABLET, DELAYED RELEASE ORAL DAILY PRN
Status: DISCONTINUED | OUTPATIENT
Start: 2020-02-12 | End: 2020-02-13 | Stop reason: HOSPADM

## 2020-02-12 RX ORDER — PRASUGREL 10 MG/1
10 TABLET, FILM COATED ORAL DAILY
Status: DISCONTINUED | OUTPATIENT
Start: 2020-02-13 | End: 2020-02-13 | Stop reason: HOSPADM

## 2020-02-12 RX ORDER — ASPIRIN 81 MG/1
81 TABLET ORAL DAILY
Status: DISCONTINUED | OUTPATIENT
Start: 2020-02-12 | End: 2020-02-13 | Stop reason: HOSPADM

## 2020-02-12 RX ORDER — ASPIRIN 325 MG
325 TABLET, DELAYED RELEASE (ENTERIC COATED) ORAL DAILY
Status: DISCONTINUED | OUTPATIENT
Start: 2020-02-13 | End: 2020-02-12 | Stop reason: HOSPADM

## 2020-02-12 RX ORDER — HEPARIN SODIUM 1000 [USP'U]/ML
INJECTION, SOLUTION INTRAVENOUS; SUBCUTANEOUS AS NEEDED
Status: DISCONTINUED | OUTPATIENT
Start: 2020-02-12 | End: 2020-02-12 | Stop reason: HOSPADM

## 2020-02-12 RX ADMIN — ALPRAZOLAM 0.25 MG: 0.25 TABLET ORAL at 21:26

## 2020-02-12 RX ADMIN — HYDROCODONE BITARTRATE AND ACETAMINOPHEN 1 TABLET: 5; 325 TABLET ORAL at 14:57

## 2020-02-12 RX ADMIN — ALPRAZOLAM 0.25 MG: 0.25 TABLET ORAL at 12:38

## 2020-02-12 RX ADMIN — AMIODARONE HYDROCHLORIDE 200 MG: 200 TABLET ORAL at 14:56

## 2020-02-12 RX ADMIN — ACETAMINOPHEN 650 MG: 325 TABLET ORAL at 21:26

## 2020-02-12 RX ADMIN — BUDESONIDE AND FORMOTEROL FUMARATE DIHYDRATE 2 PUFF: 160; 4.5 AEROSOL RESPIRATORY (INHALATION) at 20:53

## 2020-02-12 RX ADMIN — CARVEDILOL 6.25 MG: 6.25 TABLET, FILM COATED ORAL at 21:26

## 2020-02-12 RX ADMIN — ASPIRIN 325 MG ORAL TABLET 325 MG: 325 PILL ORAL at 08:13

## 2020-02-12 RX ADMIN — ATORVASTATIN CALCIUM 80 MG: 40 TABLET, FILM COATED ORAL at 14:57

## 2020-02-12 NOTE — H&P
[facility]        Date of Hospital Visit: 20      Place of Service: [facility]    Patient Name: Reza Mcclelland  :1955      Primary Care Provider: Pearl Sabillon PA     Chief complaint/Reason for Consultation:  Abnormal Stress stress         Problem List:  Patient Active Problem List    Diagnosis    • Coronary artery disease involving native coronary artery of native heart without angina pectoris                · Cardiac catheterization for NSTEMI (2008): PCI of ramus a 3.0 x 12 mm Xience drug-eluting stent, 2008.  · Cardiac catheterization (2008): PCI to mid and proximal LAD.  Sure staged PCI of RCA planned  · Cardiac catheterization (2008): PCI to RCA.    · Cardiac catheterization for recurrent CCS class II-III angina  (2011): NACHO to proximal and distal LAD.  Widely patent stents in RCA.  LVEF normal at 65%.    · Cardiac catheterization for recurrent angina (2013): Stable CAD with RCA occlusion and patent stents in the left coronary system.  · Cardiac cath for NSTEMI  (2014): NACHO to the ramus intermedius:  Nonobstructive 50% to 60% mid to distal left anterior descending stenosis. Chronic total occlusion of the dominant right coronary artery served by left-sided collaterals with left ventricular systolic dysfunction. LVEF 15%.  · Cardiac PET scheduled 2020: EF 10%.  Reversible ischemia     • Ischemic cardiomyopathy                  · CRT-D implant ([employer], model #[id number]) by Dr. Kong.  · LVEF 10% to 15% noted on cardiac cath 2014.  · Echocardiogram 2020 ([facility]): EF 10 to 15%, diffuse hypokinesis, severely dilated LV, moderate LA dilation, aortic root mildly dilated 4.1, a sending aorta mildly dilated 3.8 cm     • Paroxysmal atrial fibrillation (CMS/HCC)                · Atrial fibrillation on presentation to outside hospital converted to sinus with Cardizem  drip and subsequent recurrence of atrial fibrillation while in the ICU, treated with amiodarone and returned to sinus, August 2014.  · Chads Vasc=6,      • Hyperlipidemia LDL goal <70         • Essential hypertension         • Peripheral vascular disease (CMS/HCC)                  · Occluded left carotid artery with occluded innominate artery and intracranial circulation via thyroid artery collaterals and left vertebral with left axillary to right axillary bypass graft using 8 mm. PTFE ringed Propaten graft with arteriogram to the right subclavian artery and right axillary artery and right carotid artery, April 2013.         • Left bundle branch block         • Leukocytosis    • Silent aspiration    • COPD    • Cardiac resynchronization therapy defibrillator (CRT-D) in place    • Carotid occlusion, left    • Cerebrovascular accident (CMS/HCC)              • Chronic kidney disease (CKD), stage III (moderate) (CMS/HCC)          History of Present Illness:  This is a 64-year-old hypertensive dyslipidemic male with known coronary artery disease, ischemic cardiomyopathy, paroxysmal atrial fibrillation, history of CVA and stage III kidney disease.  He recently was admitted to [facility] with pneumonia and panic attack.  At discharge he is referred back to our service for reevaluation.  He was evaluated in the heart and valve clinic where diuretics were reinitiated.  A myocardial perfusion study was ordered which shows reversible ischemia and worsened LV systolic function.  He has no current complaint of exertional chest pain or dyspnea and orthopnea no PND no claudication no lower extremity edema.  He has no awareness tachyarrhythmias, no dizziness or syncope.        Past Surgical History:   Procedure Laterality Date   • AXILLARY AXILLARY BYPASS     • CARDIAC CATHETERIZATION      stents   • CARDIAC DEFIBRILLATOR PLACEMENT     • CARDIAC ELECTROPHYSIOLOGY PROCEDURE N/A 3/13/2019    Procedure: BIV ICD generator  change- SJM in 4-6 weeks.;  Surgeon: Reza Lopes MD;  Location: [facility] INVASIVE LOCATION;  Service: Cardiology   • CAROTID STENT     • INSERT / REPLACE / REMOVE PACEMAKER         No Known Allergies    Medications Prior to Admission   Medication Sig Dispense Refill Last Dose   • albuterol (PROVENTIL) (5 MG/ML) 0.5% nebulizer solution Take 0.5 mL by nebulization Every 6 (Six) Hours As Needed for Wheezing. 1 mL 12 2/11/2020 at Unknown time   • amiodarone (PACERONE) 200 MG tablet take 1 tablet by mouth once daily (Patient taking differently: Pt taking 1/2 tablet or 100mg Daily) 90 tablet 2 2/11/2020 at 0900   • aspirin 81 MG EC tablet Take 81 mg by mouth Daily.   2/11/2020 at 0900   • atorvastatin (LIPITOR) 80 MG tablet take 1 tablet by mouth at bedtime 30 tablet 11 2/11/2020 at 2100   • budesonide-formoterol (SYMBICORT) 160-4.5 MCG/ACT inhaler Inhale 2 puffs 2 (Two) Times a Day. Please call primary care doctor for further refills 10.2 g 0 2/11/2020 at 2100   • carvedilol (COREG) 6.25 MG tablet take 1 tablet by mouth twice a day with food 180 tablet 1 2/11/2020 at 2100   • furosemide (LASIX) 20 MG tablet Take 1 tablet by mouth Daily. (Patient taking differently: Take 20 mg by mouth Daily As Needed.) 30 tablet 3 Past Week at Unknown time   • lisinopril (PRINIVIL,ZESTRIL) 2.5 MG tablet Take 1 tablet by mouth Daily. 30 tablet 11 2/11/2020 at 2100   • nitroglycerin (NITROSTAT) 0.4 MG SL tablet 1 under the tongue as needed for angina, may repeat q5mins for up three doses 100 tablet 11 Past Month at Unknown time   • potassium chloride (K-DUR,KLOR-CON) 20 MEQ CR tablet take 1 tablet by mouth once daily 30 tablet 5 2/11/2020 at 2100   • spironolactone (ALDACTONE) 25 MG tablet take 1 tablet once daily 90 tablet 3 2/11/2020 at 0900   • VENTOLIN  (90 Base) MCG/ACT inhaler Inhale 2 puffs Every 6 (Six) Hours As Needed for Wheezing. 1 inhaler 6 More than a month at Unknown time         Current Facility-Administered  Medications:   •  aspirin tablet 325 mg, 325 mg, Oral, Once **AND** [START ON 2020] aspirin EC tablet 325 mg, 325 mg, Oral, Daily, Reza Deshpande PA  •  nitroglycerin (NITROSTAT) SL tablet 0.4 mg, 0.4 mg, Sublingual, Q5 Min PRN, Reza Deshpande PA  •  ondansetron (ZOFRAN) injection 4 mg, 4 mg, Intravenous, Q8H PRN, Reza Deshpande PA  •  sodium chloride 0.9 % flush 10 mL, 10 mL, Intravenous, PRN, Reza Deshpande PA  •  sodium chloride 0.9 % flush 3 mL, 3 mL, Intravenous, Q12H, Reza Deshpande PA      Social History     Socioeconomic History   • Marital status: Single     Spouse name: Not on file   • Number of children: Not on file   • Years of education: Not on file   • Highest education level: Not on file   Tobacco Use   • Smoking status: Former Smoker     Packs/day: 1.00     Years: 48.00     Pack years: 48.00     Types: Cigarettes     Last attempt to quit: 2017     Years since quittin.2   • Smokeless tobacco: Never Used   • Tobacco comment: Pt quit smoking 6-7 months ago   Substance and Sexual Activity   • Alcohol use: No   • Drug use: No   • Sexual activity: Defer   Social History Narrative    Retired  for the [employer].  twice has a girlfriend.    One son.    Caffeine use: 0-1 cup coffee, 2-3 servings iced tea daily.    0 tea       Family History   Problem Relation Age of Onset   • Dementia Mother    • Prostate cancer Father    • Heart disease Father    • Cancer Brother         kidney   • No Known Problems Sister    • Heart failure Maternal Grandmother    • Heart disease Maternal Grandmother    • Heart attack Maternal Grandmother    • Heart disease Maternal Grandfather    • Heart attack Maternal Grandfather    • No Known Problems Paternal Grandmother    • Heart attack Paternal Grandfather    • No Known Problems Sister    • Heart attack Paternal Uncle    • Heart disease Paternal Uncle        REVIEW OF SYSTEMS:   Review of Systems   Constitution:  "Negative.   HENT: Negative.    Eyes: Negative.    Cardiovascular: Negative.    Respiratory: Negative.    Endocrine: Negative.    Hematologic/Lymphatic: Negative.    Skin: Negative.    Musculoskeletal: Negative.    Gastrointestinal: Negative.    Genitourinary: Negative.    Neurological: Negative.    Psychiatric/Behavioral: The patient is nervous/anxious.    Allergic/Immunologic: Negative.    All other systems reviewed and are negative.           Objective:  Vitals:    02/12/20 0656 02/12/20 0704 02/12/20 0705 02/12/20 0707   BP:  (!) 129/117 106/78 (!) 70/53   BP Location:  Right arm Left arm Right arm   Patient Position:  Lying Lying Lying   Pulse:   72    Resp:   18    Temp:   97.5 °F (36.4 °C)    TempSrc:   Temporal    SpO2:   95% 97%   Weight: 83.7 kg (184 lb 8.4 oz)      Height: 177.8 cm (70\")        Body mass index is 26.48 kg/m².  Flowsheet Rows      First Filed Value   Admission Height  177.8 cm (70\") Documented at 02/12/2020 0656   Admission Weight  83.7 kg (184 lb 8.4 oz) Documented at 02/12/2020 0656        No intake or output data in the 24 hours ending 02/12/20 0807    Physical Exam   Constitutional: He is oriented to person, place, and time. He appears well-developed and well-nourished.   HENT:   Head: Normocephalic and atraumatic.   Mouth/Throat: Oropharynx is clear and moist.   Eyes: Pupils are equal, round, and reactive to light. EOM are normal. No scleral icterus.   Neck: Neck supple. No JVD present. No thyromegaly present.   Cardiovascular: Normal rate, regular rhythm and normal heart sounds. Exam reveals no gallop and no friction rub.   No murmur heard.  Pulmonary/Chest: Breath sounds normal. No stridor. He has no wheezes. He has no rales.   Abdominal: Soft. Bowel sounds are normal. He exhibits no distension and no mass. There is no tenderness.   Musculoskeletal: Normal range of motion. He exhibits no edema, tenderness or deformity.   Lymphadenopathy:     He has no cervical adenopathy. "   Neurological: He is alert and oriented to person, place, and time. No cranial nerve deficit. Coordination normal.   Skin: Skin is warm and dry. No rash noted. No erythema.   Psychiatric: He has a normal mood and affect.   Vitals reviewed.        Lab Review:                    Results from last 7 days   Lab Units 02/12/20  0710   WBC 10*3/mm3 8.99   HEMOGLOBIN g/dL 15.1   HEMATOCRIT % 45.6   PLATELETS 10*3/mm3 153         Lab Results   Component Value Date    CHOL 126 07/10/2019    CHOL 141 10/10/2018    CHOL 101 05/02/2018    CHLPL 105 08/20/2014     Lab Results   Component Value Date    TRIG 59 07/10/2019    TRIG 70 10/10/2018    TRIG 74 05/02/2018     Lab Results   Component Value Date    HDL 47 07/10/2019    HDL 37 (L) 10/10/2018    HDL 38 (L) 05/02/2018     Lab Results   Component Value Date    LDL 67 07/10/2019     10/10/2018    LDL 53 05/02/2018   Estimated Creatinine Clearance: 56.6 mL/min (A) (by C-G formula based on SCr of 1.56 mg/dL (H)).                      Assessment:   · Abnormal myocardial perfusion study showing reversible ischemia  · Known coronary disease  · Ischemic cardiomyopathy  · Hypertension   · Dyslipidemia  · Chronic kidney disease stage III          Plan:   · Left heart catheterization possible catheter-based intervention            Electronically signed by EMILY Villavicencio, 02/12/20, 8:20 AM.    The risks, benefits, and alternative options of cardiac catheterization were discussed with the patient.  The risks include death, MI, stroke, infection, vascular injury requiring surgical repair and/or blood transfusion, coronary dissection, renal dysfunction/failure, allergic reaction, emergent CABG.  If PCI is needed there is a 1-2% risk of emergent CABG.  The patient is agreeable for cardiac catheterization, possible PCI or CABG. Plan is to proceed with cardiac catheterization and possible PCI.   The patient is at high risk for morbidity, mortality, and complications due to  extremely low ejection fraction, multiple vascular issues including axillary bypass, previous stroke, low blood pressure.  However despite these risks the patient and his wife at the bedside wish to proceed.  Guillaume Shore MD  9:29 AM

## 2020-02-13 VITALS
HEART RATE: 72 BPM | TEMPERATURE: 97.4 F | WEIGHT: 184.53 LBS | DIASTOLIC BLOOD PRESSURE: 75 MMHG | HEIGHT: 70 IN | BODY MASS INDEX: 26.42 KG/M2 | RESPIRATION RATE: 18 BRPM | SYSTOLIC BLOOD PRESSURE: 107 MMHG | OXYGEN SATURATION: 100 %

## 2020-02-13 PROBLEM — R94.39 ABNORMAL STRESS TEST: Status: RESOLVED | Noted: 2020-02-12 | Resolved: 2020-02-13

## 2020-02-13 LAB
ACT BLD: 169 SECONDS (ref 82–152)
ACT BLD: 186 SECONDS (ref 82–152)
ACT BLD: 202 SECONDS (ref 82–152)
ACT BLD: 263 SECONDS (ref 82–152)
ACT BLD: 290 SECONDS (ref 82–152)
ANION GAP SERPL CALCULATED.3IONS-SCNC: 9 MMOL/L (ref 5–15)
BUN BLD-MCNC: 27 MG/DL (ref 8–23)
BUN/CREAT SERPL: 19.9 (ref 7–25)
CALCIUM SPEC-SCNC: 8.5 MG/DL (ref 8.6–10.5)
CHLORIDE SERPL-SCNC: 105 MMOL/L (ref 98–107)
CO2 SERPL-SCNC: 22 MMOL/L (ref 22–29)
CREAT BLD-MCNC: 1.36 MG/DL (ref 0.76–1.27)
DEPRECATED RDW RBC AUTO: 44.1 FL (ref 37–54)
ERYTHROCYTE [DISTWIDTH] IN BLOOD BY AUTOMATED COUNT: 13 % (ref 12.3–15.4)
GFR SERPL CREATININE-BSD FRML MDRD: 53 ML/MIN/1.73
GLUCOSE BLD-MCNC: 110 MG/DL (ref 65–99)
HCT VFR BLD AUTO: 38.3 % (ref 37.5–51)
HGB BLD-MCNC: 12.9 G/DL (ref 13–17.7)
MCH RBC QN AUTO: 31.5 PG (ref 26.6–33)
MCHC RBC AUTO-ENTMCNC: 33.7 G/DL (ref 31.5–35.7)
MCV RBC AUTO: 93.4 FL (ref 79–97)
PLATELET # BLD AUTO: 150 10*3/MM3 (ref 140–450)
PMV BLD AUTO: 9.8 FL (ref 6–12)
POTASSIUM BLD-SCNC: 4.3 MMOL/L (ref 3.5–5.2)
RBC # BLD AUTO: 4.1 10*6/MM3 (ref 4.14–5.8)
SODIUM BLD-SCNC: 136 MMOL/L (ref 136–145)
WBC NRBC COR # BLD: 9.68 10*3/MM3 (ref 3.4–10.8)

## 2020-02-13 PROCEDURE — 99214 OFFICE O/P EST MOD 30 MIN: CPT | Performed by: INTERNAL MEDICINE

## 2020-02-13 PROCEDURE — 80048 BASIC METABOLIC PNL TOTAL CA: CPT | Performed by: INTERNAL MEDICINE

## 2020-02-13 PROCEDURE — 85027 COMPLETE CBC AUTOMATED: CPT | Performed by: INTERNAL MEDICINE

## 2020-02-13 RX ORDER — PRASUGREL 10 MG/1
10 TABLET, FILM COATED ORAL DAILY
Qty: 90 TABLET | Refills: 3 | Status: SHIPPED | OUTPATIENT
Start: 2020-02-14 | End: 2020-12-21 | Stop reason: SDUPTHER

## 2020-02-13 RX ORDER — FUROSEMIDE 20 MG/1
40 TABLET ORAL DAILY
Qty: 30 TABLET | Refills: 3 | Status: SHIPPED | OUTPATIENT
Start: 2020-02-13 | End: 2020-02-20 | Stop reason: SDUPTHER

## 2020-02-13 RX ADMIN — CARVEDILOL 6.25 MG: 6.25 TABLET, FILM COATED ORAL at 08:28

## 2020-02-13 RX ADMIN — BUDESONIDE AND FORMOTEROL FUMARATE DIHYDRATE 2 PUFF: 160; 4.5 AEROSOL RESPIRATORY (INHALATION) at 08:28

## 2020-02-13 RX ADMIN — PRASUGREL HYDROCHLORIDE 10 MG: 10 TABLET, FILM COATED ORAL at 08:28

## 2020-02-13 RX ADMIN — ATORVASTATIN CALCIUM 80 MG: 40 TABLET, FILM COATED ORAL at 08:28

## 2020-02-13 RX ADMIN — AMIODARONE HYDROCHLORIDE 200 MG: 200 TABLET ORAL at 08:28

## 2020-02-13 RX ADMIN — ASPIRIN 81 MG: 81 TABLET, COATED ORAL at 08:28

## 2020-02-13 RX ADMIN — SPIRONOLACTONE 25 MG: 25 TABLET ORAL at 08:28

## 2020-02-13 RX ADMIN — LISINOPRIL 2.5 MG: 2.5 TABLET ORAL at 08:28

## 2020-02-13 NOTE — PLAN OF CARE
Problem: Patient Care Overview  Goal: Plan of Care Review  Outcome: Ongoing (interventions implemented as appropriate)  Flowsheets (Taken 2/13/2020 0125)  Progress: improving  Plan of Care Reviewed With: patient  Outcome Summary: Pt had a C w/ Dr. Shore today with x1 stent to the circumflex. Pt R femoral site is bruised but soft. Pt is alert/oriented. VSS. Continuing to monitor. Continuing POC.     Problem: Cardiac Catheterization (Diagnostic/Interventional) (Adult)  Goal: Signs and Symptoms of Listed Potential Problems Will be Absent, Minimized or Managed (Cardiac Catheterization)  Outcome: Ongoing (interventions implemented as appropriate)  Flowsheets (Taken 2/13/2020 0125)  Problems Assessed (Cardiac Catheterization): all  Problems Present (Cardiac Cath): none     Problem: Cardiac Catheterization (Diagnostic/Interventional) (Adult)  Goal: Anesthesia/Sedation Recovery  Outcome: Ongoing (interventions implemented as appropriate)  Flowsheets (Taken 2/13/2020 0125)  Anesthesia/Sedation Recovery: recovered to baseline; criteria met for discharge

## 2020-02-13 NOTE — NURSING NOTE
Patient Name:  Reza Mcclelland  :  1955  DOS:  20    Active Hospital Problems    Diagnosis   • Coronary artery disease involving native coronary artery of native heart without angina pectoris     · Cardiac catheterization for NSTEMI (2008): PCI of ramus a 3.0 x 12 mm Xience drug-eluting stent, 2008.  · Cardiac catheterization (2008): PCI to mid and proximal LAD.  Sure staged PCI of RCA planned  · Cardiac catheterization (2008): PCI to RCA.    · Cardiac catheterization for recurrent CCS class II-III angina  (2011): NACHO to proximal and distal LAD.  Widely patent stents in RCA.  LVEF normal at 65%.    · Cardiac catheterization for recurrent angina (2013): Stable CAD with RCA occlusion and patent stents in the left coronary system.  · Cardiac cath for NSTEMI  (2014): NACHO to the ramus intermedius:  Nonobstructive 50% to 60% mid to distal left anterior descending stenosis. Chronic total occlusion of the dominant right coronary artery served by left-sided collaterals with left ventricular systolic dysfunction. LVEF 15%.  · Cardiac PET scheduled 2020: EF 10%.  Reversible ischemia  · [facility] 20: Distal LAD  in-stent, ramus intermedius ostial  in-stent, RCA ostial . Significant mid left circumflex stenosis, IFR 0.58 prior to intervention Status post successful PCI of the mid RCA with deployment of a 2.25 x 38 mm Xience NACHO      • Essential hypertension   • Hyperlipidemia LDL goal <70   • Ischemic cardiomyopathy       · CRT-D implant ([employer], model #[id number]) by Dr. Kong.  · LVEF 10% to 15% noted on cardiac cath 2014.  · Echocardiogram 2020 ([facility]): EF 10 to 15%, diffuse hypokinesis, severely dilated LV, moderate LA dilation, aortic root mildly dilated 4.1, a sending aorta mildly dilated 3.8 cm     • Paroxysmal atrial fibrillation (CMS/HCC)     · Atrial fibrillation on presentation to outside hospital converted to  sinus with Cardizem drip and subsequent recurrence of atrial fibrillation while in the ICU, treated with amiodarone and returned to sinus, August 2014.  · Chads Vasc=7,          Consult has been received.  Medical records have been reviewed.  Patient is a good candidate for the [facility].  Education provided.   Patient to be scheduled follow up one week  post discharge.    Echo Results: EF 10% on recent stress test     NYHA Class: IV    Heart Failure Quality Measures    ACE I, ARB, ARNI (if EF <40%)     Yes      Evidence-based Beta Blocker (EF<40%)    (Bisoprolol, Carvedilol, Metoprolol Succinate)  Yes      Aldosterone Antagonist (EF <40%)  Yes      Heart Failure Education    Signs / symptoms and Other:  HF zones     If EF < 35%  BIV ICD in place    Atrial fibrillation / Atrial flutter:  Quality Measure    CHADS-VASc Score:  CHADS-VASc Risk Assessment            5       Total Score        1 Hypertension    2 PRIOR STROKE/TIA/THROMBO    1 Vascular Disease    1 Age 65-74        Criteria that do not apply:    CHF    Age >/= 75    DM    Sex: Female            Anticoagulation for CHADS-VASc >/=2    If no contraindications:  Other:  patient refuses    Statin Therapy (for patients with a history of CAD, CVA/TIA, PVD, DM)  Yes

## 2020-02-13 NOTE — PROGRESS NOTES
Patient identified as qualifier for Phase II Cardiac Rehab. Staff discussed benefits of exercise, program protocol, and educational material provided. Teach back verified. Patient declined the program stating that he exercises weekly at the gym. Staff discuss home exercise guidelines and AHA exercise recommendations.

## 2020-02-13 NOTE — DISCHARGE SUMMARY
Date of Discharge:  2/13/2020    Discharge Diagnosis: Unstable angina status post PCI of the mid left circumflex with a 2.25 x 30 mm Xience NACHO postdilated 2.5 mm.    Presenting Problem/History of Present Illness  Abnormal stress test [R94.39]  Abnormal stress test [R94.39]      Hospital Course  Patient is a 64 y.o. male presented with symptoms concerning for unstable angina with high risk features on a recent nuclear stress test showing significant lateral wall ischemia.  Patient underwent cardiac catheterization which revealed a known RCA , distal LAD , and ostial ramus intermedius , with significant plaque of the mid left circumflex.  Circumflex is supplying collaterals to all other vessels.  IFR of the mid left circumflex was markedly +0.58.  He underwent successful PCI with drug-eluting stent placement of the mid left circumflex, post IFR normal at 0.98.  He did well overnight, renal function improved day of discharge, patient was stable, chest pain-free, no shortness of breath, groin site with ecchymosis but no hematoma or bruit.  Patient be discharged with aspirin and Effient, recommend indefinite dual antiplatelet therapy due to significant coronary disease unamenable to revascularization.  Patient advised to take 40 mg Lasix daily due to elevated LVEDP at 23 mmHg.  Patient should have a BMP which was ordered in 1 week.  He will follow-up with Dr. Guevara in 4 to 6 weeks.  Patient advised to call 911 if he has chest pain that does not respond to nitroglycerin x2.  Of note, the patient's refuses anticoagulation for stroke prevention with known history of paroxysmal atrial fibrillation.    Procedures Performed  Procedure(s):  Left Heart Cath       Consults:   Consults     No orders found for last 30 day(s).          Pertinent Test Results:     Ejection Fraction  Lab Results   Component Value Date    EF 10 02/05/2020       Echo EF Estimated  Lab Results   Component Value Date    ECHOEFEST 30  "12/15/2017       Nuclear Stress Ejection Fraction  No components found for: NUCEF    Cath Ejection Fraction Quantitative  No results found for: CATHEF    Condition on Discharge: Stable    Physical Exam at Discharge    Vital Signs  /75   Pulse 72   Temp 97.6 °F (36.4 °C) (Temporal)   Resp 18   Ht 177.8 cm (70\")   Wt 83.7 kg (184 lb 8.4 oz)   SpO2 100%   BMI 26.48 kg/m²       Physical Exam:  General Appearance:   · well developed  · well nourished  HENT:   · oropharynx moist  · lips not cyanotic  Neck:  · thyroid not enlarged  · supple  Respiratory:  · no respiratory distress  · normal breath sounds  · no rales  Cardiovascular:  · no jugular venous distention  · regular rhythm  · apical impulse normal  · S1 normal, S2 normal  · no S3, no S4   · no murmur  · no rub, no thrill  · carotid pulses normal; no bruit  · pedal pulses normal  · lower extremity edema: none   right groin ecchymosis but no hematoma or bruit  Gastrointestinal:   · bowel sounds normal  · non-tender  · no hepatomegaly, no splenomegaly  Musculoskeletal:  · no clubbing of fingers.   · normocephalic, head atraumatic  Skin:   · warm  · dry  Psychiatric:  · judgement and insight appropriate  · normal mood and affect      Discharge Disposition  Home or Self Care    Discharge Medications     Discharge Medications      New Medications      Instructions Start Date   prasugrel 10 MG tablet  Commonly known as:  EFFIENT   10 mg, Oral, Daily   Start Date:  February 14, 2020        Changes to Medications      Instructions Start Date   amiodarone 200 MG tablet  Commonly known as:  PACERONE  What changed:    · how much to take  · how to take this  · when to take this  · additional instructions   take 1 tablet by mouth once daily      furosemide 20 MG tablet  Commonly known as:  LASIX  What changed:  how much to take   40 mg, Oral, Daily         Continue These Medications      Instructions Start Date   albuterol (5 MG/ML) 0.5% nebulizer " solution  Commonly known as:  PROVENTIL   2.5 mg, Nebulization, Every 6 Hours PRN      VENTOLIN  (90 Base) MCG/ACT inhaler  Generic drug:  albuterol sulfate HFA   2 puffs, Inhalation, Every 6 Hours PRN      aspirin 81 MG EC tablet   81 mg, Oral, Daily      atorvastatin 80 MG tablet  Commonly known as:  LIPITOR   take 1 tablet by mouth at bedtime      budesonide-formoterol 160-4.5 MCG/ACT inhaler  Commonly known as:  SYMBICORT   2 puffs, Inhalation, 2 Times Daily, Please call primary care doctor for further refills      carvedilol 6.25 MG tablet  Commonly known as:  COREG   take 1 tablet by mouth twice a day with food      lisinopril 2.5 MG tablet  Commonly known as:  PRINIVIL,ZESTRIL   2.5 mg, Oral, Daily      nitroglycerin 0.4 MG SL tablet  Commonly known as:  NITROSTAT   1 under the tongue as needed for angina, may repeat q5mins for up three doses      potassium chloride 20 MEQ CR tablet  Commonly known as:  K-DUR,KLOR-CON   take 1 tablet by mouth once daily      spironolactone 25 MG tablet  Commonly known as:  ALDACTONE   take 1 tablet once daily             Discharge Diet: Cardiac    Activity at Discharge: As tolerated    Follow-up Appointments  Future Appointments   Date Time Provider Department Center   2/19/2020 11:00 AM Andrea Guevara MD [facility] None   4/23/2020 10:45 AM Isis Hawthorne APRN [facility]     Additional Instructions for the Follow-ups that You Need to Schedule     Discharge Follow-up with Specialty: Dr. Guevara; 6 Weeks   As directed      Specialty:  Dr. Guevara    Follow Up:  6 Weeks    Follow Up Details:  Hospital FU for pci         Basic Metabolic Panel   Feb 20, 2020            Test Results Pending at Discharge       Guillaume Shore MD  02/13/20  9:06 AM    Time: Discharge 33 min

## 2020-02-13 NOTE — PLAN OF CARE
Problem: Patient Care Overview  Goal: Plan of Care Review  Outcome: Outcome(s) achieved  Flowsheets (Taken 2/13/2020 0944)  Progress: improving  Plan of Care Reviewed With: patient; spouse  Outcome Summary: DISCHARGE TEACHING COMPLETE. PT AND SPOUSE VERBALIZE UNDERSTANDING. NO QUESTIONS OR CONCERNS. FEMORAL SITE IS CLEAN DRY SOFT AND INTACT WITH A SMALL AMOUNT OF BRUISING. NO COMPLAINTS OF PAIN. VSS.

## 2020-02-14 LAB — ACT BLD: 219 SECONDS (ref 82–152)

## 2020-02-19 ENCOUNTER — LAB REQUISITION (OUTPATIENT)
Dept: LAB | Facility: HOSPITAL | Age: 65
End: 2020-02-19

## 2020-02-19 ENCOUNTER — OFFICE VISIT (OUTPATIENT)
Dept: CARDIOLOGY | Facility: HOSPITAL | Age: 65
End: 2020-02-19

## 2020-02-19 VITALS
TEMPERATURE: 97.9 F | BODY MASS INDEX: 26.4 KG/M2 | WEIGHT: 184.38 LBS | HEIGHT: 70 IN | HEART RATE: 72 BPM | OXYGEN SATURATION: 96 % | SYSTOLIC BLOOD PRESSURE: 110 MMHG | DIASTOLIC BLOOD PRESSURE: 59 MMHG | RESPIRATION RATE: 18 BRPM

## 2020-02-19 DIAGNOSIS — I10 ESSENTIAL HYPERTENSION: ICD-10-CM

## 2020-02-19 DIAGNOSIS — Z00.00 ROUTINE GENERAL MEDICAL EXAMINATION AT A HEALTH CARE FACILITY: ICD-10-CM

## 2020-02-19 DIAGNOSIS — I25.5 ISCHEMIC CARDIOMYOPATHY: ICD-10-CM

## 2020-02-19 DIAGNOSIS — I50.22 CHRONIC SYSTOLIC CONGESTIVE HEART FAILURE (HCC): ICD-10-CM

## 2020-02-19 DIAGNOSIS — N18.30 CKD (CHRONIC KIDNEY DISEASE), STAGE III (HCC): ICD-10-CM

## 2020-02-19 LAB
ANION GAP SERPL CALCULATED.3IONS-SCNC: 13 MMOL/L (ref 5–15)
BUN BLD-MCNC: 24 MG/DL (ref 8–23)
BUN/CREAT SERPL: 13.3 (ref 7–25)
CALCIUM SPEC-SCNC: 9.4 MG/DL (ref 8.6–10.5)
CHLORIDE SERPL-SCNC: 99 MMOL/L (ref 98–107)
CO2 SERPL-SCNC: 27 MMOL/L (ref 22–29)
CREAT BLD-MCNC: 1.8 MG/DL (ref 0.76–1.27)
GFR SERPL CREATININE-BSD FRML MDRD: 38 ML/MIN/1.73
GLUCOSE BLD-MCNC: 78 MG/DL (ref 65–99)
POTASSIUM BLD-SCNC: 3.8 MMOL/L (ref 3.5–5.2)
SODIUM BLD-SCNC: 139 MMOL/L (ref 136–145)

## 2020-02-19 PROCEDURE — 99214 OFFICE O/P EST MOD 30 MIN: CPT | Performed by: NURSE PRACTITIONER

## 2020-02-19 PROCEDURE — 80048 BASIC METABOLIC PNL TOTAL CA: CPT | Performed by: NURSE PRACTITIONER

## 2020-02-19 NOTE — PROGRESS NOTES
[facility]  [facility]      Encounter Date:02/19/2020     Reza Mcclelland  [address] [place] [address]  [unfilled]    1955    Pearl Sabillon PA    Reza Mcclelland is a 64 y.o. male.      Subjective:     Chief Complaint:  Congestive Heart Failure and Follow-up       HPI     64-year-old male with history of ischemic cardiomyopathy, PVD, A. fib, CVA currently on warfarin, dyslipidemia, chronic kidney disease stage III, left bundle branch block, hypertension, biventricular ICD.    Patient with acute heart failure with worsening EF, abnormal stress test.  Cardiac catheterization completed on 2/12/2020 with in-stent stenosis, PCI of mid RCA and deployment of drug-eluting stent.  EF per cardiac stress PET on 2/5/2020 10%.      Pt denies CP, pressure, dizziness, syncope, edema.  Dyspnea has improved.  Doing well with meds.    Patient Active Problem List    Diagnosis Date Noted   • Leukocytosis 12/16/2017   • Silent aspiration 12/16/2017   • COPD 12/15/2017   • Cardiac resynchronization therapy defibrillator (CRT-D) in place 12/15/2017   • Carotid occlusion, left 12/15/2017   • Coronary artery disease involving native coronary artery of native heart without angina pectoris      Note Last Updated: 2/13/2020     · Cardiac catheterization for NSTEMI (11/19/2008): PCI of ramus a 3.0 x 12 mm Xience drug-eluting stent, 11/19/2008.  · Cardiac catheterization (12/4/2008): PCI to mid and proximal LAD.  Sure staged PCI of RCA planned  · Cardiac catheterization (12/18/2008): PCI to RCA.    · Cardiac catheterization for recurrent CCS class II-III angina  (01/21/2011): NACHO to proximal and distal LAD.  Widely patent stents in RCA.  LVEF normal at 65%.    · Cardiac catheterization for recurrent angina (4/2013): Stable CAD with RCA occlusion and patent stents in the left coronary system.  · Cardiac cath for NSTEMI  (08/20/2014): NACHO to the ramus intermedius:  Nonobstructive 50% to 60% mid to  distal left anterior descending stenosis. Chronic total occlusion of the dominant right coronary artery served by left-sided collaterals with left ventricular systolic dysfunction. LVEF 15%.  · Cardiac PET scheduled 2/5/2020: EF 10%.  Reversible ischemia  · [facility] 2-12-20: Distal LAD  in-stent, ramus intermedius ostial  in-stent, RCA ostial . Significant mid left circumflex stenosis, IFR 0.58 prior to intervention Status post successful PCI of the mid RCA with deployment of a 2.25 x 38 mm Xience NACHO      • Ischemic cardiomyopathy      Note Last Updated: 2/12/2020       · CRT-D implant ([employer], model #[id number]) by Dr. Kong.  · LVEF 10% to 15% noted on cardiac cath 08/20/2014.  · Echocardiogram 1/22/2020 ([facility]): EF 10 to 15%, diffuse hypokinesis, severely dilated LV, moderate LA dilation, aortic root mildly dilated 4.1, a sending aorta mildly dilated 3.8 cm     • Peripheral vascular disease (CMS/HCC)      Note Last Updated: 2/12/2020       · Occluded left carotid artery with occluded innominate artery and intracranial circulation via thyroid artery collaterals and left vertebral with left axillary to right axillary bypass graft using 8 mm. PTFE ringed Propaten graft with arteriogram to the right subclavian artery and right axillary artery and right carotid artery, April 2013.         • Cerebrovascular accident (CMS/HCC)    • Paroxysmal atrial fibrillation (CMS/HCC)      Note Last Updated: 2/13/2020     · Atrial fibrillation on presentation to outside hospital converted to sinus with Cardizem drip and subsequent recurrence of atrial fibrillation while in the ICU, treated with amiodarone and returned to sinus, August 2014.  · Chads Vasc=7,      • Hyperlipidemia LDL goal <70    • Chronic kidney disease (CKD), stage III (moderate) (CMS/HCC)    • Left bundle branch block    • Essential hypertension          Past Surgical History:   Procedure Laterality Date   • AXILLARY AXILLARY  BYPASS     • CARDIAC CATHETERIZATION      stents   • CARDIAC CATHETERIZATION N/A 2/12/2020    Procedure: Left Heart Cath;  Surgeon: Guillaume Shore MD;  Location:  [facility] CATH [facility]VASIVE LOCATION;  Service: Cardiology;  Laterality: N/A;   • CARDIAC DEFIBRILLATOR PLACEMENT     • CARDIAC ELECTROPHYSIOLOGY PROCEDURE N/A 3/13/2019    Procedure: BIV ICD generator change- S[facility] in 4-6 weeks.;  Surgeon: Reza Lopes MD;  Location:  [facility] EP INVASIVE LOCATION;  Service: Cardiology   • CAROTID STENT     • INSERT / REPLACE / REMOVE PACEMAKER         No Known Allergies      Current Outpatient Medications:   •  albuterol (PROVENTIL) (5 MG/ML) 0.5% nebulizer solution, Take 0.5 mL by nebulization Every 6 (Six) Hours As Needed for Wheezing., Disp: 1 mL, Rfl: 12  •  amiodarone (PACERONE) 200 MG tablet, take 1 tablet by mouth once daily (Patient taking differently: Pt taking 1/2 tablet or 100mg Daily), Disp: 90 tablet, Rfl: 2  •  aspirin 81 MG EC tablet, Take 81 mg by mouth Daily., Disp: , Rfl:   •  atorvastatin (LIPITOR) 80 MG tablet, take 1 tablet by mouth at bedtime, Disp: 30 tablet, Rfl: 11  •  budesonide-formoterol (SYMBICORT) 160-4.5 MCG/ACT inhaler, Inhale 2 puffs 2 (Two) Times a Day. Please call primary care doctor for further refills, Disp: 10.2 g, Rfl: 0  •  carvedilol (COREG) 6.25 MG tablet, take 1 tablet by mouth twice a day with food, Disp: 180 tablet, Rfl: 1  •  furosemide (LASIX) 20 MG tablet, Take 2 tablets by mouth Daily., Disp: 30 tablet, Rfl: 3  •  lisinopril (PRINIVIL,ZESTRIL) 2.5 MG tablet, Take 1 tablet by mouth Daily., Disp: 30 tablet, Rfl: 11  •  nitroglycerin (NITROSTAT) 0.4 MG SL tablet, 1 under the tongue as needed for angina, may repeat q5mins for up three doses, Disp: 100 tablet, Rfl: 11  •  potassium chloride (K-DUR,KLOR-CON) 20 MEQ CR tablet, take 1 tablet by mouth once daily, Disp: 30 tablet, Rfl: 5  •  prasugrel (EFFIENT) 10 MG tablet, Take 1 tablet by mouth Daily., Disp: 90 tablet, Rfl: 3  •   "spironolactone (ALDACTONE) 25 MG tablet, take 1 tablet once daily, Disp: 90 tablet, Rfl: 3  •  VENTOLIN  (90 Base) MCG/ACT inhaler, Inhale 2 puffs Every 6 (Six) Hours As Needed for Wheezing., Disp: 1 inhaler, Rfl: 6    The following portions of the patient's history were reviewed and updated today during office visit as appropriate: allergies, current medications, past family history, past medical history, past social history, past surgical history and problem list.    Review of Systems   Respiratory: Positive for shortness of breath.    Hematologic/Lymphatic: Bruises/bleeds easily.   All other systems reviewed and are negative.      Objective:     Vitals:    02/19/20 1413   BP: 110/59   BP Location: Left arm   Patient Position: Sitting   Cuff Size: Adult   Pulse: 72   Resp: 18   Temp: 97.9 °F (36.6 °C)   TempSrc: Temporal   SpO2: 96%   Weight: 83.6 kg (184 lb 6 oz)   Height: 177.8 cm (70\")         Physical Exam   Constitutional: He is oriented to person, place, and time. He appears well-developed and well-nourished. No distress.   HENT:   Mouth/Throat: Oropharynx is clear and moist.   Eyes: No scleral icterus.   Neck: No hepatojugular reflux and no JVD present. Carotid bruit is not present. No tracheal deviation present. No thyromegaly present.   Cardiovascular: Normal rate, regular rhythm, normal heart sounds and intact distal pulses. Exam reveals no friction rub.   No murmur heard.  Pulmonary/Chest: Effort normal and breath sounds normal.   Abdominal: Soft. Bowel sounds are normal. He exhibits no distension. There is no tenderness.   Musculoskeletal: He exhibits no edema.   Lymphadenopathy:     He has no cervical adenopathy.   Neurological: He is alert and oriented to person, place, and time.   Skin: Skin is warm, dry and intact. Bruising (bilateral arms. ) noted. No rash noted. No cyanosis or erythema. No pallor.   Psychiatric: He has a normal mood and affect. His behavior is normal. Thought content " normal.   Vitals reviewed.      Lab and Diagnostic Review:  Admission on 02/12/2020, Discharged on 02/13/2020   Component Date Value Ref Range Status   • Hemoglobin A1C 02/12/2020 5.70* 4.80 - 5.60 % Final   • Total Cholesterol 02/12/2020 99  0 - 200 mg/dL Final   • Triglycerides 02/12/2020 73  0 - 150 mg/dL Final   • HDL Cholesterol 02/12/2020 44  40 - 60 mg/dL Final   • LDL Cholesterol  02/12/2020 40  0 - 100 mg/dL Final   • VLDL Cholesterol 02/12/2020 14.6  mg/dL Final   • LDL/HDL Ratio 02/12/2020 0.92   Final   • WBC 02/12/2020 8.99  3.40 - 10.80 10*3/mm3 Final   • RBC 02/12/2020 4.90  4.14 - 5.80 10*6/mm3 Final   • Hemoglobin 02/12/2020 15.1  13.0 - 17.7 g/dL Final   • Hematocrit 02/12/2020 45.6  37.5 - 51.0 % Final   • MCV 02/12/2020 93.1  79.0 - 97.0 fL Final   • MCH 02/12/2020 30.8  26.6 - 33.0 pg Final   • MCHC 02/12/2020 33.1  31.5 - 35.7 g/dL Final   • RDW 02/12/2020 13.0  12.3 - 15.4 % Final   • RDW-SD 02/12/2020 44.0  37.0 - 54.0 fl Final   • MPV 02/12/2020 9.7  6.0 - 12.0 fL Final   • Platelets 02/12/2020 153  140 - 450 10*3/mm3 Final   • Glucose 02/12/2020 112* 65 - 99 mg/dL Final   • BUN 02/12/2020 32* 8 - 23 mg/dL Final   • Creatinine 02/12/2020 1.64* 0.76 - 1.27 mg/dL Final   • Sodium 02/12/2020 139  136 - 145 mmol/L Final   • Potassium 02/12/2020 4.6  3.5 - 5.2 mmol/L Final   • Chloride 02/12/2020 105  98 - 107 mmol/L Final   • CO2 02/12/2020 26.0  22.0 - 29.0 mmol/L Final   • Calcium 02/12/2020 9.0  8.6 - 10.5 mg/dL Final   • Total Protein 02/12/2020 6.9  6.0 - 8.5 g/dL Final   • Albumin 02/12/2020 4.30  3.50 - 5.20 g/dL Final   • ALT (SGPT) 02/12/2020 10  1 - 41 U/L Final   • AST (SGOT) 02/12/2020 13  1 - 40 U/L Final   • Alkaline Phosphatase 02/12/2020 69  39 - 117 U/L Final   • Total Bilirubin 02/12/2020 0.6  0.2 - 1.2 mg/dL Final   • eGFR Non African Amer 02/12/2020 43* >60 mL/min/1.73 Final   • Globulin 02/12/2020 2.6  gm/dL Final   • A/G Ratio 02/12/2020 1.7  g/dL Final   • BUN/Creatinine  Ratio 02/12/2020 19.5  7.0 - 25.0 Final   • Anion Gap 02/12/2020 8.0  5.0 - 15.0 mmol/L Final   • WBC 02/13/2020 9.68  3.40 - 10.80 10*3/mm3 Final   • RBC 02/13/2020 4.10* 4.14 - 5.80 10*6/mm3 Final   • Hemoglobin 02/13/2020 12.9* 13.0 - 17.7 g/dL Final   • Hematocrit 02/13/2020 38.3  37.5 - 51.0 % Final   • MCV 02/13/2020 93.4  79.0 - 97.0 fL Final   • MCH 02/13/2020 31.5  26.6 - 33.0 pg Final   • MCHC 02/13/2020 33.7  31.5 - 35.7 g/dL Final   • RDW 02/13/2020 13.0  12.3 - 15.4 % Final   • RDW-SD 02/13/2020 44.1  37.0 - 54.0 fl Final   • MPV 02/13/2020 9.8  6.0 - 12.0 fL Final   • Platelets 02/13/2020 150  140 - 450 10*3/mm3 Final   • Glucose 02/13/2020 110* 65 - 99 mg/dL Final   • BUN 02/13/2020 27* 8 - 23 mg/dL Final   • Creatinine 02/13/2020 1.36* 0.76 - 1.27 mg/dL Final   • Sodium 02/13/2020 136  136 - 145 mmol/L Final   • Potassium 02/13/2020 4.3  3.5 - 5.2 mmol/L Final   • Chloride 02/13/2020 105  98 - 107 mmol/L Final   • CO2 02/13/2020 22.0  22.0 - 29.0 mmol/L Final   • Calcium 02/13/2020 8.5* 8.6 - 10.5 mg/dL Final   • eGFR Non African Amer 02/13/2020 53* >60 mL/min/1.73 Final   • BUN/Creatinine Ratio 02/13/2020 19.9  7.0 - 25.0 Final   • Anion Gap 02/13/2020 9.0  5.0 - 15.0 mmol/L Final   • Activated Clotting Time  02/12/2020 186* 82 - 152 Seconds Final    Serial Number: [id number]:  [id number]   • Activated Clotting Time  02/12/2020 169* 82 - 152 Seconds Final    Serial Number: [id number]:  [id number]   • Activated Clotting Time  02/12/2020 202* 82 - 152 Seconds Final    Serial Number: [id number]:  [id number]   • Activated Clotting Time  02/12/2020 263* 82 - 152 Seconds Final    Serial Number: [id number]:  [id number]   • Activated Clotting Time  02/12/2020 290* 82 - 152 Seconds Final    Serial Number: [id number]:  [id number]   • Activated Clotting Time  02/12/2020 219* 82 - 152 Seconds Final    Serial Number: [id number]:  [id number]       Assessment and Plan:         1. Ischemic cardiomyopathy  Asa,  statin, Effient (life long)    2. Chronic systolic congestive heart failure (CMS/[facility])  EF 10%  Coreg, ace, aldactone  Lasix      3. Essential hypertension  stable    4. CKD (chronic kidney disease), stage III (CMS/[facility])    - Basic Metabolic Panel; Future      F/u with Dr. Guevara next month as scheduled.  F/u [facility] 3 months or sooner if needed.     *Please note that portions of this note were completed with a voice recognition program. Efforts were made to edit the dictations, but occasionally words are mistranscribed.

## 2020-02-20 ENCOUNTER — TELEPHONE (OUTPATIENT)
Dept: CARDIOLOGY | Facility: HOSPITAL | Age: 65
End: 2020-02-20

## 2020-02-20 DIAGNOSIS — I50.23 ACUTE ON CHRONIC SYSTOLIC CONGESTIVE HEART FAILURE (HCC): ICD-10-CM

## 2020-02-20 DIAGNOSIS — N18.30 CKD (CHRONIC KIDNEY DISEASE) STAGE 3, GFR 30-59 ML/MIN (HCC): Primary | ICD-10-CM

## 2020-02-20 RX ORDER — FUROSEMIDE 20 MG/1
20 TABLET ORAL DAILY
Qty: 30 TABLET | Refills: 3 | Status: SHIPPED | OUTPATIENT
Start: 2020-02-20 | End: 2020-06-29

## 2020-02-20 NOTE — TELEPHONE ENCOUNTER
Kidney function slightly worse.    Decrease Lasix 20 mg daily (from 40 mg daily)    Repeat BMP 2 weeks.     Lab Requisition on 02/19/2020   Component Date Value Ref Range Status   • Glucose 02/19/2020 78  65 - 99 mg/dL Final   • BUN 02/19/2020 24* 8 - 23 mg/dL Final   • Creatinine 02/19/2020 1.80* 0.76 - 1.27 mg/dL Final   • Sodium 02/19/2020 139  136 - 145 mmol/L Final   • Potassium 02/19/2020 3.8  3.5 - 5.2 mmol/L Final   • Chloride 02/19/2020 99  98 - 107 mmol/L Final   • CO2 02/19/2020 27.0  22.0 - 29.0 mmol/L Final   • Calcium 02/19/2020 9.4  8.6 - 10.5 mg/dL Final   • eGFR Non African Amer 02/19/2020 38* >60 mL/min/1.73 Final   • BUN/Creatinine Ratio 02/19/2020 13.3  7.0 - 25.0 Final   • Anion Gap 02/19/2020 13.0  5.0 - 15.0 mmol/L Final

## 2020-02-21 RX ORDER — LISINOPRIL 2.5 MG/1
TABLET ORAL
Qty: 90 TABLET | Refills: 3 | Status: SHIPPED | OUTPATIENT
Start: 2020-02-21 | End: 2020-03-27 | Stop reason: ALTCHOICE

## 2020-03-23 LAB
BUN SERPL-MCNC: 24 MG/DL (ref 8–23)
BUN/CREAT SERPL: 13.3 (ref 7–25)
CHLORIDE SERPL-SCNC: 99 MMOL/L (ref 98–107)
CO2 SERPL-SCNC: 27 MMOL/L (ref 22–29)
CREAT SERPL-MCNC: 1.8 MG/DL (ref 0.76–1.27)
GLUCOSE SERPL-MCNC: 78 MG/DL (ref 65–99)
POTASSIUM SERPL-SCNC: 3.8 MMOL/L (ref 3.5–5.2)
SODIUM SERPL-SCNC: 139 MMOL/L (ref 136–145)

## 2020-03-24 ENCOUNTER — DOCUMENTATION (OUTPATIENT)
Dept: CARDIOLOGY | Facility: HOSPITAL | Age: 65
End: 2020-03-24

## 2020-03-24 ENCOUNTER — TELEPHONE (OUTPATIENT)
Dept: CARDIOLOGY | Facility: HOSPITAL | Age: 65
End: 2020-03-24

## 2020-03-24 NOTE — TELEPHONE ENCOUNTER
On 2/20/2019 patient's creatinine was 1.8.  Lasix was decreased to 40 mg daily with order to repeat labs in 2 weeks.  No lab results have been received.  Call patient and follow-up on lab results

## 2020-03-24 NOTE — TELEPHONE ENCOUNTER
Error on my part.  Lasix was decreased to 20 mg daily.  They are taking correctly.    Labs need to completed if possible the day of f/u with Dr. Guevara

## 2020-03-24 NOTE — TELEPHONE ENCOUNTER
Spoke with Hannah Herrera, Domestic Partner  Relayed message per provider that patient creatine was 1,8 on 2/20/2020 and Lasix was decreased to 40 mg daily and to repeat labs in 2 weeks.  Hannah stated that patient was taking Lasix 20mg daily since released from hospital in Jan 2020.  They received a call after they had labs during that stay regarding kidneys and was told to decrease Lasix to 20mg.  She states they are also scheduled she see Dr. Guevara  On the 25th of this month,

## 2020-03-26 RX ORDER — SPIRONOLACTONE 25 MG/1
TABLET ORAL
Qty: 90 TABLET | Refills: 3 | OUTPATIENT
Start: 2020-03-26

## 2020-03-26 RX ORDER — CARVEDILOL 6.25 MG/1
TABLET ORAL
Qty: 180 TABLET | Refills: 3 | Status: SHIPPED | OUTPATIENT
Start: 2020-03-26 | End: 2021-06-01 | Stop reason: SDUPTHER

## 2020-03-27 ENCOUNTER — OFFICE VISIT (OUTPATIENT)
Dept: CARDIOLOGY | Facility: CLINIC | Age: 65
End: 2020-03-27

## 2020-03-27 ENCOUNTER — TELEPHONE (OUTPATIENT)
Dept: CARDIOLOGY | Facility: HOSPITAL | Age: 65
End: 2020-03-27

## 2020-03-27 ENCOUNTER — LAB REQUISITION (OUTPATIENT)
Dept: LAB | Facility: HOSPITAL | Age: 65
End: 2020-03-27

## 2020-03-27 VITALS
SYSTOLIC BLOOD PRESSURE: 121 MMHG | HEART RATE: 70 BPM | WEIGHT: 183 LBS | HEIGHT: 70 IN | BODY MASS INDEX: 26.2 KG/M2 | DIASTOLIC BLOOD PRESSURE: 71 MMHG

## 2020-03-27 DIAGNOSIS — E78.5 DYSLIPIDEMIA: ICD-10-CM

## 2020-03-27 DIAGNOSIS — I10 ESSENTIAL HYPERTENSION: ICD-10-CM

## 2020-03-27 DIAGNOSIS — N18.30 CKD (CHRONIC KIDNEY DISEASE) STAGE 3, GFR 30-59 ML/MIN (HCC): Primary | ICD-10-CM

## 2020-03-27 DIAGNOSIS — I25.5 ISCHEMIC CARDIOMYOPATHY: Primary | ICD-10-CM

## 2020-03-27 DIAGNOSIS — I25.10 CORONARY ARTERY DISEASE INVOLVING NATIVE CORONARY ARTERY OF NATIVE HEART WITHOUT ANGINA PECTORIS: ICD-10-CM

## 2020-03-27 DIAGNOSIS — Z00.00 ROUTINE GENERAL MEDICAL EXAMINATION AT A HEALTH CARE FACILITY: ICD-10-CM

## 2020-03-27 DIAGNOSIS — N18.30 CHRONIC KIDNEY DISEASE (CKD), STAGE III (MODERATE) (HCC): ICD-10-CM

## 2020-03-27 LAB
ANION GAP SERPL CALCULATED.3IONS-SCNC: 11 MMOL/L (ref 5–15)
BUN BLD-MCNC: 25 MG/DL (ref 8–23)
BUN/CREAT SERPL: 13.2 (ref 7–25)
CALCIUM SPEC-SCNC: 9.5 MG/DL (ref 8.6–10.5)
CHLORIDE SERPL-SCNC: 98 MMOL/L (ref 98–107)
CO2 SERPL-SCNC: 26 MMOL/L (ref 22–29)
CREAT BLD-MCNC: 1.89 MG/DL (ref 0.76–1.27)
GFR SERPL CREATININE-BSD FRML MDRD: 36 ML/MIN/1.73
GLUCOSE BLD-MCNC: 100 MG/DL (ref 65–99)
POTASSIUM BLD-SCNC: 4.9 MMOL/L (ref 3.5–5.2)
SODIUM BLD-SCNC: 135 MMOL/L (ref 136–145)

## 2020-03-27 PROCEDURE — 80048 BASIC METABOLIC PNL TOTAL CA: CPT | Performed by: NURSE PRACTITIONER

## 2020-03-27 PROCEDURE — 93000 ELECTROCARDIOGRAM COMPLETE: CPT | Performed by: INTERNAL MEDICINE

## 2020-03-27 PROCEDURE — 99214 OFFICE O/P EST MOD 30 MIN: CPT | Performed by: INTERNAL MEDICINE

## 2020-03-27 NOTE — TELEPHONE ENCOUNTER
Spoke with patient and he had understanding when to do lab work and had no further questions at this time. Stated he would come back here to do his labs. No questions about his lab results.

## 2020-03-27 NOTE — PROGRESS NOTES
Subjective:     Encounter Date:03/27/2020    Patient ID: Reza Mcclelland is a 65 y.o. single white male, retired [employer] , from [place], [place].       PHYSICIAN: Rolanda Billingsley MD/EMILY Romo  NEUROLOGIST: Jonas Quiroz MD   CARDIOVASCULAR SURGEON: Guillaume Pruitt MD, [facility]  INTERVENTIONAL CARDIOLOGIST: Ismael Nguyen MD, [facility]/ Oliverio Lemus MD, [facility], [facility]/Guillaume Shore MD, [facility]  ELECTROPHYSIOLOGIST: Reza Lopes MD, [facility], [facility]: DAKOTA Peters  ENDOCRINOLOGIST: unknown    Chief Complaint:   Chief Complaint   Patient presents with   • Atrial Fibrillation   • Cardiomyopathy     Problem List:  1. Ischemic heart disease/ischemic cardiomyopathy:  a. Remote non-ST-elevation myocardial infarction with emergent left heart catheterization and successful percutaneous transluminal coronary angioplasty and stenting of the ramus intermedius with a 3.0 x 12 mm Xience drug-eluting stent, 11/19/2008.  b. Left heart catheterization revealing severe stenosis in the LAD of about 80% to 90% in the mid portion with 2.5 x 28 mm, 3.0 x 28 mm, and 3.5 x 18 mm Xience drug-eluting stents in the mid and proximal portion of the LAD, critical left anterior descending stenosis with recommendations to return in 2 weeks for further intervention, 12/04/2008.  c. Left heart catheterization revealing critical right coronary artery stenosis with 3.5 x 28 mm Xience drug-eluting stent deployed in the mid RCA and Xience drug-eluting stent deployed in the proximal RCA, also revealing severe peripheral vascular disease with critical bilateral stenosis of common iliac artery with recommendations for the patient to remain on Plavix and aspirin and further evaluation of iliac stenosis, 12/18/2008.  d. Recurrent CCS class II-III angina with subsequent left heart catheterization and stenting with a 3.5 x 15 mm Xience drug-eluting stent to the  proximal LAD and 2.5 x 18 mm Xience drug-eluting stent into the distal LAD with widely patent stents in the RCA, 90% lesion in the stent of the LAD and widely patent stent of the diagonal branch. LVEF (0.65) with no wall motion abnormalities. Severe peripheral arterial disease with 60% stenosis of the left common iliac artery and 80% to 90% stenosis in the distal common femoral artery with recommendations for abdominal aortogram with right and left lower extremity arteriogram, 01/21/2011.  e. Cardiac catheterization with percutaneous transluminal angioplasty and self-expanding stent placement to the proximal innominate artery, distal embolic protection filter placement in the right carotid artery for cerebral protection during procedure, 10 x 20 mm Xact self-expanding stent deployed to the proximal innominate stenosis, total occlusion of the proximal left internal carotid artery, 11/29/2011.  f. Markedly reduced echocardiographic LVEF (0.25) with left ventricular chamber enlargement and thickened mitral valve leaflets with moderate MR in the absence of pericardial effusion or intracavitary thrombus/mass with markedly abnormal EKG demonstrating LBBB with marked QRS widening, spring 2013.  g. Recurrent progressive chest pain syndrome with severe single-vessel obstructive coronary disease and 100% total occlusion proximal right coronary artery at site of previously placed stent with mild nonobstructive plaque in the left coronary artery system and previously placed stents in the LAD, ramus intermedius artery patent with left ventriculography deferred and moderate bilateral common iliac disease, April 2013, with subsequent bi-ventricular AICD implant ([employer], model #[id number]) by Dr. Kong.  h. Acute non-ST elevation myocardial infarction/left heart catheterization, Dr. Lemus, with Xience drug-eluting stent to the ramus intermedius: Nonobstructive 50% to 60% mid to distal left anterior descending stenosis.  Chronic total occlusion of the dominant right coronary artery served by left-sided collaterals with left ventricular systolic dysfunction. EF 10% to 15%, 08/20/2014.  i. Biventricular ICD interrogation 7/7/17; RA pacing 72%, RV pacing 96% longevity 1.8 years, mode switch less than 1%, longest was 7 hours 5/9/17, 1 noise reversion both channels  j. Residual CCS class I angina pectoris/NYHA class II-III exertional dyspnea and fatigue.   k. Remote apparent acute congestive heart failure with non-STEMI and possible bronchopneumonia with respiratory failure requiring intubation and apparent diagnostic coronary angiography with 3 stents placed with subsequent apparent AMA discharge - data deficit, [facility], [place], [place], November 2017.  l. Residual CCS class I angina pectoris/NYHA class III-IV biventricular CHF, December 2017.  m. Residual class I symptoms, June 2018, October 2018, July 2019 with progressive angina pectoris and abnormal IV Lexiscan Cardiolite study (lateral ischemia with severe reduction in LVEF 0.10) with [facility] demonstrating severe 3-vessel disease with intervention to mid-LCx, February 2020, with residual class I symptoms, March 2020  2. Peripheral vascular disease:  a. Cardiac catheterization revealing total occlusion of left internal carotid artery previously in 2011.  b. CT angiogram of the neck showing total occlusion of the left internal carotid artery, 50% to 60% stenosis of the right carotid bulb, stent in the brachiocephalic artery placed by Dr. Santamaria in November 2011, totally occluded, March 2013.  c. Occluded left carotid artery with occluded innominate artery and intracranial circulation via thyroid artery collaterals and left vertebral with left axillary to right axillary bypass graft using 8 mm. PTFE ringed Propaten graft with arteriogram to the right subclavian artery and right axillary artery and right carotid artery, April 2013.  d. Remote hospitalization, [facility]  [facility] [place], with congestive heart failure and stroke - data deficit (December 2017), with apparent acute injury noted.  3. Cerebrovascular accident and initiation of warfarin therapy, March 2013.  4. Atrial fibrillation on presentation to outside hospital converted to sinus with Cardizem drip and subsequent recurrence of atrial fibrillation while in the ICU, treated with amiodarone and returned to sinus, August 2014.  5. Dyslipidemia.  6. Stage 3 chronic kidney disease.   7. Left bundle branch block, probably combined ischemic/nonischemic cardiomyopathy.  8. Remote acute-on-chronic respiratory failure with hospital admission for pneumonia on ventilator.  9. History of hypertension.  10. Tobacco abuse, previously resolved but recurrent, with cessation using Chantix, winter of 2015, with recent discontinuation, autumn 2019  11. No prior additional surgical intervention.   12. Progressive erectile dysfunction.  13. Serial acceptable AICD interrogations: February 2014; August 2014; March 2015, July 2017, with home Merlin monitoring, with ICD generator change, March 2019  14. Hyperthyroidism with abnormal thyroid ultrasound documenting thyroid nodule-data deficit.  2017     No Known Allergies      Current Outpatient Medications:   •  albuterol (PROVENTIL) (5 MG/ML) 0.5% nebulizer solution, Take 0.5 mL by nebulization Every 6 (Six) Hours As Needed for Wheezing., Disp: 1 mL, Rfl: 12  •  amiodarone (PACERONE) 200 MG tablet, take 1 tablet by mouth once daily (Patient taking differently: Pt taking 1/2 tablet or 100mg Daily), Disp: 90 tablet, Rfl: 2  •  aspirin 81 MG EC tablet, Take 81 mg by mouth Daily., Disp: , Rfl:   •  atorvastatin (LIPITOR) 80 MG tablet, take 1 tablet by mouth at bedtime, Disp: 30 tablet, Rfl: 11  •  budesonide-formoterol (SYMBICORT) 160-4.5 MCG/ACT inhaler, Inhale 2 puffs 2 (Two) Times a Day. Please call primary care doctor for further refills, Disp: 10.2 g, Rfl: 0  •  carvedilol  (COREG) 6.25 MG tablet, TAKE 1 TABLET BY MOUTH TWICE A DAY WITH FOOD, Disp: 180 tablet, Rfl: 3  •  furosemide (LASIX) 20 MG tablet, Take 1 tablet by mouth Daily., Disp: 30 tablet, Rfl: 3  •  lisinopril (PRINIVIL,ZESTRIL) 2.5 MG tablet, take 1 tablet by mouth once daily, Disp: 90 tablet, Rfl: 3  •  nitroglycerin (NITROSTAT) 0.4 MG SL tablet, 1 under the tongue as needed for angina, may repeat q5mins for up three doses, Disp: 100 tablet, Rfl: 11  •  potassium chloride (K-DUR,KLOR-CON) 20 MEQ CR tablet, take 1 tablet by mouth once daily, Disp: 30 tablet, Rfl: 5  •  prasugrel (EFFIENT) 10 MG tablet, Take 1 tablet by mouth Daily., Disp: 90 tablet, Rfl: 3  •  spironolactone (ALDACTONE) 25 MG tablet, take 1 tablet once daily, Disp: 90 tablet, Rfl: 3  •  VENTOLIN  (90 Base) MCG/ACT inhaler, Inhale 2 puffs Every 6 (Six) Hours As Needed for Wheezing., Disp: 1 inhaler, Rfl: 6    HISTORY OF PRESENT ILLNESS: Patient returns for followup after an 8-1/2 month hiatus.  He underwent coronary angiography on 02/13/2020 after undergoing a nuclear stress test which showed significant lateral wall ischemia.  He was a no-show for EPS followup on 02/11/2020.  He says today that he is feeling better since the stent was placed.  He is up and about on a daily basis and has no chest pain, tightness, or pressure with his activities.  He has not had need for nitroglycerin.  His appetite has been good, and he has had no GI or  problems.  He has had no other ER visits, hospitalizations, serious illnesses, or surgeries in the interim.   Patient otherwise denies chest pain, shortness of breath, PND, edema, palpitations, syncope or presyncope at this time on current activity.      ROS   All other systems reviewed and otherwise negative.      ECG 12 Lead  Date/Time: 3/27/2020 10:22 AM  Performed by: Andrea Guevara MD  Authorized by: Andrea Guevara MD   Comparison: compared with previous ECG from 2/12/2020  Similar to previous ECG  Rhythm:  "paced  BPM: 70    Clinical impression: abnormal EKG  Comments: Prolonged AV conduction   ms  QTc 533 ms   ms               Objective:       Vitals:    03/27/20 0954 03/27/20 1007   BP: 119/76 121/71   BP Location: Left arm Left arm   Patient Position: Sitting Standing   Pulse: 70    Weight: 83 kg (183 lb)    Height: 177.8 cm (70\")      Body mass index is 26.26 kg/m².   Last weight:  184 lbs.    Physical Exam   Constitutional: He is oriented to person, place, and time. He appears well-developed and well-nourished.   Neck: No JVD present. Carotid bruit is not present. No thyromegaly present.   Cardiovascular: Regular rhythm, S1 normal and S2 normal. Exam reveals no gallop, no S3 and no friction rub.   Murmur heard.   Medium-pitched early systolic murmur is present with a grade of 2/6 at the upper right sternal border.  Pulses:       Carotid pulses are 1+ on the right side, and 1+ on the left side.       Radial pulses are 1+ on the right side, and 1+ on the left side.        Femoral pulses are 1+ on the right side, and 1+ on the left side.       Popliteal pulses are 1+ on the right side, and 1+ on the left side.        Dorsalis pedis pulses are 1+ on the right side, and 1+ on the left side.        Posterior tibial pulses are 1+ on the right side, and 1+ on the left side.   Pulmonary/Chest: Effort normal. He has decreased breath sounds. He has no wheezes. He has no rhonchi. He has no rales.   Left precordial PCD site is nominal   Abdominal: Soft. He exhibits no mass. There is no hepatosplenomegaly. There is no tenderness. There is no guarding.   Bowel sounds audible x4   Musculoskeletal: Normal range of motion. He exhibits no edema.   Lymphadenopathy:     He has no cervical adenopathy.   Neurological: He is alert and oriented to person, place, and time.   Skin: Skin is warm, dry and intact. No rash noted.   Vitals reviewed.        Lab Review:   Lab Results   Component Value Date    GLUCOSE 78 02/19/2020    " "BUN 24 (H) 02/19/2020    CREATININE 1.80 (H) 02/19/2020    EGFRIFNONA 38 (L) 02/19/2020    BCR 13.3 02/19/2020    CO2 27.0 02/19/2020    CALCIUM 9.4 02/19/2020    ALBUMIN 4.30 02/12/2020    AST 13 02/12/2020    ALT 10 02/12/2020   Sodium - 139  Potassium - 3.8  Chloride - 99    Lab Results   Component Value Date    WBC 9.68 02/13/2020    HGB 12.9 (L) 02/13/2020    HCT 38.3 02/13/2020    MCV 93.4 02/13/2020     02/13/2020       Lab Results   Component Value Date    HGBA1C 5.70 (H) 02/12/2020       Lab Results   Component Value Date    TSH 3.350 05/02/2018       Lab Results   Component Value Date    CHOL 99 02/12/2020    CHOL 126 07/10/2019     Lab Results   Component Value Date    TRIG 73 02/12/2020    TRIG 59 07/10/2019     Lab Results   Component Value Date    HDL 44 02/12/2020    HDL 47 07/10/2019     Lab Results   Component Value Date    LDL 40 02/12/2020    LDL 67 07/10/2019       Lab Results   Component Value Date    .0 (H) 10/10/2018     02/05/2020, stress test with pet:  · Patient denied chest pain or pressure, but reported transient dyspnea ( \"It took my breath away\" ) that quickly resolved.  · WNL room air oximetry before, during, and post study (%).  · AV paced rhythm throughout the study without ectopy.  · BMI 26.3.  · Left ventricular ejection fraction is severely reduced (calculated EF = 10%); WNL TID = 1.05.  · There is no prior study available for comparison. Rest EF: 2%, Stress EF: 10%.  · Diaphragmatic attenuation and GI artifacts are present.  · Myocardial perfusion imaging indicates a medium-sized infarct located in the inferior wall, basal inferior lateral wall, septal wall and apex.  · Myocardial perfusion imaging indicates a medium-to-large-sized, moderate-to-severe area of ischemia located in the apex, inferior wall, lateral wall, basal inferior lateral wall and septal wall (quantitative assessment demonstrates 38% WNL myocardial perfusion with 9% inferolateral \"scar\" and " 53% reversible ischemia involving the inferoapical, anterolateral, inferolateral, and septal wall segments)..  · Impressions are consistent with a high risk study.  · Findings consistent with an abnormal acceptable pharmacologic ECG stress test.  · Abnormal IV Lexiscan hybrid PET cardiac CT stress scan suggestive of advanced ischemic cardiomyopathy with multiple areas of reversible ischemia/scar and marked reduction in calculated global left ventricular ejection fraction (0.10) suggestive of severe three-vessel obstructive coronary artery disease.     02/12/2020, left heart catheterization:  · Distal LAD  in-stent, ramus intermedius ostial  in-stent, RCA ostial   · Significant mid left circumflex stenosis, IFR 0.58 prior to intervention  · Status post successful PCI of the mid LCx with deployment of a 2.25 x 38 mm Xience NACHO postdilated to 2.5 mm, with post intervention IFR 0.98.  · Elevated LVEDP at 23 mmHg        Assessment:   Overall continued acceptable course with no new interim cardiopulmonary complaints with acceptable functional status. We will defer additional diagnostic or therapeutic intervention from a cardiac perspective at this time other than to initiate a trial of Entresto 24/26 bid in an attempt to improve patient's ejection fraction in view of his acceptable systolic blood pressure readings at this time.       Diagnosis Plan   1. Ischemic cardiomyopathy  No recurrent angina pectoris or CHF on current activity schedule; continue current treatment     2. Coronary artery disease involving native coronary artery of native heart without angina pectoris  No recurrent angina pectoris or CHF on current activity schedule; continue current treatment     3. Essential hypertension  Controlled; see below   4. Dyslipidemia  Acceptable control; continue atorvastatin   5. Chronic kidney disease (CKD), stage III (moderate) (CMS/HCC)  Will need repeat BMP in the next 2-3 weeks          Plan:          1. Patient to continue current medications and close follow up with the above providers other than to initiate Entresto 24/26 bid after holding lisinopril for 2 days and update us in 1-2 weeks concerning his symptoms and blood pressure.  2. Patient needs EP followup in June 2020, if possible.  3. Tentative cardiology follow up in August or September 2020, or patient may return sooner PRN.    Transcribed by Jovanna Lo for Dr. Andrea Guevara at 10:16 AM on 03/27/2020    I, Andrea Guevara MD, [facility], personally performed the services described in this documentation as scribed by the above named individual in my presence, and it is both accurate and complete. At 10:22 AM on 03/27/2020

## 2020-03-27 NOTE — TELEPHONE ENCOUNTER
I have reviewed his lab results.  His kidney function did not improved, but did not worsen either.    I read in Dr. Caceres note that he plans to stop Lisinopril for 2 days and then start enstreso twice a day.      Pt will need to repeat lab work 1-2 weeks after being on enstresto. Ordered in Epic.  Please mail order if needed.

## 2020-04-07 ENCOUNTER — DOCUMENTATION (OUTPATIENT)
Dept: CARDIOLOGY | Facility: CLINIC | Age: 65
End: 2020-04-07

## 2020-04-07 NOTE — PROGRESS NOTES
PA Case: [id number]   Status: Approved  Coverage Starts on: 1/1/2020 12:00:00 AM  Coverage Ends on: 12/31/2020 12:00:00 AM.

## 2020-04-28 ENCOUNTER — DOCUMENTATION (OUTPATIENT)
Dept: CARDIOLOGY | Facility: HOSPITAL | Age: 65
End: 2020-04-28

## 2020-04-28 ENCOUNTER — TELEPHONE (OUTPATIENT)
Dept: CARDIOLOGY | Facility: HOSPITAL | Age: 65
End: 2020-04-28

## 2020-05-06 ENCOUNTER — LAB REQUISITION (OUTPATIENT)
Dept: LAB | Facility: HOSPITAL | Age: 65
End: 2020-05-06

## 2020-05-06 ENCOUNTER — TELEPHONE (OUTPATIENT)
Dept: CARDIOLOGY | Facility: HOSPITAL | Age: 65
End: 2020-05-06

## 2020-05-06 DIAGNOSIS — Z00.00 ROUTINE GENERAL MEDICAL EXAMINATION AT A HEALTH CARE FACILITY: ICD-10-CM

## 2020-05-06 LAB
ANION GAP SERPL CALCULATED.3IONS-SCNC: 13 MMOL/L (ref 5–15)
BUN BLD-MCNC: 22 MG/DL (ref 8–23)
BUN/CREAT SERPL: 14.1 (ref 7–25)
CALCIUM SPEC-SCNC: 9.4 MG/DL (ref 8.6–10.5)
CHLORIDE SERPL-SCNC: 101 MMOL/L (ref 98–107)
CO2 SERPL-SCNC: 25 MMOL/L (ref 22–29)
CREAT BLD-MCNC: 1.56 MG/DL (ref 0.76–1.27)
GFR SERPL CREATININE-BSD FRML MDRD: 45 ML/MIN/1.73
GLUCOSE BLD-MCNC: 101 MG/DL (ref 65–99)
POTASSIUM BLD-SCNC: 4.2 MMOL/L (ref 3.5–5.2)
SODIUM BLD-SCNC: 139 MMOL/L (ref 136–145)

## 2020-05-06 PROCEDURE — 80048 BASIC METABOLIC PNL TOTAL CA: CPT | Performed by: NURSE PRACTITIONER

## 2020-05-06 NOTE — TELEPHONE ENCOUNTER
Called and reviewed lab results.      Kidney function improved.  Creatine 1.56 improved from 1.89 on 03/27/20    Back to baseline 1.5-1.6.  Continue to monitor.

## 2020-05-19 ENCOUNTER — TELEPHONE (OUTPATIENT)
Dept: CARDIOLOGY | Facility: CLINIC | Age: 65
End: 2020-05-19

## 2020-05-19 DIAGNOSIS — J96.20 ACUTE ON CHRONIC RESPIRATORY FAILURE, UNSPECIFIED WHETHER WITH HYPOXIA OR HYPERCAPNIA (HCC): ICD-10-CM

## 2020-05-19 RX ORDER — SPIRONOLACTONE 25 MG/1
25 TABLET ORAL DAILY
Qty: 90 TABLET | Refills: 1 | Status: SHIPPED | OUTPATIENT
Start: 2020-05-19 | End: 2020-11-17 | Stop reason: SDUPTHER

## 2020-05-19 RX ORDER — AMIODARONE HYDROCHLORIDE 100 MG/1
100 TABLET ORAL DAILY
Qty: 90 TABLET | Refills: 1 | Status: SHIPPED | OUTPATIENT
Start: 2020-05-19 | End: 2020-07-16

## 2020-05-19 NOTE — TELEPHONE ENCOUNTER
Pt's girlfriend, Hannah called and requested refills pr spironolactone, amiodarone and albuterol nebulizer solution.       Albuterol nebulizer solution was originally prescribed by DAKOTA Acevedo 12/18/17 during hospitalization, hasn't been refilled since.       OK to fill albuterol nebulizer solution?    Please advise.

## 2020-06-29 ENCOUNTER — OFFICE VISIT (OUTPATIENT)
Dept: CARDIOLOGY | Facility: CLINIC | Age: 65
End: 2020-06-29

## 2020-06-29 VITALS
TEMPERATURE: 97.8 F | BODY MASS INDEX: 25.2 KG/M2 | DIASTOLIC BLOOD PRESSURE: 64 MMHG | SYSTOLIC BLOOD PRESSURE: 116 MMHG | WEIGHT: 176 LBS | HEART RATE: 70 BPM | HEIGHT: 70 IN

## 2020-06-29 DIAGNOSIS — Z95.810 CARDIAC RESYNCHRONIZATION THERAPY DEFIBRILLATOR (CRT-D) IN PLACE: Primary | ICD-10-CM

## 2020-06-29 DIAGNOSIS — I25.5 ISCHEMIC CARDIOMYOPATHY: ICD-10-CM

## 2020-06-29 DIAGNOSIS — I48.0 PAROXYSMAL ATRIAL FIBRILLATION (HCC): ICD-10-CM

## 2020-06-29 PROCEDURE — 99214 OFFICE O/P EST MOD 30 MIN: CPT | Performed by: PHYSICIAN ASSISTANT

## 2020-06-29 PROCEDURE — 93284 PRGRMG EVAL IMPLANTABLE DFB: CPT | Performed by: PHYSICIAN ASSISTANT

## 2020-06-29 NOTE — PROGRESS NOTES
"    Reza Mcclelland  1955  PCP: Pearl Sabillon PA    SUBJECTIVE:   Reza Mcclelland is a 65 y.o. male seen for a follow up visit regarding the following:     Chief Complaint: Follow up for afib, biv icd     HPI:    Since last visit the patient has undergone PCI and is doing well since procedure in February. He stated he is feeling much better since starting Entresto. He can occassionally feel his heart \"beat irregular\", but is not overly bothered by this. He is very eager to discontinue Amiodarone today. No cp, sob, edema.     History:  This is a 64-year-old patient followed by Dr. Andrea Guevara.  He has a history of extensive coronary artery disease and chronic systolic heart failure.  He has a previously implanted biventricular ICD.  He continues to have class II to class III angina symptoms. He has quit smoking        Cardiac PMH: (Old records have been reviewed and summarized below)  1. Ischemic heart disease/ischemic cardiomyopathy:  a. Remote non-ST-elevation myocardial infarction with emergent left heart catheterization and successful percutaneous transluminal coronary angioplasty and stenting of the ramus intermedius with a 3.0 x 12 mm Xience drug-eluting stent, 11/19/2008.  b. Left heart catheterization revealing severe stenosis in the LAD of about 80% to 90% in the mid portion with 2.5 x 28 mm, 3.0 x 28 mm, and 3.5 x 18 mm Xience drug-eluting stents in the mid and proximal portion of the LAD, critical left anterior descending stenosis with recommendations to return in 2 weeks for further intervention, 12/04/2008.  c. Left heart catheterization revealing critical right coronary artery stenosis with 3.5 x 28 mm Xience drug-eluting stent deployed in the mid RCA and Xience drug-eluting stent deployed in the proximal RCA, also revealing severe peripheral vascular disease with critical bilateral stenosis of common iliac artery with recommendations for the patient to remain on Plavix and aspirin and " further evaluation of iliac stenosis, 12/18/2008.  d. Recurrent CCS class II-III angina with subsequent left heart catheterization and stenting with a 3.5 x 15 mm Xience drug-eluting stent to the proximal LAD and 2.5 x 18 mm Xience drug-eluting stent into the distal LAD with widely patent stents in the RCA, 90% lesion in the stent of the LAD and widely patent stent of the diagonal branch. LVEF (0.65) with no wall motion abnormalities. Severe peripheral arterial disease with 60% stenosis of the left common iliac artery and 80% to 90% stenosis in the distal common femoral artery with recommendations for abdominal aortogram with right and left lower extremity arteriogram, 01/21/2011.  e. Cardiac catheterization with percutaneous transluminal angioplasty and self-expanding stent placement to the proximal innominate artery, distal embolic protection filter placement in the right carotid artery for cerebral protection during procedure, 10 x 20 mm Xact self-expanding stent deployed to the proximal innominate stenosis, total occlusion of the proximal left internal carotid artery, 11/29/2011.  f. Markedly reduced echocardiographic LVEF (0.25) with left ventricular chamber enlargement and thickened mitral valve leaflets with moderate MR in the absence of pericardial effusion or intracavitary thrombus/mass with markedly abnormal EKG demonstrating LBBB with marked QRS widening, spring 2013.  g. Recurrent progressive chest pain syndrome with severe single-vessel obstructive coronary disease and 100% total occlusion proximal right coronary artery at site of previously placed stent with mild nonobstructive plaque in the left coronary artery system and previously placed stents in the LAD, ramus intermedius artery patent with left ventriculography deferred and moderate bilateral common iliac disease, April 2013, with subsequent bi-ventricular AICD implant ([employer], model #[id number]) by Dr. Kong.  h. Acute non-ST  elevation myocardial infarction/left heart catheterization, Dr. Lemus, with Xience drug-eluting stent to the ramus intermedius: Nonobstructive 50% to 60% mid to distal left anterior descending stenosis. Chronic total occlusion of the dominant right coronary artery served by left-sided collaterals with left ventricular systolic dysfunction. EF 10% to 15%, 08/20/2014.  i. Biventricular ICD interrogation 7/7/17; RA pacing 72%, RV pacing 96% longevity 1.8 years, mode switch less than 1%, longest was 7 hours 5/9/17, 1 noise reversion both channels  j. Residual CCS class I angina pectoris/NYHA class II-III exertional dyspnea and fatigue.   k. Recent apparent acute congestive heart failure with non-STEMI and possible bronchopneumonia with respiratory failure requiring intubation and apparent diagnostic coronary angiography with 3 stents placed with subsequent apparent AMA discharge - data deficit, [facility], [place], [place], November 2017.  l. Residual CCS class I angina pectoris/NYHA class III-IV biventricular CHF, December 2017.  m. Residual class I symptoms, June 2018  2. Peripheral vascular disease:  a. Cardiac catheterization revealing total occlusion of left  internal carotid artery previously in 2011.  b. CT angiogram of the neck showing total occlusion of the left internal carotid artery, 50% to 60% stenosis of the right carotid bulb, stent in the brachiocephalic artery placed by Dr. Santamaria in November 2011, totally occluded, March 2013.  c. Occluded left carotid artery with occluded innominate artery and intracranial circulation via thyroid artery collaterals and left vertebral with left axillary to right axillary bypass graft using 8 mm. PTFE ringed Propaten graft with arteriogram to the right subclavian artery and right axillary artery and right carotid artery, April 2013.  d. Remote hospitalization, [facility], with congestive heart failure and stroke - data  deficit (December 2017), with apparent acute injury noted.  e. Cerebrovascular accident and initiation of warfarin  therapy, March 2013.  3. Atrial fibrillation on presentation to outside hospital converted to sinus with Cardizem drip and subsequent recurrence of atrial fibrillation while in the ICU, treated with amiodarone and returned to sinus, August 2014.  4. Dyslipidemia.  5. Stage 3 chronic kidney disease.   6. Left bundle branch block, probably combined ischemic/nonischemic cardiomyopathy.  7. Remote acute-on-chronic respiratory failure with hospital admission for pneumonia on ventilator.  8. History of hypertension.  9. Tobacco abuse, previously resolved but recurrent, with cessation using Chantix, winter of 2015.  10. No prior additional surgical intervention.   11. Progressive erectile dysfunction.  12. Serial acceptable AICD interrogations: February 2014; August 2014; March 2015, July 2017, with home Merlin monitoring.  13. Hyperthyroidism with abnormal thyroid ultrasound documenting thyroid nodule-data deficit.  2017      Current Outpatient Medications:   •  amiodarone (PACERONE) 100 MG tablet, Take 1 tablet by mouth Daily., Disp: 90 tablet, Rfl: 1  •  aspirin 81 MG EC tablet, Take 81 mg by mouth Daily., Disp: , Rfl:   •  atorvastatin (LIPITOR) 80 MG tablet, take 1 tablet by mouth at bedtime, Disp: 30 tablet, Rfl: 11  •  carvedilol (COREG) 6.25 MG tablet, TAKE 1 TABLET BY MOUTH TWICE A DAY WITH FOOD, Disp: 180 tablet, Rfl: 3  •  nitroglycerin (NITROSTAT) 0.4 MG SL tablet, 1 under the tongue as needed for angina, may repeat q5mins for up three doses, Disp: 100 tablet, Rfl: 11  •  potassium chloride (K-DUR,KLOR-CON) 20 MEQ CR tablet, take 1 tablet by mouth once daily, Disp: 30 tablet, Rfl: 5  •  prasugrel (EFFIENT) 10 MG tablet, Take 1 tablet by mouth Daily., Disp: 90 tablet, Rfl: 3  •  sacubitril-valsartan (ENTRESTO) 24-26 MG tablet, Take 1 tablet by mouth 2 (Two) Times a Day., Disp: 180 tablet, Rfl: 3  •   spironolactone (ALDACTONE) 25 MG tablet, Take 1 tablet by mouth Daily., Disp: 90 tablet, Rfl: 1  •  VENTOLIN  (90 Base) MCG/ACT inhaler, Inhale 2 puffs Every 6 (Six) Hours As Needed for Wheezing., Disp: 1 inhaler, Rfl: 6  •  albuterol (PROVENTIL) (5 MG/ML) 0.5% nebulizer solution, Take 0.5 mL by nebulization Every 6 (Six) Hours As Needed for Wheezing., Disp: 1 mL, Rfl: 12    Past Medical History, Past Surgical History, Family history, Social History, and Medications were all reviewed with the patient today and updated as necessary.       Patient Active Problem List   Diagnosis   • Coronary artery disease involving native coronary artery of native heart without angina pectoris   • Ischemic cardiomyopathy   • Peripheral vascular disease (CMS/HCC)   • Cerebrovascular accident (CMS/HCC)   • Paroxysmal atrial fibrillation (CMS/HCC)   • Hyperlipidemia LDL goal <70   • Chronic kidney disease (CKD), stage III (moderate) (CMS/HCC)   • Left bundle branch block   • Essential hypertension   • COPD   • Cardiac resynchronization therapy defibrillator (CRT-D) in place   • Carotid occlusion, left   • Leukocytosis   • Silent aspiration   • Dyslipidemia     No Known Allergies  Past Medical History:   Diagnosis Date   • Acute on chronic respiratory failure (CMS/HCC)     Recent acute-on-chronic respiratory failure with hospital admission for pneumonia on ventilator.   • AICD (automatic cardioverter/defibrillator) present    • Atrial fibrillation (CMS/HCC)     Atrial fibrillation on presentation to outside hospital converted to sinus with Cardizem drip and subsequent recurrence of atrial fibrillation while in the ICU, treated with amiodarone and returned to sinus, August 2014.   • CAD (coronary artery disease)    • Cerebrovascular accident (CMS/HCC)     Cerebrovascular accident and initiation of warfarin therapy, March 2013.   • Chronic kidney disease (CKD), stage III (moderate) (CMS/HCC)    • COPD (chronic obstructive pulmonary  disease) (CMS/HCC)    • Dyslipidemia    • History of hypertension    • Ischemic cardiomyopathy    • Ischemic heart disease    • Left bundle branch block     Left bundle branch block, probably combined ischemic/nonischemic cardiomyopathy.   • Peripheral vascular disease (CMS/HCC)      Past Surgical History:   Procedure Laterality Date   • AXILLARY AXILLARY BYPASS     • CARDIAC CATHETERIZATION      stents   • CARDIAC CATHETERIZATION N/A 2020    Procedure: Left Heart Cath;  Surgeon: Guillaume Shore MD;  Location:  [facility] CATH [facility]ASIVE [facility];  Service: Cardiology;  Laterality: N/A;   • CARDIAC DEFIBRILLATOR PLACEMENT     • CARDIAC ELECTROPHYSIOLOGY PROCEDURE N/A 3/13/2019    Procedure: BIV ICD generator change- SJM in 4-6 weeks.;  Surgeon: Reza Lopes MD;  Location:  [facility] EP INVASIVE LOCATION;  Service: Cardiology   • CAROTID STENT     • INSERT / REPLACE / REMOVE PACEMAKER       Family History   Problem Relation Age of Onset   • Dementia Mother    • Prostate cancer Father    • Heart disease Father    • Cancer Brother         kidney   • No Known Problems Sister    • Heart failure Maternal Grandmother    • Heart disease Maternal Grandmother    • Heart attack Maternal Grandmother    • Heart disease Maternal Grandfather    • Heart attack Maternal Grandfather    • No Known Problems Paternal Grandmother    • Heart attack Paternal Grandfather    • No Known Problems Sister    • Heart attack Paternal Uncle    • Heart disease Paternal Uncle      Social History     Tobacco Use   • Smoking status: Former Smoker     Packs/day: 1.00     Years: 48.00     Pack years: 48.00     Types: Cigarettes     Last attempt to quit: 2017     Years since quittin.5   • Smokeless tobacco: Never Used   • Tobacco comment: Pt quit smoking 6-7 months ago   Substance Use Topics   • Alcohol use: No         PHYSICAL EXAM:    /64 (BP Location: Left arm, Patient Position: Sitting)   Pulse 70   Temp 97.8 °F (36.6 °C)   Ht  "177.8 cm (70\")   Wt 79.8 kg (176 lb)   BMI 25.25 kg/m²        Wt Readings from Last 5 Encounters:   06/29/20 79.8 kg (176 lb)   03/27/20 83 kg (183 lb)   02/19/20 83.6 kg (184 lb 6 oz)   02/12/20 83.7 kg (184 lb 8.4 oz)   02/05/20 83.2 kg (183 lb 6.8 oz)       BP Readings from Last 5 Encounters:   06/29/20 116/64   03/27/20 121/71   02/19/20 110/59   02/13/20 107/75   01/24/20 124/69       General-Well Nourished, Well developed  Eyes - PERRLA  Neck- supple, No mass  CV- regular rate and rhythm, no MRG, No edema  Lung- clear bilaterally  Abd- soft, +BS  Musc/skel - Norm strength and range of motion  Skin- warm and dry  Neuro - Alert & Oriented x 3, appropriate mood.        Medical problems and test results were reviewed with the patient today.     No results found for this or any previous visit (from the past 672 hour(s)).      EKG: (EKG has been independently visualized by me and summarized below)      ECG 12 Lead  Date/Time: 6/29/2020 11:47 AM  Performed by: Celia Vela PA  Authorized by: Celia Vela PA   Comparison: compared with previous ECG from 3/27/2020  Similar to previous ECG  Comparison to previous ECG: AV paced   Rhythm: paced  Rate: normal  BPM: 70    Clinical impression: abnormal EKG             ASSESSMENT and PLAN    1.  Chronic systolic heart failure-appears euvolemic today. Continue optimal rx with Coreg, Entresto, ALdactone.   2.  Biventricular ICD-we'll monitor in device clinic. Post Gen Change in March 2019   3.  Atrial Fibrillation - Current burden is 1.3% - Because of bleeding had to stop Eliquis 2.5mg BID. Dr. Lopes had planned to discontinue 6 months prior, but patient missed follow-up. He is very eager to discontinue today for fear of side effects so will proceed. He understands he could have increased burden. We discussed use of NOAC and he declines and fully understands his risk of stroke. Monitor burden of afib.   4.  Use of Amio - will discontinue today with low afib burden " and patient wishing to discontinue.   5. CAD- recent NACHO to mid left circumflex 2/2020. Noted to have significant CAD unamendable to revascularization. Continue DAPT, statin.       Return in about 3 months (around 9/29/2020) for MARLO w/ Dr. Lopes .        Celia Vela PA-C   Cardiology/Electrophysiology  6/29/2020  11:21

## 2020-07-04 ENCOUNTER — RESULTS ENCOUNTER (OUTPATIENT)
Dept: CARDIOLOGY | Facility: CLINIC | Age: 65
End: 2020-07-04

## 2020-07-04 DIAGNOSIS — I48.0 PAROXYSMAL ATRIAL FIBRILLATION (HCC): ICD-10-CM

## 2020-07-15 NOTE — PROGRESS NOTES
[facility]      Encounter Date:07/16/2020     Reza Mcclelland  [address] [place] [address]  [unfilled]    1955    Pearl Sabillon PA    Reza Mccellland is a 65 y.o. male.      Subjective:     Chief Complaint:  Congestive Heart Failure and Follow-up       HPI     65-year-old male with ischemic heart disease/cardiomyopathy, peripheral vascular disease, atrial fibrillation, CVA, dyslipidemia, chronic kidney disease stage III, left bundle branch block, hypertension, remote tobacco abuse, biventricular ICD.    Echocardiogram 1/22/2020: Severe dilated LV with EF 10 to 15%, aortic root mildly dilated 4.1, ascending aorta mildly dilated 3.8.  Left heart catheterization 2/12/2020: With drug-eluting stent to the left circumflex.  Significant CAD and amendable to revascularization.  Currently on Entresto, Coreg, Aldactone.  A. fib burden monitor check 6/29/2020: 1.3% Eliquis discontinued due to history of bleeding.  Amiodarone discontinued with low A. fib burden on pacemaker check.    Pt denies CP, pressure, dyspnea, dizziness, syncope, edema, palpitations.  Still easy bruising easily off Eliquis.  Continued on effient and asa.     Patient Active Problem List    Diagnosis Date Noted   • Dyslipidemia 03/27/2020   • Leukocytosis 12/16/2017   • Silent aspiration 12/16/2017   • COPD 12/15/2017   • Cardiac resynchronization therapy defibrillator (CRT-D) in place 12/15/2017   • Carotid occlusion, left 12/15/2017   • Coronary artery disease involving native coronary artery of native heart without angina pectoris      Note Last Updated: 2/13/2020     · Cardiac catheterization for NSTEMI (11/19/2008): PCI of ramus a 3.0 x 12 mm Xience drug-eluting stent, 11/19/2008.  · Cardiac catheterization (12/4/2008): PCI to mid and proximal LAD.  Sure staged PCI of RCA planned  · Cardiac catheterization (12/18/2008): PCI to RCA.    · Cardiac catheterization for recurrent CCS class II-III  angina  (01/21/2011): NACHO to proximal and distal LAD.  Widely patent stents in RCA.  LVEF normal at 65%.    · Cardiac catheterization for recurrent angina (4/2013): Stable CAD with RCA occlusion and patent stents in the left coronary system.  · Cardiac cath for NSTEMI  (08/20/2014): NACHO to the ramus intermedius:  Nonobstructive 50% to 60% mid to distal left anterior descending stenosis. Chronic total occlusion of the dominant right coronary artery served by left-sided collaterals with left ventricular systolic dysfunction. LVEF 15%.  · Cardiac PET scheduled 2/5/2020: EF 10%.  Reversible ischemia  · [facility] 2-12-20: Distal LAD  in-stent, ramus intermedius ostial  in-stent, RCA ostial . Significant mid left circumflex stenosis, IFR 0.58 prior to intervention Status post successful PCI of the mid RCA with deployment of a 2.25 x 38 mm Xience NACHO      • Ischemic cardiomyopathy      Note Last Updated: 2/12/2020       · CRT-D implant ([employer], model #[id number]) by Dr. Kong.  · LVEF 10% to 15% noted on cardiac cath 08/20/2014.  · Echocardiogram 1/22/2020 ([facility]): EF 10 to 15%, diffuse hypokinesis, severely dilated LV, moderate LA dilation, aortic root mildly dilated 4.1, a sending aorta mildly dilated 3.8 cm     • Peripheral vascular disease (CMS/HCC)      Note Last Updated: 2/12/2020       · Occluded left carotid artery with occluded innominate artery and intracranial circulation via thyroid artery collaterals and left vertebral with left axillary to right axillary bypass graft using 8 mm. PTFE ringed Propaten graft with arteriogram to the right subclavian artery and right axillary artery and right carotid artery, April 2013.         • Cerebrovascular accident (CMS/HCC)    • Paroxysmal atrial fibrillation (CMS/HCC)      Note Last Updated: 2/13/2020     · Atrial fibrillation on presentation to outside hospital converted to sinus with Cardizem drip and subsequent recurrence of atrial  fibrillation while in the ICU, treated with amiodarone and returned to sinus, August 2014.  · Chads Vasc=7,      • Hyperlipidemia LDL goal <70    • Chronic kidney disease (CKD), stage III (moderate) (CMS/HCC)    • Left bundle branch block    • Essential hypertension          Past Surgical History:   Procedure Laterality Date   • AXILLARY AXILLARY BYPASS     • CARDIAC CATHETERIZATION      stents   • CARDIAC CATHETERIZATION N/A 2/12/2020    Procedure: Left Heart Cath;  Surgeon: Guillaume Shore MD;  Location:  [facility];  Service: Cardiology;  Laterality: N/A;   • CARDIAC DEFIBRILLATOR PLACEMENT     • CARDIAC ELECTROPHYSIOLOGY PROCEDURE N/A 3/13/2019    Procedure: BIV ICD generator change- SJM in 4-6 weeks.;  Surgeon: Reza Lopes MD;  Location:  [facility] EP INVASIVE LOCATION;  Service: Cardiology   • CAROTID STENT     • INSERT / REPLACE / REMOVE PACEMAKER         No Known Allergies      Current Outpatient Medications:   •  albuterol (PROVENTIL) (5 MG/ML) 0.5% nebulizer solution, Take 0.5 mL by nebulization Every 6 (Six) Hours As Needed for Wheezing., Disp: 1 mL, Rfl: 12  •  aspirin 81 MG EC tablet, Take 81 mg by mouth Daily., Disp: , Rfl:   •  atorvastatin (LIPITOR) 80 MG tablet, take 1 tablet by mouth at bedtime, Disp: 30 tablet, Rfl: 11  •  carvedilol (COREG) 6.25 MG tablet, TAKE 1 TABLET BY MOUTH TWICE A DAY WITH FOOD, Disp: 180 tablet, Rfl: 3  •  nitroglycerin (NITROSTAT) 0.4 MG SL tablet, 1 under the tongue as needed for angina, may repeat q5mins for up three doses, Disp: 100 tablet, Rfl: 11  •  potassium chloride (K-DUR,KLOR-CON) 20 MEQ CR tablet, take 1 tablet by mouth once daily, Disp: 30 tablet, Rfl: 5  •  prasugrel (EFFIENT) 10 MG tablet, Take 1 tablet by mouth Daily., Disp: 90 tablet, Rfl: 3  •  sacubitril-valsartan (ENTRESTO) 24-26 MG tablet, Take 1 tablet by mouth 2 (Two) Times a Day., Disp: 180 tablet, Rfl: 3  •  spironolactone (ALDACTONE) 25 MG tablet, Take 1 tablet by mouth  "Daily., Disp: 90 tablet, Rfl: 1  •  VENTOLIN  (90 Base) MCG/ACT inhaler, Inhale 2 puffs Every 6 (Six) Hours As Needed for Wheezing., Disp: 1 inhaler, Rfl: 6    The following portions of the patient's history were reviewed and updated today during office visit as appropriate: allergies, current medications, past family history, past medical history, past social history, past surgical history and problem list.    Review of Systems   Respiratory: Positive for shortness of breath (rare dyspnea).    Hematologic/Lymphatic: Bruises/bleeds easily.   All other systems reviewed and are negative.      Objective:     Vitals:    07/16/20 1340   BP: 102/56   BP Location: Left arm   Patient Position: Sitting   Cuff Size: Adult   Pulse: 73   Resp: 20   Temp: 97.3 °F (36.3 °C)   TempSrc: Temporal   SpO2: 99%   Weight: 81.4 kg (179 lb 6 oz)   Height: 177.8 cm (70\")         Physical Exam   Constitutional: He is oriented to person, place, and time. He appears well-developed and well-nourished. No distress.   HENT:   Mouth/Throat: Oropharynx is clear and moist.   Eyes: No scleral icterus.   Neck: No hepatojugular reflux and no JVD present. Carotid bruit is not present.   Cardiovascular: Normal rate, regular rhythm and intact distal pulses. Exam reveals no friction rub.   Pulmonary/Chest: Effort normal and breath sounds normal.   Abdominal: Soft. Bowel sounds are normal. He exhibits no distension. There is no tenderness.   Musculoskeletal: He exhibits no edema.   Neurological: He is alert and oriented to person, place, and time.   Skin: Skin is warm, dry and intact. Bruising (bilateral arms. ) noted. No rash noted. No cyanosis or erythema. No pallor.   Bruising bilaterl arms.    Psychiatric: He has a normal mood and affect. His behavior is normal. Thought content normal.   Vitals reviewed.      Lab and Diagnostic Review:  Lab Results   Component Value Date    WBC 9.68 02/13/2020    HGB 12.9 (L) 02/13/2020    HCT 38.3 02/13/2020    " MCV 93.4 02/13/2020     02/13/2020     Lab Results   Component Value Date    GLUCOSE 101 (H) 05/06/2020    CALCIUM 9.4 05/06/2020     05/06/2020    K 4.2 05/06/2020    CO2 25.0 05/06/2020     05/06/2020    BUN 22 05/06/2020    CREATININE 1.56 (H) 05/06/2020    EGFRIFNONA 45 (L) 05/06/2020    BCR 14.1 05/06/2020    ANIONGAP 13.0 05/06/2020       Assessment and Plan:         1. Chronic systolic congestive heart failure (CMS/HCC)  Aldactone, coreg, enstresto    2. Ischemic cardiomyopathy  Asa, effient, statin    3. Paroxysmal atrial fibrillation (CMS/HCC)  Off Amio, no palps  No Eliquis due to bleeding and other anticoagulation.     4. CKD (chronic kidney disease) stage 3, GFR 30-59 ml/min (CMS/[facility])  F/u with labs as ordered.     F/u [facility] 6 months or sooner if needed         *Please note that portions of this note were completed with a voice recognition program. Efforts were made to edit the dictations, but occasionally words are mistranscribed.

## 2020-07-16 ENCOUNTER — OFFICE VISIT (OUTPATIENT)
Dept: CARDIOLOGY | Facility: HOSPITAL | Age: 65
End: 2020-07-16

## 2020-07-16 VITALS
BODY MASS INDEX: 25.68 KG/M2 | DIASTOLIC BLOOD PRESSURE: 56 MMHG | TEMPERATURE: 97.3 F | OXYGEN SATURATION: 99 % | WEIGHT: 179.38 LBS | HEIGHT: 70 IN | SYSTOLIC BLOOD PRESSURE: 102 MMHG | HEART RATE: 73 BPM | RESPIRATION RATE: 20 BRPM

## 2020-07-16 DIAGNOSIS — N18.30 CKD (CHRONIC KIDNEY DISEASE) STAGE 3, GFR 30-59 ML/MIN (HCC): ICD-10-CM

## 2020-07-16 DIAGNOSIS — I25.5 ISCHEMIC CARDIOMYOPATHY: ICD-10-CM

## 2020-07-16 DIAGNOSIS — I48.0 PAROXYSMAL ATRIAL FIBRILLATION (HCC): ICD-10-CM

## 2020-07-16 DIAGNOSIS — I50.22 CHRONIC SYSTOLIC CONGESTIVE HEART FAILURE (HCC): Primary | ICD-10-CM

## 2020-07-16 PROCEDURE — 99214 OFFICE O/P EST MOD 30 MIN: CPT | Performed by: NURSE PRACTITIONER

## 2020-07-29 ENCOUNTER — TELEPHONE (OUTPATIENT)
Dept: CARDIOLOGY | Facility: CLINIC | Age: 65
End: 2020-07-29

## 2020-07-29 NOTE — TELEPHONE ENCOUNTER
Called pt due to Merlin home monitor didn't send in scheduled reading.  Left message for a return call.

## 2020-08-17 RX ORDER — ATORVASTATIN CALCIUM 80 MG/1
80 TABLET, FILM COATED ORAL
Qty: 90 TABLET | Refills: 1 | Status: SHIPPED | OUTPATIENT
Start: 2020-08-17 | End: 2021-01-22 | Stop reason: SDUPTHER

## 2020-09-09 NOTE — PROGRESS NOTES
Subjective:     Encounter Date:09/11/2020    Patient ID: Reza Mcclelland is a 65 y.o. single white male, retired [employer] , from [place], [place].       PHYSICIAN: Rolanda Billingsley MD/EMILY Romo  NEUROLOGIST: Jonas Quiroz MD   CARDIOVASCULAR SURGEON: Guillaume Pruitt MD, [facility]  INTERVENTIONAL CARDIOLOGIST: Ismael Nguyen MD, [facility]/ Oliverio Lemus MD, [facility], [facility]/Guillaume Shore MD, [facility]  ELECTROPHYSIOLOGIST: Reza Lopes MD, [facility], [facility]: DAKOTA Peters  ENDOCRINOLOGIST: unknown    Chief Complaint:   Chief Complaint   Patient presents with   • Cardiomyopathy     f/u       Problem List:  1. Ischemic heart disease/ischemic cardiomyopathy:  a. Remote non-ST-elevation myocardial infarction with emergent left heart catheterization and successful percutaneous transluminal coronary angioplasty and stenting of the ramus intermedius with a 3.0 x 12 mm Xience drug-eluting stent, 11/19/2008.  b. Left heart catheterization revealing severe stenosis in the LAD of about 80% to 90% in the mid portion with 2.5 x 28 mm, 3.0 x 28 mm, and 3.5 x 18 mm Xience drug-eluting stents in the mid and proximal portion of the LAD, critical left anterior descending stenosis with recommendations to return in 2 weeks for further intervention, 12/04/2008.  c. Left heart catheterization revealing critical right coronary artery stenosis with 3.5 x 28 mm Xience drug-eluting stent deployed in the mid RCA and Xience drug-eluting stent deployed in the proximal RCA, also revealing severe peripheral vascular disease with critical bilateral stenosis of common iliac artery with recommendations for the patient to remain on Plavix and aspirin and further evaluation of iliac stenosis, 12/18/2008.  d. Recurrent CCS class II-III angina with subsequent left heart catheterization and stenting with a 3.5 x 15 mm Xience drug-eluting stent to the proximal LAD and 2.5  x 18 mm Xience drug-eluting stent into the distal LAD with widely patent stents in the RCA, 90% lesion in the stent of the LAD and widely patent stent of the diagonal branch. LVEF (0.65) with no wall motion abnormalities. Severe peripheral arterial disease with 60% stenosis of the left common iliac artery and 80% to 90% stenosis in the distal common femoral artery with recommendations for abdominal aortogram with right and left lower extremity arteriogram, 01/21/2011.  e. Cardiac catheterization with percutaneous transluminal angioplasty and self-expanding stent placement to the proximal innominate artery, distal embolic protection filter placement in the right carotid artery for cerebral protection during procedure, 10 x 20 mm Xact self-expanding stent deployed to the proximal innominate stenosis, total occlusion of the proximal left internal carotid artery, 11/29/2011.  f. Markedly reduced echocardiographic LVEF (0.25) with left ventricular chamber enlargement and thickened mitral valve leaflets with moderate MR in the absence of pericardial effusion or intracavitary thrombus/mass with markedly abnormal EKG demonstrating LBBB with marked QRS widening, spring 2013.  g. Recurrent progressive chest pain syndrome with severe single-vessel obstructive coronary disease and 100% total occlusion proximal right coronary artery at site of previously placed stent with mild nonobstructive plaque in the left coronary artery system and previously placed stents in the LAD, ramus intermedius artery patent with left ventriculography deferred and moderate bilateral common iliac disease, April 2013, with subsequent bi-ventricular AICD implant ([employer], model #[id number]) by Dr. Kong.  h. Acute non-ST elevation myocardial infarction/left heart catheterization, Dr. Lemus, with Xience drug-eluting stent to the ramus intermedius: Nonobstructive 50% to 60% mid to distal left anterior descending stenosis. Chronic total  occlusion of the dominant right coronary artery served by left-sided collaterals with left ventricular systolic dysfunction. EF 10% to 15%, 08/20/2014.  i. Biventricular ICD interrogation 7/7/17; RA pacing 72%, RV pacing 96% longevity 1.8 years, mode switch less than 1%, longest was 7 hours 5/9/17, 1 noise reversion both channels  j. Residual CCS class I angina pectoris/NYHA class II-III exertional dyspnea and fatigue.   k. Remote apparent acute congestive heart failure with non-STEMI and possible bronchopneumonia with respiratory failure requiring intubation and apparent diagnostic coronary angiography with 3 stents placed with subsequent apparent AMA discharge - data deficit, [facility], [place], [place], November 2017.  l. Residual CCS class I angina pectoris/NYHA class III-IV biventricular CHF, December 2017.  m. Residual class I symptoms, June 2018, October 2018, July 2019 with progressive angina pectoris and abnormal IV Lexiscan Cardiolite study (lateral ischemia with severe reduction in LVEF 0.10) with [facility] demonstrating severe 3-vessel disease with intervention to mid-LCx, February 2020, with residual class I symptoms, March 2020, September 2020.  2. Peripheral vascular disease:  a. Cardiac catheterization revealing total occlusion of left internal carotid artery previously in 2011.  b. CT angiogram of the neck showing total occlusion of the left internal carotid artery, 50% to 60% stenosis of the right carotid bulb, stent in the brachiocephalic artery placed by Dr. Santamaria in November 2011, totally occluded, March 2013.  c. Occluded left carotid artery with occluded innominate artery and intracranial circulation via thyroid artery collaterals and left vertebral with left axillary to right axillary bypass graft using 8 mm. PTFE ringed Propaten graft with arteriogram to the right subclavian artery and right axillary artery and right carotid artery, April 2013.  d. Remote hospitalization,  [facility], with congestive heart failure and stroke - data deficit (December 2017), with apparent acute injury noted.  3. Cerebrovascular accident and initiation of warfarin therapy, March 2013.  4. Atrial fibrillation on presentation to outside hospital converted to sinus with Cardizem drip and subsequent recurrence of atrial fibrillation while in the ICU, treated with amiodarone and returned to sinus, August 2014.  5. Dyslipidemia.  6. Stage 3 chronic kidney disease.   7. Left bundle branch block, probably combined ischemic/nonischemic cardiomyopathy.  8. Remote acute-on-chronic respiratory failure with hospital admission for pneumonia on ventilator.  9. History of hypertension.  10. Tobacco abuse, previously resolved but recurrent, with cessation using Chantix, winter of 2015, with recent discontinuation, autumn 2019  11. No prior additional surgical intervention.   12. Progressive erectile dysfunction.  13. Serial acceptable AICD interrogations: February 2014; August 2014; March 2015, July 2017, with home Merlin monitoring, with ICD generator change, March 2019  14. Hyperthyroidism with abnormal thyroid ultrasound documenting thyroid nodule-data deficit.  2017    No Known Allergies    Current Outpatient Medications:   •  albuterol (PROVENTIL) (5 MG/ML) 0.5% nebulizer solution, Take 0.5 mL by nebulization Every 6 (Six) Hours As Needed for Wheezing., Disp: 1 mL, Rfl: 12  •  aspirin 81 MG EC tablet, Take 81 mg by mouth Daily., Disp: , Rfl:   •  atorvastatin (LIPITOR) 80 MG tablet, Take 1 tablet by mouth every night at bedtime., Disp: 90 tablet, Rfl: 1  •  carvedilol (COREG) 6.25 MG tablet, TAKE 1 TABLET BY MOUTH TWICE A DAY WITH FOOD, Disp: 180 tablet, Rfl: 3  •  nitroglycerin (NITROSTAT) 0.4 MG SL tablet, 1 under the tongue as needed for angina, may repeat q5mins for up three doses, Disp: 100 tablet, Rfl: 11  •  potassium chloride (K-DUR,KLOR-CON) 20 MEQ CR tablet, take 1 tablet by mouth  "once daily, Disp: 30 tablet, Rfl: 5  •  prasugrel (EFFIENT) 10 MG tablet, Take 1 tablet by mouth Daily., Disp: 90 tablet, Rfl: 3  •  sacubitril-valsartan (ENTRESTO) 24-26 MG tablet, Take 1 tablet by mouth 2 (Two) Times a Day., Disp: 180 tablet, Rfl: 3  •  spironolactone (ALDACTONE) 25 MG tablet, Take 1 tablet by mouth Daily., Disp: 90 tablet, Rfl: 1  •  VENTOLIN  (90 Base) MCG/ACT inhaler, Inhale 2 puffs Every 6 (Six) Hours As Needed for Wheezing., Disp: 1 inhaler, Rfl: 6    History of Present Illness: Patient returns for a scheduled 5-month follow up. He has a cabin near [facility] and has been spending time there. Since last visit, he has been feeling well overall from a cardiovascular standpoint. He states that he feels extremely better since starting Entresto. He has not had to use any nitroglycerin. Patient denies chest pain, shortness of breath, PND, edema, palpitations, syncope, or presyncope at this time. He has had no interim ER visits, hospitalizations, serious illnesses, or surgeries. He plans to receive an influenza vaccine next week. Laboratory studies from May 2020 reviewed with him in office today.        ROS   Obtained and negative except as outlined in problem list and HPI.    Procedures       Objective:       Vitals:    09/11/20 1430 09/11/20 1432   BP: 92/63 (!) 86/64   BP Location: Right arm Left arm   Patient Position: Sitting Standing   Pulse: 71 74   Weight: 79.9 kg (176 lb 3.2 oz)    Height: 177.8 cm (70\")      Body mass index is 25.28 kg/m².  Last weight: 183 lbs.    Physical Exam   Constitutional: He is oriented to person, place, and time. He appears well-developed and well-nourished.   Neck: No JVD present. Carotid bruit is not present. No thyromegaly present.   Cardiovascular: Regular rhythm, S1 normal and S2 normal. Exam reveals no gallop, no S3 and no friction rub.   Murmur heard.   Medium-pitched early systolic murmur is present with a grade of 2/6 at the lower left sternal " border.  Pulses:       Dorsalis pedis pulses are 1+ on the right side, and 1+ on the left side.        Posterior tibial pulses are 1+ on the right side, and 1+ on the left side.   Pulmonary/Chest: Effort normal. He has decreased breath sounds. He has no wheezes. He has no rhonchi. He has no rales.   Left precordial PCD site nominal.    Abdominal: Soft. He exhibits no mass. There is no hepatosplenomegaly. There is no tenderness. There is no guarding.   Musculoskeletal: Normal range of motion. He exhibits no edema.   Lymphadenopathy:     He has no cervical adenopathy.   Neurological: He is alert and oriented to person, place, and time.   Skin: Skin is warm, dry and intact. No rash noted.   Vitals reviewed.        Lab Review:   Lab Results   Component Value Date    GLUCOSE 101 (H) 05/06/2020    BUN 22 05/06/2020    CREATININE 1.56 (H) 05/06/2020    EGFRIFNONA 45 (L) 05/06/2020    BCR 14.1 05/06/2020     05/06/2020    K 4.2 05/06/2020     05/06/2020    CO2 25.0 05/06/2020    CALCIUM 9.4 05/06/2020    ALBUMIN 4.30 02/12/2020    AST 13 02/12/2020    ALT 10 02/12/2020       Lab Results   Component Value Date    WBC 9.68 02/13/2020    HGB 12.9 (L) 02/13/2020    HCT 38.3 02/13/2020    MCV 93.4 02/13/2020     02/13/2020     Lab Results   Component Value Date    HGBA1C 5.70 (H) 02/12/2020     Lab Results   Component Value Date    TSH 3.350 05/02/2018     Lab Results   Component Value Date    CHOL 99 02/12/2020    CHOL 126 07/10/2019    CHOL 141 10/10/2018     Lab Results   Component Value Date    TRIG 73 02/12/2020    TRIG 59 07/10/2019    TRIG 70 10/10/2018     Lab Results   Component Value Date    HDL 44 02/12/2020    HDL 47 07/10/2019    HDL 37 (L) 10/10/2018     Lab Results   Component Value Date    LDL 40 02/12/2020    LDL 67 07/10/2019     10/10/2018     SJM Remote, 05/09/2020-08/07/2020 (91 days):  · Battery at 73% (3.58 years left)  · Measured voltage of 2.95  · Capacitor charge time is 8.9  seconds  · Normal device function  · No alerts or events        Assessment:       Overall continued acceptable course with no new interim cardiopulmonary complaints with acceptable functional status. We will defer additional diagnostic or therapeutic intervention from a cardiac perspective at this time.     Diagnosis Plan   1. Ischemic cardiomyopathy  No recurrent angina pectoris or CHF on current activity schedule; continue current treatment.   2. Coronary artery disease involving native coronary artery of native heart without angina pectoris  No recurrent angina pectoris or CHF on current activity schedule; continue current treatment     3. Essential hypertension  Acceptable control; continue current treatment.    4. Hyperlipidemia LDL goal <70  Acceptable control; continue atorvastatin 80 mg   5. Chronic kidney disease (CKD), stage III (moderate) (CMS/[facility])  Stable GFR; continue current treatment.          Plan:         1. Patient to continue current medications and close follow up with the above providers.  2. Tentative cardiology follow up in February or March 2021 or patient may return sooner PRN.      Scribed for Andrea Guevara MD by Miriam Michel. 9/11/2020  15:01      I, Andrea Guevara MD, [facility], personally performed the services described in this documentation as scribed by the above named individual in my presence, and it is both accurate and complete. At 2:41 PM on 09/11/2020

## 2020-09-11 ENCOUNTER — OFFICE VISIT (OUTPATIENT)
Dept: CARDIOLOGY | Facility: CLINIC | Age: 65
End: 2020-09-11

## 2020-09-11 VITALS
WEIGHT: 176.2 LBS | HEART RATE: 74 BPM | HEIGHT: 70 IN | DIASTOLIC BLOOD PRESSURE: 64 MMHG | BODY MASS INDEX: 25.22 KG/M2 | SYSTOLIC BLOOD PRESSURE: 86 MMHG

## 2020-09-11 DIAGNOSIS — I10 ESSENTIAL HYPERTENSION: ICD-10-CM

## 2020-09-11 DIAGNOSIS — E78.5 HYPERLIPIDEMIA LDL GOAL <70: ICD-10-CM

## 2020-09-11 DIAGNOSIS — N18.30 CHRONIC KIDNEY DISEASE (CKD), STAGE III (MODERATE) (HCC): ICD-10-CM

## 2020-09-11 DIAGNOSIS — I25.10 CORONARY ARTERY DISEASE INVOLVING NATIVE CORONARY ARTERY OF NATIVE HEART WITHOUT ANGINA PECTORIS: ICD-10-CM

## 2020-09-11 DIAGNOSIS — I25.5 ISCHEMIC CARDIOMYOPATHY: Primary | ICD-10-CM

## 2020-09-11 PROCEDURE — 99214 OFFICE O/P EST MOD 30 MIN: CPT | Performed by: INTERNAL MEDICINE

## 2020-10-20 ENCOUNTER — OFFICE VISIT (OUTPATIENT)
Dept: CARDIOLOGY | Facility: CLINIC | Age: 65
End: 2020-10-20

## 2020-10-20 VITALS
WEIGHT: 180.2 LBS | HEIGHT: 70 IN | SYSTOLIC BLOOD PRESSURE: 130 MMHG | DIASTOLIC BLOOD PRESSURE: 70 MMHG | OXYGEN SATURATION: 99 % | HEART RATE: 69 BPM | BODY MASS INDEX: 25.8 KG/M2

## 2020-10-20 DIAGNOSIS — I50.22 CHRONIC SYSTOLIC CONGESTIVE HEART FAILURE (HCC): Primary | ICD-10-CM

## 2020-10-20 DIAGNOSIS — Z79.899 LONG TERM CURRENT USE OF ANTIARRHYTHMIC MEDICAL THERAPY: ICD-10-CM

## 2020-10-20 DIAGNOSIS — I48.0 PAROXYSMAL ATRIAL FIBRILLATION (HCC): ICD-10-CM

## 2020-10-20 PROCEDURE — 93284 PRGRMG EVAL IMPLANTABLE DFB: CPT | Performed by: INTERNAL MEDICINE

## 2020-10-20 PROCEDURE — 99214 OFFICE O/P EST MOD 30 MIN: CPT | Performed by: INTERNAL MEDICINE

## 2020-10-20 RX ORDER — AMIODARONE HYDROCHLORIDE 200 MG/1
200 TABLET ORAL DAILY
COMMUNITY
End: 2020-10-20

## 2020-10-20 NOTE — PROGRESS NOTES
Reza Mcclelland  1955  PCP: Pearl Sabillon PA    SUBJECTIVE:   Reza Mcclelland is a 65 y.o. male seen for a follow up visit regarding the following:     Chief Complaint: Follow up for afib, biv icd     HPI:    Since last visit the patient had been in the hospital for pneumonia. His Amio had been stopped but was restarted when he was at [facility]     History:  This is a 65-year-old patient followed by Dr. Andrea Guevara.  He has a history of extensive coronary artery disease and chronic systolic heart failure.  He has a previously implanted biventricular ICD.  He continues to have class II to class III angina symptoms. He has quit smoking        Cardiac PMH: (Old records have been reviewed and summarized below)  1. Ischemic heart disease/ischemic cardiomyopathy:  a. Remote non-ST-elevation myocardial infarction with emergent left heart catheterization and successful percutaneous transluminal coronary angioplasty and stenting of the ramus intermedius with a 3.0 x 12 mm Xience drug-eluting stent, 11/19/2008.  b. Left heart catheterization revealing severe stenosis in the LAD of about 80% to 90% in the mid portion with 2.5 x 28 mm, 3.0 x 28 mm, and 3.5 x 18 mm Xience drug-eluting stents in the mid and proximal portion of the LAD, critical left anterior descending stenosis with recommendations to return in 2 weeks for further intervention, 12/04/2008.  c. Left heart catheterization revealing critical right coronary artery stenosis with 3.5 x 28 mm Xience drug-eluting stent deployed in the mid RCA and Xience drug-eluting stent deployed in the proximal RCA, also revealing severe peripheral vascular disease with critical bilateral stenosis of common iliac artery with recommendations for the patient to remain on Plavix and aspirin and further evaluation of iliac stenosis, 12/18/2008.  d. Recurrent CCS class II-III angina with subsequent left heart catheterization and stenting with a 3.5 x 15 mm Xience  drug-eluting stent to the proximal LAD and 2.5 x 18 mm Xience drug-eluting stent into the distal LAD with widely patent stents in the RCA, 90% lesion in the stent of the LAD and widely patent stent of the diagonal branch. LVEF (0.65) with no wall motion abnormalities. Severe peripheral arterial disease with 60% stenosis of the left common iliac artery and 80% to 90% stenosis in the distal common femoral artery with recommendations for abdominal aortogram with right and left lower extremity arteriogram, 01/21/2011.  e. Cardiac catheterization with percutaneous transluminal angioplasty and self-expanding stent placement to the proximal innominate artery, distal embolic protection filter placement in the right carotid artery for cerebral protection during procedure, 10 x 20 mm Xact self-expanding stent deployed to the proximal innominate stenosis, total occlusion of the proximal left internal carotid artery, 11/29/2011.  f. Markedly reduced echocardiographic LVEF (0.25) with left ventricular chamber enlargement and thickened mitral valve leaflets with moderate MR in the absence of pericardial effusion or intracavitary thrombus/mass with markedly abnormal EKG demonstrating LBBB with marked QRS widening, spring 2013.  g. Recurrent progressive chest pain syndrome with severe single-vessel obstructive coronary disease and 100% total occlusion proximal right coronary artery at site of previously placed stent with mild nonobstructive plaque in the left coronary artery system and previously placed stents in the LAD, ramus intermedius artery patent with left ventriculography deferred and moderate bilateral common iliac disease, April 2013, with subsequent bi-ventricular AICD implant ([employer] [facility], model #[id number]) by Dr. Kong.  h. Acute non-ST elevation myocardial infarction/left heart catheterization, Dr. Lemus, with Xience drug-eluting stent to the ramus intermedius: Nonobstructive 50% to 60% mid to distal left  anterior descending stenosis. Chronic total occlusion of the dominant right coronary artery served by left-sided collaterals with left ventricular systolic dysfunction. EF 10% to 15%, 08/20/2014.  i. Biventricular ICD interrogation 7/7/17; RA pacing 72%, RV pacing 96% longevity 1.8 years, mode switch less than 1%, longest was 7 hours 5/9/17, 1 noise reversion both channels  j. Residual CCS class I angina pectoris/NYHA class II-III exertional dyspnea and fatigue.   k. Recent apparent acute congestive heart failure with non-STEMI and possible bronchopneumonia with respiratory failure requiring intubation and apparent diagnostic coronary angiography with 3 stents placed with subsequent apparent AMA discharge - data deficit, [facility], [place], [place], November 2017.  l. Residual CCS class I angina pectoris/NYHA class III-IV biventricular CHF, December 2017.  m. Residual class I symptoms, June 2018  2. Peripheral vascular disease:  a. Cardiac catheterization revealing total occlusion of left  internal carotid artery previously in 2011.  b. CT angiogram of the neck showing total occlusion of the left internal carotid artery, 50% to 60% stenosis of the right carotid bulb, stent in the brachiocephalic artery placed by Dr. Satnamaria in November 2011, totally occluded, March 2013.  c. Occluded left carotid artery with occluded innominate artery and intracranial circulation via thyroid artery collaterals and left vertebral with left axillary to right axillary bypass graft using 8 mm. PTFE ringed Propaten graft with arteriogram to the right subclavian artery and right axillary artery and right carotid artery, April 2013.  d. Remote hospitalization, [facility], with congestive heart failure and stroke - data deficit (December 2017), with apparent acute injury noted.  e. Cerebrovascular accident and initiation of warfarin  therapy, March 2013.  3. Atrial fibrillation on  presentation to outside hospital converted to sinus with Cardizem drip and subsequent recurrence of atrial fibrillation while in the ICU, treated with amiodarone and returned to sinus, August 2014.  4. Dyslipidemia.  5. Stage 3 chronic kidney disease.   6. Left bundle branch block, probably combined ischemic/nonischemic cardiomyopathy.  7. Remote acute-on-chronic respiratory failure with hospital admission for pneumonia on ventilator.  8. History of hypertension.  9. Tobacco abuse, previously resolved but recurrent, with cessation using Chantix, winter of 2015.  10. No prior additional surgical intervention.   11. Progressive erectile dysfunction.  12. Serial acceptable AICD interrogations: February 2014; August 2014; March 2015, July 2017, with home [facility] monitoring.  13. Hyperthyroidism with abnormal thyroid ultrasound documenting thyroid nodule-data deficit.  2017      Current Outpatient Medications:   •  albuterol (PROVENTIL) (5 MG/ML) 0.5% nebulizer solution, Take 0.5 mL by nebulization Every 6 (Six) Hours As Needed for Wheezing., Disp: 1 mL, Rfl: 12  •  atorvastatin (LIPITOR) 80 MG tablet, Take 1 tablet by mouth every night at bedtime., Disp: 90 tablet, Rfl: 1  •  carvedilol (COREG) 6.25 MG tablet, TAKE 1 TABLET BY MOUTH TWICE A DAY WITH FOOD, Disp: 180 tablet, Rfl: 3  •  nitroglycerin (NITROSTAT) 0.4 MG SL tablet, 1 under the tongue as needed for angina, may repeat q5mins for up three doses, Disp: 100 tablet, Rfl: 11  •  potassium chloride (K-DUR,KLOR-CON) 20 MEQ CR tablet, take 1 tablet by mouth once daily, Disp: 30 tablet, Rfl: 5  •  prasugrel (EFFIENT) 10 MG tablet, Take 1 tablet by mouth Daily., Disp: 90 tablet, Rfl: 3  •  sacubitril-valsartan (ENTRESTO) 24-26 MG tablet, Take 1 tablet by mouth 2 (Two) Times a Day., Disp: 180 tablet, Rfl: 3  •  spironolactone (ALDACTONE) 25 MG tablet, Take 1 tablet by mouth Daily., Disp: 90 tablet, Rfl: 1  •  VENTOLIN  (90 Base) MCG/ACT inhaler, Inhale 2 puffs  Every 6 (Six) Hours As Needed for Wheezing., Disp: 1 inhaler, Rfl: 6  •  apixaban (ELIQUIS) 5 MG tablet tablet, Take 5 mg by mouth 2 (Two) Times a Day., Disp: , Rfl:     Past Medical History, Past Surgical History, Family history, Social History, and Medications were all reviewed with the patient today and updated as necessary.       Patient Active Problem List   Diagnosis   • Coronary artery disease involving native coronary artery of native heart without angina pectoris   • Ischemic cardiomyopathy   • Peripheral vascular disease (CMS/HCC)   • Cerebrovascular accident (CMS/HCC)   • Paroxysmal atrial fibrillation (CMS/HCC)   • Hyperlipidemia LDL goal <70   • Chronic kidney disease (CKD), stage III (moderate)   • Left bundle branch block   • Essential hypertension   • COPD   • Cardiac resynchronization therapy defibrillator (CRT-D) in place   • Carotid occlusion, left   • Leukocytosis   • Silent aspiration   • Dyslipidemia     No Known Allergies  Past Medical History:   Diagnosis Date   • Acute on chronic respiratory failure (CMS/HCC)     Recent acute-on-chronic respiratory failure with hospital admission for pneumonia on ventilator.   • AICD (automatic cardioverter/defibrillator) present    • Atrial fibrillation (CMS/HCC)     Atrial fibrillation on presentation to outside hospital converted to sinus with Cardizem drip and subsequent recurrence of atrial fibrillation while in the ICU, treated with amiodarone and returned to sinus, August 2014.   • CAD (coronary artery disease)    • Cerebrovascular accident (CMS/HCC)     Cerebrovascular accident and initiation of warfarin therapy, March 2013.   • Chronic kidney disease (CKD), stage III (moderate)    • COPD (chronic obstructive pulmonary disease) (CMS/HCC)    • Dyslipidemia    • History of hypertension    • Ischemic cardiomyopathy    • Ischemic heart disease    • Left bundle branch block     Left bundle branch block, probably combined ischemic/nonischemic  "cardiomyopathy.   • Peripheral vascular disease (CMS/HCC)      Past Surgical History:   Procedure Laterality Date   • AXILLARY AXILLARY BYPASS     • CARDIAC CATHETERIZATION      stents   • CARDIAC CATHETERIZATION N/A 2020    Procedure: Left Heart Cath;  Surgeon: Guillaume Shore MD;  Location:  [facility] CATH [facility]AS[facility];  Service: Cardiology;  Laterality: N/A;   • CARDIAC DEFIBRILLATOR PLACEMENT     • CARDIAC ELECTROPHYSIOLOGY PROCEDURE N/A 3/13/2019    Procedure: BIV ICD generator change- SJM in 4-6 weeks.;  Surgeon: Reza Lopes MD;  Location:  [facility] EP INVASIVE LOCATION;  Service: Cardiology   • CAROTID STENT     • INSERT / REPLACE / REMOVE PACEMAKER       Family History   Problem Relation Age of Onset   • Dementia Mother    • Prostate cancer Father    • Heart disease Father    • Cancer Brother         kidney   • No Known Problems Sister    • Heart failure Maternal Grandmother    • Heart disease Maternal Grandmother    • Heart attack Maternal Grandmother    • Heart disease Maternal Grandfather    • Heart attack Maternal Grandfather    • No Known Problems Paternal Grandmother    • Heart attack Paternal Grandfather    • No Known Problems Sister    • Heart attack Paternal Uncle    • Heart disease Paternal Uncle      Social History     Tobacco Use   • Smoking status: Former Smoker     Packs/day: 1.00     Years: 48.00     Pack years: 48.00     Types: Cigarettes     Quit date: 2017     Years since quittin.8   • Smokeless tobacco: Never Used   • Tobacco comment: Pt quit smoking 6-7 months ago   Substance Use Topics   • Alcohol use: No         PHYSICAL EXAM:    /70 (BP Location: Left arm, Patient Position: Sitting)   Pulse 69   Ht 177.8 cm (70\")   Wt 81.7 kg (180 lb 3.2 oz)   SpO2 99%   BMI 25.86 kg/m²        Wt Readings from Last 5 Encounters:   10/20/20 81.7 kg (180 lb 3.2 oz)   20 79.9 kg (176 lb 3.2 oz)   20 81.4 kg (179 lb 6 oz)   20 79.8 kg (176 lb)   20 " 83 kg (183 lb)       BP Readings from Last 5 Encounters:   10/20/20 130/70   09/11/20 (!) 86/64   07/16/20 102/56   06/29/20 116/64   03/27/20 121/71       General-Well Nourished, Well developed  Eyes - PERRLA  Neck- supple, No mass  CV- regular rate and rhythm, no MRG, No edema  Lung- clear bilaterally  Abd- soft, +BS  Musc/skel - Norm strength and range of motion  Skin- warm and dry  Neuro - Alert & Oriented x 3, appropriate mood.        Medical problems and test results were reviewed with the patient today.     No results found for this or any previous visit (from the past 672 hour(s)).      EKG: (EKG has been independently visualized by me and summarized below)      ECG 12 Lead    Date/Time: 10/20/2020 11:52 AM  Performed by: Reza Lopes MD  Authorized by: Reza Lopes MD   Comparison: compared with previous ECG   Similar to previous ECG  Rhythm: sinus rhythm and paced  Rate: normal  Pacing: dual chamber pacing  Clinical impression: abnormal EKG             ASSESSMENT and PLAN    1.  Chronic systolic heart failure-appears euvolemic today. Continue optimal rx with Coreg, Entresto, Aldactone.   2.  Biventricular ICD-we'll monitor in device clinic. Post Gen Change in March 2019   3.  Atrial Fibrillation - Current burden was 1.3% but went back to 10% when in the hospital for pneumonia - Because of bleeding in the past has Eliquis 2.5mg BID had been stopped. He now has restarted 5mg BID. Will Stop Amio today. Stable rates and symptoms during A. Fib  4.  Use of Amio - will discontinue today .   5.  CAD- recent NACHO to mid left circumflex 2/2020. Noted to have significant CAD unamendable to revascularization. Continue Effient, statin. Dr. Guevara/Patrick are addressing      Return in about 3 months (around 1/20/2021) for office visit and device check with [facility].        Reza Lopes M.D., F.VIVIEN.C.C, F.H.R.S.  Cardiology/Electrophysiology  10/20/2020  11:53 EDT

## 2020-11-18 RX ORDER — SPIRONOLACTONE 25 MG/1
25 TABLET ORAL DAILY
Qty: 90 TABLET | Refills: 1 | Status: SHIPPED | OUTPATIENT
Start: 2020-11-18 | End: 2021-05-24

## 2020-12-21 RX ORDER — PRASUGREL 10 MG/1
10 TABLET, FILM COATED ORAL DAILY
Qty: 30 TABLET | Refills: 5 | Status: SHIPPED | OUTPATIENT
Start: 2020-12-21 | End: 2021-12-29

## 2021-01-04 NOTE — PROGRESS NOTES
Subjective:     Encounter Date:01/05/2021      Patient ID: Reza Mcclelland is a 65 y.o. single white male, retired [employer] , from [place], [place].       PHYSICIAN: Rolanda Billingsley MD/EMILY Romo  NEUROLOGIST: Jonas Quiroz MD   CARDIOVASCULAR SURGEON: Guillaume Pruitt MD, formerly [facility]  INTERVENTIONAL CARDIOLOGIST: Ismael Nguyen MD, [facility]io Lemus [facility], [facility], [facility]/Guillaume Shore MD, [facility]  ELECTROPHYSIOLOGIST: Reza Lopes MD, [facility], [facility]: DAKOTA Peters  ENDOCRINOLOGIST: unknown .    Chief Complaint:   Chief Complaint   Patient presents with   • Cardiomyopathy   • Hyperlipidemia     Problem List:  1. Ischemic heart disease/ischemic cardiomyopathy:  a. Remote non-ST-elevation myocardial infarction with emergent left heart catheterization and successful percutaneous transluminal coronary angioplasty and stenting of the ramus intermedius with a 3.0 x 12 mm Xience drug-eluting stent, 11/19/2008.  b. Left heart catheterization revealing severe stenosis in the LAD of about 80% to 90% in the mid portion with 2.5 x 28 mm, 3.0 x 28 mm, and 3.5 x 18 mm Xience drug-eluting stents in the mid and proximal portion of the LAD, critical left anterior descending stenosis with recommendations to return in 2 weeks for further intervention, 12/04/2008.  c. Left heart catheterization revealing critical right coronary artery stenosis with 3.5 x 28 mm Xience drug-eluting stent deployed in the mid RCA and Xience drug-eluting stent deployed in the proximal RCA, also revealing severe peripheral vascular disease with critical bilateral stenosis of common iliac artery with recommendations for the patient to remain on Plavix and aspirin and further evaluation of iliac stenosis, 12/18/2008.  d. Recurrent CCS class II-III angina with subsequent left heart catheterization and stenting with a 3.5 x 15 mm Xience drug-eluting stent to the proximal  LAD and 2.5 x 18 mm Xience drug-eluting stent into the distal LAD with widely patent stents in the RCA, 90% lesion in the stent of the LAD and widely patent stent of the diagonal branch. LVEF (0.65) with no wall motion abnormalities. Severe peripheral arterial disease with 60% stenosis of the left common iliac artery and 80% to 90% stenosis in the distal common femoral artery with recommendations for abdominal aortogram with right and left lower extremity arteriogram, 01/21/2011.  e. Cardiac catheterization with percutaneous transluminal angioplasty and self-expanding stent placement to the proximal innominate artery, distal embolic protection filter placement in the right carotid artery for cerebral protection during procedure, 10 x 20 mm Xact self-expanding stent deployed to the proximal innominate stenosis, total occlusion of the proximal left internal carotid artery, 11/29/2011.  f. Markedly reduced echocardiographic LVEF (0.25) with left ventricular chamber enlargement and thickened mitral valve leaflets with moderate MR in the absence of pericardial effusion or intracavitary thrombus/mass with markedly abnormal EKG demonstrating LBBB with marked QRS widening, spring 2013.  g. Recurrent progressive chest pain syndrome with severe single-vessel obstructive coronary disease and 100% total occlusion proximal right coronary artery at site of previously placed stent with mild nonobstructive plaque in the left coronary artery system and previously placed stents in the LAD, ramus intermedius artery patent with left ventriculography deferred and moderate bilateral common iliac disease, April 2013, with subsequent bi-ventricular AICD implant ([employer], model #[id number]) by Dr. Kong.  h. Acute non-ST elevation myocardial infarction/left heart catheterization, Dr. Lemus, with Xience drug-eluting stent to the ramus intermedius: Nonobstructive 50% to 60% mid to distal left anterior descending stenosis. Chronic  total occlusion of the dominant right coronary artery served by left-sided collaterals with left ventricular systolic dysfunction. EF 10% to 15%, 08/20/2014.  i. Biventricular ICD interrogation 7/7/17; RA pacing 72%, RV pacing 96% longevity 1.8 years, mode switch less than 1%, longest was 7 hours 5/9/17, 1 noise reversion both channels  j. Residual CCS class I angina pectoris/NYHA class II-III exertional dyspnea and fatigue.   k. Remote apparent acute congestive heart failure with non-STEMI and possible bronchopneumonia with respiratory failure requiring intubation and apparent diagnostic coronary angiography with 3 stents placed with subsequent apparent AMA discharge - data deficit, [facility], [place], [place], November 2017.  l. Residual CCS class I angina pectoris/NYHA class III-IV biventricular CHF, December 2017.  m. Residual class I symptoms, June 2018, October 2018, July 2019 with progressive angina pectoris and abnormal IV Lexiscan Cardiolite study (lateral ischemia with severe reduction in LVEF 0.10) with [facility] demonstrating severe 3-vessel disease with intervention to mid-LCx, February 2020, with residual class I symptoms, March 2020, September 2020, January 2021.   n. Remote [employer] device check November 2020 demonstrated battery 68%, 1% atrial fib burden, 99% BiV pacing, 85% atrial pacing, with one episode of nonsustained V. tach  2. Peripheral vascular disease:  a. Cardiac catheterization revealing total occlusion of left internal carotid artery previously in 2011.  b. CT angiogram of the neck showing total occlusion of the left internal carotid artery, 50% to 60% stenosis of the right carotid bulb, stent in the brachiocephalic artery placed by Dr. Santamaria in November 2011, totally occluded, March 2013.  c. Occluded left carotid artery with occluded innominate artery and intracranial circulation via thyroid artery collaterals and left vertebral with left axillary to right  axillary bypass graft using 8 mm. PTFE ringed Propaten graft with arteriogram to the right subclavian artery and right axillary artery and right carotid artery, April 2013.  d. Remote hospitalization, [facility], with congestive heart failure and stroke - data deficit (December 2017), with apparent acute injury noted.  3. Cerebrovascular accident and initiation of warfarin therapy, March 2013.  4. Atrial fibrillation on presentation to outside hospital converted to sinus with Cardizem drip and subsequent recurrence of atrial fibrillation while in the ICU, treated with amiodarone and returned to sinus, August 2014.  5. Dyslipidemia.  6. Stage 3 chronic kidney disease.   7. Left bundle branch block, probably combined ischemic/nonischemic cardiomyopathy.  8. Remote acute-on-chronic respiratory failure with hospital admission for pneumonia on ventilator.  9. History of hypertension.  10. Tobacco abuse, previously resolved but recurrent, with cessation using Chantix, winter of 2015, with recent discontinuation, autumn 2019  11. No prior additional surgical intervention.   12. Progressive erectile dysfunction.  13. Serial acceptable AICD interrogations: February 2014; August 2014; March 2015, July 2017, with home Merlin monitoring, with ICD generator change, March 2019  14. Hyperthyroidism with abnormal thyroid ultrasound documenting thyroid nodule-data deficit.  2017  15. [facility] hospitalization 10/9/2020-10/13/2020 for aspiration pneumonia requiring intubation  16.  [facility] overnight hospitalization December 2020 for non-STEMI    No Known Allergies    Current Outpatient Medications:   •  albuterol (PROVENTIL) (5 MG/ML) 0.5% nebulizer solution, Take 0.5 mL by nebulization Every 6 (Six) Hours As Needed for Wheezing., Disp: 1 mL, Rfl: 12  •  apixaban (ELIQUIS) 5 MG tablet tablet, Take 1 tablet by mouth Every 12 (Twelve) Hours., Disp: 60  tablet, Rfl: 5  •  atorvastatin (LIPITOR) 80 MG tablet, Take 1 tablet by mouth every night at bedtime., Disp: 90 tablet, Rfl: 1  •  carvedilol (COREG) 6.25 MG tablet, TAKE 1 TABLET BY MOUTH TWICE A DAY WITH FOOD, Disp: 180 tablet, Rfl: 3  •  nitroglycerin (NITROSTAT) 0.4 MG SL tablet, 1 under the tongue as needed for angina, may repeat q5mins for up three doses, Disp: 100 tablet, Rfl: 11  •  potassium chloride (K-DUR,KLOR-CON) 20 MEQ CR tablet, take 1 tablet by mouth once daily, Disp: 30 tablet, Rfl: 5  •  prasugrel (EFFIENT) 10 MG tablet, Take 1 tablet by mouth Daily., Disp: 30 tablet, Rfl: 5  •  sacubitril-valsartan (ENTRESTO) 24-26 MG tablet, Take 1 tablet by mouth 2 (Two) Times a Day., Disp: 180 tablet, Rfl: 3  •  spironolactone (ALDACTONE) 25 MG tablet, Take 1 tablet by mouth Daily., Disp: 90 tablet, Rfl: 1  •  VENTOLIN  (90 Base) MCG/ACT inhaler, Inhale 2 puffs Every 6 (Six) Hours As Needed for Wheezing., Disp: 1 inhaler, Rfl: 6    HISTORY OF PRESENT ILLNESS:  Patient saw Dr. Lopes in October 2020 and was restarted on Eliquis 5 mg twice daily and amiodarone was discontinued.  He is scheduled to see Dr. Lopes in office with a [employer] device check 1/26/2021.  His last remote device check November 2020 demonstrated battery 68%, 1% atrial fib burden, 99% BiV pacing, 85% atrial pacing, with one episode of nonsustained V. tach.  Patient had a [facility] hospitalization 10/9/2020-10/13/2020 for aspiration pneumonia requiring intubation.  When he presented to [facility] his oxygen saturation was 72% on room air and he was placed on 15% nonrebreather but only satting 88% so the patient was intubated.  He was started on fentanyl and Versed but became hypotensive and a central line was placed and he was given norepinephrine.  He was admitted to the ICU and was found to have bilateral upper alveolar pneumonia and right lobe pneumonia as well as pulmonary  "edema.  He was started on Rocephin and Levaquin, COVID-19 negative.  He was diuresed and was weaned off of Levophed 10/10/2020 as well as from the ventilator.  Patient then spiked a fever and MAP was 60.  Patient had mild hematuria with small blood clots.  He was discharged home on amiodarone, Eliquis, Aldactone, and Entresto.  Then the patient had a overnight hospitalization at [facility] for non-STEMI with shortness of breath.  The patient was at home lying in bed and developed shortness of breath symptoms and had taken a nitro which relieved his symptoms.  He denied any fever, chills, lower extremity edema, but feels that he \"freaked myself out\" with anxiety when he became short of breath and then his heart started racing briefly.  The patient was worried that he may be redeveloping pneumonia.  He did not initially go to the hospital but decided to go to the ED the next day because he had some continued shortness of breath.  It was found at that time that the patient had only been taking his Eliquis daily instead of twice daily.  His troponin on admission was 1.44 and second troponin was 1.63.  He was admitted for observation and troponin the next morning was 1.39 and then 1.07.  Cardiology had discussed doing a heart catheterization but he refused and wanted to only have a heart cath at [facility].  His BNP on admission was 6530 and he was diuresed with improvement of symptoms.  Lasix was sent to his pharmacy on discharge and the patient had reported that [place]  He has not had any recurrent chest pain but occasionally will have some back pain that is similar to when he had pneumonia.  He has thick sputum but is taking Mucinex.  He denies any chest pressure, recurrent nitroglycerin sublingual use, or lower extremity edema.      Review of Systems   Respiratory: Positive for shortness of breath and sputum production.       All other systems " "reviewed and otherwise negative.    Procedures       Objective:       Vitals:    01/05/21 1334 01/05/21 1338   BP: 111/65 105/56   BP Location: Left arm Left arm   Patient Position: Sitting Standing   Pulse: 74 76   Weight: 78.7 kg (173 lb 6.4 oz)    Height: 175.3 cm (69\")      Body mass index is 25.61 kg/m².  Last weight September 2020 was 176 pounds  Constitutional:       Appearance: Healthy appearance. Not in distress.   Neck:      Vascular: Carotid bruit (left) present. No JVR. JVD normal.   Pulmonary:      Effort: Pulmonary effort is normal.      Breath sounds: Examination of the right-lower field reveals rales. Examination of the left-lower field reveals rales. Decreased breath sounds present. No wheezing. No rhonchi. Rales present.   Chest:      Chest wall: Not tender to palpatation.   Cardiovascular:      Murmurs: There is a grade 2/6 mid frequency harsh early systolic murmur at the LLSB.   Pulses:     Dorsalis pedis: 1+ bilaterally.     Posterior tibial: 1+ bilaterally.  Abdominal:      General: Bowel sounds are normal.      Palpations: Abdomen is soft.      Tenderness: There is no abdominal tenderness.   Musculoskeletal: Normal range of motion.         General: No tenderness.   Skin:     General: Skin is warm and dry.      Comments: Left precordial PCD site nominal   Neurological:      General: No focal deficit present.      Mental Status: Alert and oriented to person, place and time.           Lab Review:   Lab Results   Component Value Date    GLUCOSE 101 (H) 05/06/2020    BUN 22 05/06/2020    CREATININE 1.56 (H) 05/06/2020    EGFRIFNONA 45 (L) 05/06/2020    BCR 14.1 05/06/2020    CO2 25.0 05/06/2020    CALCIUM 9.4 05/06/2020    ALBUMIN 4.30 02/12/2020    AST 13 02/12/2020    ALT 10 02/12/2020       Lab Results   Component Value Date    WBC 9.68 02/13/2020    HGB 12.9 (L) 02/13/2020    HCT 38.3 02/13/2020    MCV 93.4 02/13/2020     02/13/2020       Lab Results   Component Value Date    HGBA1C 5.70 " (H) 02/12/2020       Lab Results   Component Value Date    TSH 3.350 05/02/2018       Lab Results   Component Value Date    CHOL 99 02/12/2020    CHOL 126 07/10/2019     Lab Results   Component Value Date    TRIG 73 02/12/2020    TRIG 59 07/10/2019     Lab Results   Component Value Date    HDL 44 02/12/2020    HDL 47 07/10/2019     Lab Results   Component Value Date    LDL 40 02/12/2020    LDL 67 07/10/2019         Lab Results   Component Value Date    .0 (H) 10/10/2018     10/12/2020:  · BMP: Sodium 140, potassium 3.5, chloride 107, carbon dioxide 27, glucose 112, BUN 20, creatinine 1, GFR 80, calcium 8.6  · CBC: WBC 11.2, RBC 3.74, hemoglobin 11.4, hematocrit 34.9, MCV 93.3, MCH 30.5, RDW 13.2, platelets 130, MPV 10.5  · CT chest 10/9/2020: Normal CT angiography of the aorta, negative for PE, cardiomegaly with pulmonary vascular congestion, alveolar consolidation present in both lungs, most marked in the posterior upper lobes and posterior right lower lobe  · Chest x-ray 10/9/2020: Cardiomegaly with pulmonary vascular congestion, bilateral lower lobe consolidation/pneumonia.  12/22/2020:  · CBC: WBC 8.68, RBC 4.88, hemoglobin 14.7, hematocrit 44.7, MCV 91.6, MCH 30.1, MCHC 32.9, RDW 13.3, platelets 176, MPV 9.8, neutrophils 62.1, lymphocytes 26.3, monocytes 6.1, eos 3.7, basos 1.6  · CMP: Sodium 138, potassium 3.9, chloride 102, carbon oxide 30, [place] 106, BUN 17, creatinine 1.4, GFR 54, protein 7.2, albumin 4.3, calcium 9.8, bilirubin 0.9, AST 22, ALT 21, phosphorus 3.7  12/21/2020 troponin I.44, 1.63  12/22/2020 troponin I.39, 1.07  · ECG 12/21/2020: AV dual paced rhythm with prolonged AV conduction with occasional PVCs, biventricular pacemaker detected, 76 bpm,  ms,  ms,  ms      Assessment:     Patient with [facility] 5-day hospitalization October 2020 for bilateral pneumonia requiring intubation and overnight observation at [facility]  Center December 2020 for non-STEMI with shortness of breath symptoms.  We will initiate Farxiga 10 mg daily and obtain a repeat BMP and magnesium check next week.     Diagnosis Plan   1. Ischemic cardiomyopathy  BMP, magnesium, Farxiga 10 mg daily   2. Coronary artery disease involving native coronary artery of native heart without angina pectoris  BMP, magnesium, Farxiga 10 mg daily, No recurrent angina pectoris or CHF on current activity schedule; continue current treatment   3. Essential hypertension  Controlled, continue current cardiac medications   4. Dyslipidemia  Acceptable lipid panel February 2020, continue atorvastatin   5. Paroxysmal atrial fibrillation (CMS/HCC)  1% AF burden on remote device check November 2020, continue coreg and eliquis          Plan:         1. Patient to continue current medications and close follow up with the above providers.  2. Tentative cardiology follow up for previously scheduled appointment 2/24/2021 or patient may return sooner PRN.  3. BMP and magnesium next week  4. Farxiga 10 mg daily  5. We will provide the patient with Farxiga samples in office today; if his BMP and magnesium levels are acceptable next week, he will need a prescription called in.      Electronically signed by DAKOTA Stern, 01/05/21, 2:13 PM EST.    I, Andrea Guevara MD, [facility], personally performed the services described in this documentation as scribed by the above named individual in my presence, and it is both accurate and complete. At 14:33 EST on 01/05/2021

## 2021-01-05 ENCOUNTER — OFFICE VISIT (OUTPATIENT)
Dept: CARDIOLOGY | Facility: CLINIC | Age: 66
End: 2021-01-05

## 2021-01-05 VITALS
DIASTOLIC BLOOD PRESSURE: 56 MMHG | BODY MASS INDEX: 25.68 KG/M2 | SYSTOLIC BLOOD PRESSURE: 105 MMHG | HEIGHT: 69 IN | WEIGHT: 173.4 LBS | HEART RATE: 76 BPM

## 2021-01-05 DIAGNOSIS — I48.0 PAROXYSMAL ATRIAL FIBRILLATION (HCC): ICD-10-CM

## 2021-01-05 DIAGNOSIS — I25.10 CORONARY ARTERY DISEASE INVOLVING NATIVE CORONARY ARTERY OF NATIVE HEART WITHOUT ANGINA PECTORIS: ICD-10-CM

## 2021-01-05 DIAGNOSIS — I25.5 ISCHEMIC CARDIOMYOPATHY: Primary | ICD-10-CM

## 2021-01-05 DIAGNOSIS — I10 ESSENTIAL HYPERTENSION: ICD-10-CM

## 2021-01-05 DIAGNOSIS — E78.5 DYSLIPIDEMIA: ICD-10-CM

## 2021-01-05 PROCEDURE — 99214 OFFICE O/P EST MOD 30 MIN: CPT | Performed by: INTERNAL MEDICINE

## 2021-01-22 RX ORDER — ATORVASTATIN CALCIUM 80 MG/1
80 TABLET, FILM COATED ORAL
Qty: 90 TABLET | Refills: 3 | Status: SHIPPED | OUTPATIENT
Start: 2021-01-22 | End: 2021-08-26

## 2021-01-26 ENCOUNTER — OFFICE VISIT (OUTPATIENT)
Dept: CARDIOLOGY | Facility: CLINIC | Age: 66
End: 2021-01-26

## 2021-01-26 VITALS
BODY MASS INDEX: 25.62 KG/M2 | WEIGHT: 179 LBS | HEIGHT: 70 IN | OXYGEN SATURATION: 95 % | DIASTOLIC BLOOD PRESSURE: 64 MMHG | HEART RATE: 80 BPM | SYSTOLIC BLOOD PRESSURE: 118 MMHG

## 2021-01-26 DIAGNOSIS — I10 ESSENTIAL HYPERTENSION: ICD-10-CM

## 2021-01-26 DIAGNOSIS — I48.0 PAF (PAROXYSMAL ATRIAL FIBRILLATION) (HCC): ICD-10-CM

## 2021-01-26 DIAGNOSIS — Z95.810 CARDIAC RESYNCHRONIZATION THERAPY DEFIBRILLATOR (CRT-D) IN PLACE: Primary | ICD-10-CM

## 2021-01-26 DIAGNOSIS — I44.7 LEFT BUNDLE BRANCH BLOCK: ICD-10-CM

## 2021-01-26 DIAGNOSIS — I25.5 ISCHEMIC CARDIOMYOPATHY: ICD-10-CM

## 2021-01-26 PROCEDURE — 99214 OFFICE O/P EST MOD 30 MIN: CPT | Performed by: PHYSICIAN ASSISTANT

## 2021-01-26 PROCEDURE — 93283 PRGRMG EVAL IMPLANTABLE DFB: CPT | Performed by: PHYSICIAN ASSISTANT

## 2021-01-26 RX ORDER — DAPAGLIFLOZIN 10 MG/1
10 TABLET, FILM COATED ORAL DAILY
Qty: 30 TABLET | Refills: 0 | Status: SHIPPED | OUTPATIENT
Start: 2021-01-26 | End: 2021-02-24 | Stop reason: SDUPTHER

## 2021-01-26 NOTE — PROGRESS NOTES
Reza Mcclelland  1955  PCP: Pearl Sabillon PA    SUBJECTIVE:   Reza Mcclelland is a 65 y.o. male seen for a follow up visit regarding the following:     Chief Complaint: Follow up for afib, icm, icd check     HPI:    Since last visit the patient's status has been stable since his last visit. He notes he has recovered well since his hospitalization in October for PNA. He denies feeling any afib episodes. No chest pain, sob, edema, palps. He has felt better since started on Farxiga     History:  This is a 65-year-old patient followed by Dr. Andrea Guevara.  He has a history of extensive coronary artery disease and chronic systolic heart failure.  He has a previously implanted biventricular ICD.  He continues to have class II to class III angina symptoms. He has quit smoking        Cardiac PMH: (Old records have been reviewed and summarized below)  1. Ischemic heart disease/ischemic cardiomyopathy:  a. Remote non-ST-elevation myocardial infarction with emergent left heart catheterization and successful percutaneous transluminal coronary angioplasty and stenting of the ramus intermedius with a 3.0 x 12 mm Xience drug-eluting stent, 11/19/2008.  b. Left heart catheterization revealing severe stenosis in the LAD of about 80% to 90% in the mid portion with 2.5 x 28 mm, 3.0 x 28 mm, and 3.5 x 18 mm Xience drug-eluting stents in the mid and proximal portion of the LAD, critical left anterior descending stenosis with recommendations to return in 2 weeks for further intervention, 12/04/2008.  c. Left heart catheterization revealing critical right coronary artery stenosis with 3.5 x 28 mm Xience drug-eluting stent deployed in the mid RCA and Xience drug-eluting stent deployed in the proximal RCA, also revealing severe peripheral vascular disease with critical bilateral stenosis of common iliac artery with recommendations for the patient to remain on Plavix and aspirin and further evaluation of iliac stenosis,  12/18/2008.  d. Recurrent CCS class II-III angina with subsequent left heart catheterization and stenting with a 3.5 x 15 mm Xience drug-eluting stent to the proximal LAD and 2.5 x 18 mm Xience drug-eluting stent into the distal LAD with widely patent stents in the RCA, 90% lesion in the stent of the LAD and widely patent stent of the diagonal branch. LVEF (0.65) with no wall motion abnormalities. Severe peripheral arterial disease with 60% stenosis of the left common iliac artery and 80% to 90% stenosis in the distal common femoral artery with recommendations for abdominal aortogram with right and left lower extremity arteriogram, 01/21/2011.  e. Cardiac catheterization with percutaneous transluminal angioplasty and self-expanding stent placement to the proximal innominate artery, distal embolic protection filter placement in the right carotid artery for cerebral protection during procedure, 10 x 20 mm Xact self-expanding stent deployed to the proximal innominate stenosis, total occlusion of the proximal left internal carotid artery, 11/29/2011.  f. Markedly reduced echocardiographic LVEF (0.25) with left ventricular chamber enlargement and thickened mitral valve leaflets with moderate MR in the absence of pericardial effusion or intracavitary thrombus/mass with markedly abnormal EKG demonstrating LBBB with marked QRS widening, spring 2013.  g. Recurrent progressive chest pain syndrome with severe single-vessel obstructive coronary disease and 100% total occlusion proximal right coronary artery at site of previously placed stent with mild nonobstructive plaque in the left coronary artery system and previously placed stents in the LAD, ramus intermedius artery patent with left ventriculography deferred and moderate bilateral common iliac disease, April 2013, with subsequent bi-ventricular AICD implant ([employer], model #[id number]) by Dr. Kong.  h. Acute non-ST elevation myocardial infarction/left heart  catheterization, Dr. Lemus, with Xience drug-eluting stent to the ramus intermedius: Nonobstructive 50% to 60% mid to distal left anterior descending stenosis. Chronic total occlusion of the dominant right coronary artery served by left-sided collaterals with left ventricular systolic dysfunction. EF 10% to 15%, 08/20/2014.  i. Biventricular ICD interrogation 7/7/17; RA pacing 72%, RV pacing 96% longevity 1.8 years, mode switch less than 1%, longest was 7 hours 5/9/17, 1 noise reversion both channels  j. Residual CCS class I angina pectoris/NYHA class II-III exertional dyspnea and fatigue.   k. Recent apparent acute congestive heart failure with non-STEMI and possible bronchopneumonia with respiratory failure requiring intubation and apparent diagnostic coronary angiography with 3 stents placed with subsequent apparent AMA discharge - data deficit, [facility], [place], [place], November 2017.  l. Residual CCS class I angina pectoris/NYHA class III-IV biventricular CHF, December 2017.  m. Residual class I symptoms, June 2018  2. Peripheral vascular disease:  a. Cardiac catheterization revealing total occlusion of left     internal carotid artery previously in 2011.  b. CT angiogram of the neck showing total occlusion of the left internal carotid artery, 50% to 60% stenosis of the right carotid bulb, stent in the brachiocephalic artery placed by Dr. Santamaria in November 2011, totally occluded, March 2013.  c. Occluded left carotid artery with occluded innominate artery and intracranial circulation via thyroid artery collaterals and left vertebral with left axillary to right axillary bypass graft using 8 mm. PTFE ringed Propaten graft with arteriogram to the right subclavian artery and right axillary artery and right carotid artery, April 2013.  d. Remote hospitalization, [facility] [place], with congestive heart failure and stroke - data deficit (December 2017), with apparent  acute injury noted.  e. Cerebrovascular accident and initiation of warfarin             therapy, March 2013.  3. Atrial fibrillation on presentation to outside hospital converted to sinus with Cardizem drip and subsequent recurrence of atrial fibrillation while in the ICU, treated with amiodarone and returned to sinus, August 2014.  4. Dyslipidemia.  5. Stage 3 chronic kidney disease.   6. Left bundle branch block, probably combined ischemic/nonischemic cardiomyopathy.  7. Remote acute-on-chronic respiratory failure with hospital admission for pneumonia on ventilator.  8. History of hypertension.  9. Tobacco abuse, previously resolved but recurrent, with cessation using Chantix, winter of 2015.  10. No prior additional surgical intervention.   11. Progressive erectile dysfunction.  12. Serial acceptable AICD interrogations: February 2014; August 2014; March 2015, July 2017, with home Merlin monitoring.  13. Hyperthyroidism with abnormal thyroid ultrasound documenting thyroid nodule-data deficit.  2017      Current Outpatient Medications:   •  albuterol (PROVENTIL) (5 MG/ML) 0.5% nebulizer solution, Take 0.5 mL by nebulization Every 6 (Six) Hours As Needed for Wheezing., Disp: 1 mL, Rfl: 12  •  apixaban (ELIQUIS) 5 MG tablet tablet, Take 1 tablet by mouth Every 12 (Twelve) Hours., Disp: 60 tablet, Rfl: 5  •  atorvastatin (LIPITOR) 80 MG tablet, Take 1 tablet by mouth every night at bedtime., Disp: 90 tablet, Rfl: 3  •  carvedilol (COREG) 6.25 MG tablet, TAKE 1 TABLET BY MOUTH TWICE A DAY WITH FOOD, Disp: 180 tablet, Rfl: 3  •  nitroglycerin (NITROSTAT) 0.4 MG SL tablet, 1 under the tongue as needed for angina, may repeat q5mins for up three doses, Disp: 100 tablet, Rfl: 11  •  potassium chloride (K-DUR,KLOR-CON) 20 MEQ CR tablet, take 1 tablet by mouth once daily, Disp: 30 tablet, Rfl: 5  •  prasugrel (EFFIENT) 10 MG tablet, Take 1 tablet by mouth Daily., Disp: 30 tablet, Rfl: 5  •  sacubitril-valsartan (ENTRESTO)  24-26 MG tablet, Take 1 tablet by mouth 2 (Two) Times a Day., Disp: 180 tablet, Rfl: 3  •  spironolactone (ALDACTONE) 25 MG tablet, Take 1 tablet by mouth Daily., Disp: 90 tablet, Rfl: 1  •  VENTOLIN  (90 Base) MCG/ACT inhaler, Inhale 2 puffs Every 6 (Six) Hours As Needed for Wheezing., Disp: 1 inhaler, Rfl: 6  •  Dapagliflozin Propanediol (Farxiga) 10 MG tablet, Take 10 mg by mouth Daily., Disp: 30 tablet, Rfl: 0    Past Medical History, Past Surgical History, Family history, Social History, and Medications were all reviewed with the patient today and updated as necessary.       Patient Active Problem List   Diagnosis   • Coronary artery disease involving native coronary artery of native heart without angina pectoris   • Ischemic cardiomyopathy   • Peripheral vascular disease (CMS/HCC)   • Cerebrovascular accident (CMS/HCC)   • PAF (paroxysmal atrial fibrillation) (CMS/[facility])   • Hyperlipidemia LDL goal <70   • Chronic kidney disease (CKD), stage III (moderate) (CMS/HCC)   • Left bundle branch block   • Essential hypertension   • COPD   • Cardiac resynchronization therapy defibrillator (CRT-D) in place   • Carotid occlusion, left   • Leukocytosis   • Silent aspiration   • Dyslipidemia     No Known Allergies  Past Medical History:   Diagnosis Date   • Acute on chronic respiratory failure (CMS/HCC)     Recent acute-on-chronic respiratory failure with hospital admission for pneumonia on ventilator.   • AICD (automatic cardioverter/defibrillator) present    • Atrial fibrillation (CMS/HCC)     Atrial fibrillation on presentation to outside hospital converted to sinus with Cardizem drip and subsequent recurrence of atrial fibrillation while in the ICU, treated with amiodarone and returned to sinus, August 2014.   • CAD (coronary artery disease)    • Cerebrovascular accident (CMS/HCC)     Cerebrovascular accident and initiation of warfarin therapy, March 2013.   • Chronic kidney disease (CKD), stage III (moderate)  (CMS/[facility])    • COPD (chronic obstructive pulmonary disease) (CMS/[facility])    • Dyslipidemia    • History of hypertension    • Ischemic cardiomyopathy    • Ischemic heart disease    • Left bundle branch block     Left bundle branch block, probably combined ischemic/nonischemic cardiomyopathy.   • Peripheral vascular disease (CMS/[facility])      Past Surgical History:   Procedure Laterality Date   • AXILLARY AXILLARY BYPASS     • CARDIAC CATHETERIZATION      stents   • CARDIAC CATHETERIZATION N/A 2/12/2020    Procedure: Left Heart Cath;  Surgeon: Guillaume Shore MD;  Location:  [facility] CATH [facility]VASIVE LOC[facility];  Service: Cardiology;  Laterality: N/A;   • CARDIAC DEFIBRILLATOR PLACEMENT     • CARDIAC ELECTROPHYSIOLOGY PROCEDURE N/A 3/13/2019    Procedure: BIV ICD generator change- SJM in 4-6 weeks.;  Surgeon: Reza Lopes MD;  Location:  [facility] EP INVASIVE LOCATION;  Service: Cardiology   • CAROTID STENT     • INSERT / REPLACE / REMOVE PACEMAKER       Family History   Problem Relation Age of Onset   • Dementia Mother    • Prostate cancer Father    • Heart disease Father    • Cancer Brother         kidney   • No Known Problems Sister    • Heart failure Maternal Grandmother    • Heart disease Maternal Grandmother    • Heart attack Maternal Grandmother    • Heart disease Maternal Grandfather    • Heart attack Maternal Grandfather    • No Known Problems Paternal Grandmother    • Heart attack Paternal Grandfather    • No Known Problems Sister    • Heart attack Paternal Uncle    • Heart disease Paternal Uncle      Social History     Tobacco Use   • Smoking status: Former Smoker     Packs/day: 1.00     Years: 48.00     Pack years: 48.00     Types: Cigarettes     Quit date: 11/27/2017     Years since quitting: 3.1   • Smokeless tobacco: Never Used   • Tobacco comment: Pt quit smoking 6-7 months ago   Substance Use Topics   • Alcohol use: No         PHYSICAL EXAM:    /64 (BP Location: Left arm, Patient Position: Sitting)    "Pulse 80   Ht 177.8 cm (70\")   Wt 81.2 kg (179 lb)   SpO2 95%   BMI 25.68 kg/m²        Wt Readings from Last 5 Encounters:   01/26/21 81.2 kg (179 lb)   01/05/21 78.7 kg (173 lb 6.4 oz)   10/20/20 81.7 kg (180 lb 3.2 oz)   09/11/20 79.9 kg (176 lb 3.2 oz)   07/16/20 81.4 kg (179 lb 6 oz)       BP Readings from Last 5 Encounters:   01/26/21 118/64   01/05/21 105/56   10/20/20 130/70   09/11/20 (!) 86/64   07/16/20 102/56       General-Well Nourished, Well developed  Eyes - PERRLA  Neck- supple, No mass  CV- regular rate and rhythm, no MRG, No edema  Lung- clear bilaterally  Abd- soft, +BS  Musc/skel - Norm strength and range of motion  Skin- warm and dry  Neuro - Alert & Oriented x 3, appropriate mood.        Medical problems and test results were reviewed with the patient today.     Recent Results (from the past 672 hour(s))   Comprehensive Metabolic Panel    Collection Time: 01/18/21 11:29 AM    Specimen: Blood   Result Value Ref Range    Glucose 104 (H) 65 - 99 mg/dL    BUN 22 8 - 23 mg/dL    Creatinine 1.31 (H) 0.76 - 1.27 mg/dL    Sodium 134 (L) 136 - 145 mmol/L    Potassium 4.2 3.5 - 5.2 mmol/L    Chloride 100 98 - 107 mmol/L    CO2 24.0 22.0 - 29.0 mmol/L    Calcium 9.5 8.6 - 10.5 mg/dL    Total Protein 7.0 6.0 - 8.5 g/dL    Albumin 4.40 3.50 - 5.20 g/dL    ALT (SGPT) 9 1 - 41 U/L    AST (SGOT) 12 1 - 40 U/L    Alkaline Phosphatase 81 39 - 117 U/L    Total Bilirubin 0.8 0.0 - 1.2 mg/dL    eGFR Non African Amer 55 (L) >60 mL/min/1.73    Globulin 2.6 gm/dL    A/G Ratio 1.7 g/dL    BUN/Creatinine Ratio 16.8 7.0 - 25.0    Anion Gap 10.0 5.0 - 15.0 mmol/L   TSH    Collection Time: 01/18/21 11:29 AM    Specimen: Blood   Result Value Ref Range    TSH 0.959 0.270 - 4.200 uIU/mL   Magnesium    Collection Time: 01/18/21 11:29 AM    Specimen: Blood   Result Value Ref Range    Magnesium 2.2 1.6 - 2.4 mg/dL         EKG: (EKG has been independently visualized by me and summarized below)    Procedures     ASSESSMENT and " PLAN    1.  Chronic systolic heart failure-stable FC II-III HF symptoms. Continue optimal rx with Coreg, Entresto, Aldactone.   2.  Biventricular ICD-we'll monitor in device clinic. Post Gen Change in March 2019. Stable function. <1% mode switched.    3.  Atrial Fibrillation - current burden is <1% and amiodarone has been discontinued. Stable rates and asymptomatic. Continue Eliquis 5mg BID.   4.  Use of Amio - discontinued at last visit.   5.  CAD- recent NACHO to mid left circumflex 2/2020. Noted to have significant CAD unamendable to revascularization. Continue Effient, statin. Dr. Guevara/Patrick are addressing. No anginal symptoms today.       Return in about 6 months (around 7/26/2021) for SJM.        Celia Vela PA-C   Cardiology/Electrophysiology  1/26/2021  10:16 EST

## 2021-01-29 LAB
ALBUMIN SERPL-MCNC: 4.4 G/DL (ref 3.5–5.2)
ALBUMIN/GLOB SERPL: 1.7 G/DL
ALP SERPL-CCNC: 81 U/L (ref 39–117)
ALT SERPL-CCNC: 8 U/L (ref 1–41)
AST SERPL-CCNC: 12 U/L (ref 1–40)
BILIRUB SERPL-MCNC: 0.8 MG/DL (ref 0.2–1.2)
BUN SERPL-MCNC: 22 MG/DL (ref 8–23)
BUN/CREAT SERPL: 16.8 (ref 7–25)
CALCIUM SERPL-MCNC: 9.5 MG/DL (ref 8.6–10.5)
CHLORIDE SERPL-SCNC: 100 MMOL/L (ref 98–107)
CO2 SERPL-SCNC: 24 MMOL/L (ref 22–29)
CREAT SERPL-MCNC: 1.31 MG/DL (ref 0.76–1.27)
GLOBULIN SER CALC-MCNC: 2.6 GM/DL
GLUCOSE SERPL-MCNC: 104 MG/DL (ref 65–99)
MAGNESIUM SERPL-MCNC: 2.2 MG/DL (ref 1.6–2.4)
POTASSIUM SERPL-SCNC: 4.2 MMOL/L (ref 3.5–5.2)
PROT SERPL-MCNC: 7 G/DL (ref 6–8.5)
SODIUM SERPL-SCNC: 134 MMOL/L (ref 136–145)
TSH SERPL DL<=0.005 MIU/L-ACNC: 0.96 MIU/ML (ref 0.27–4.2)

## 2021-02-08 RX ORDER — DAPAGLIFLOZIN 10 MG/1
10 TABLET, FILM COATED ORAL DAILY
Qty: 30 TABLET | Refills: 0 | OUTPATIENT
Start: 2021-02-08

## 2021-02-24 ENCOUNTER — OFFICE VISIT (OUTPATIENT)
Dept: CARDIOLOGY | Facility: CLINIC | Age: 66
End: 2021-02-24

## 2021-02-24 VITALS
SYSTOLIC BLOOD PRESSURE: 105 MMHG | HEART RATE: 77 BPM | DIASTOLIC BLOOD PRESSURE: 54 MMHG | WEIGHT: 177.4 LBS | BODY MASS INDEX: 25.4 KG/M2 | HEIGHT: 70 IN

## 2021-02-24 DIAGNOSIS — I10 ESSENTIAL HYPERTENSION: ICD-10-CM

## 2021-02-24 DIAGNOSIS — I48.0 PAROXYSMAL ATRIAL FIBRILLATION (HCC): ICD-10-CM

## 2021-02-24 DIAGNOSIS — E78.5 DYSLIPIDEMIA: ICD-10-CM

## 2021-02-24 DIAGNOSIS — I25.10 CORONARY ARTERY DISEASE INVOLVING NATIVE CORONARY ARTERY OF NATIVE HEART WITHOUT ANGINA PECTORIS: ICD-10-CM

## 2021-02-24 DIAGNOSIS — I25.5 ISCHEMIC CARDIOMYOPATHY: Primary | ICD-10-CM

## 2021-02-24 PROCEDURE — 99214 OFFICE O/P EST MOD 30 MIN: CPT | Performed by: INTERNAL MEDICINE

## 2021-02-24 RX ORDER — DAPAGLIFLOZIN 10 MG/1
10 TABLET, FILM COATED ORAL DAILY
Qty: 30 TABLET | Refills: 11 | Status: SHIPPED | OUTPATIENT
Start: 2021-02-24 | End: 2021-05-25

## 2021-02-24 NOTE — PROGRESS NOTES
Subjective:     Encounter Date:02/24/2021    Patient ID: Reza Mcclelland is a 65 y.o. single white male, retired [employer] , from [place], [place].     PHYSICIAN: Rolanda Billingsley MD/EMILY Romo  NEUROLOGIST: Jonas Quiroz MD   CARDIOVASCULAR SURGEON: Guillaume Pruitt MD, [facility]  INTERVENTIONAL CARDIOLOGIST: Ismael Nguyen MD, [facility]/ Oliverio Lemus MD, [facility], [facility]/Guillaume Shore MD, [facility]  ELECTROPHYSIOLOGIST: Reza Lopes MD, [facility], [facility]: DAKOTA Peters  ENDOCRINOLOGIST: unknown .    Chief Complaint:   Chief Complaint   Patient presents with   • Cardiomyopathy     f/u       Problem List:  1. Ischemic heart disease/ischemic cardiomyopathy:  a. Remote non-ST-elevation myocardial infarction with emergent left heart catheterization and successful percutaneous transluminal coronary angioplasty and stenting of the ramus intermedius with a 3.0 x 12 mm Xience drug-eluting stent, 11/19/2008.  b. Left heart catheterization revealing severe stenosis in the LAD of about 80% to 90% in the mid portion with 2.5 x 28 mm, 3.0 x 28 mm, and 3.5 x 18 mm Xience drug-eluting stents in the mid and proximal portion of the LAD, critical left anterior descending stenosis with recommendations to return in 2 weeks for further intervention, 12/04/2008.  c. Left heart catheterization revealing critical right coronary artery stenosis with 3.5 x 28 mm Xience drug-eluting stent deployed in the mid RCA and Xience drug-eluting stent deployed in the proximal RCA, also revealing severe peripheral vascular disease with critical bilateral stenosis of common iliac artery with recommendations for the patient to remain on Plavix and aspirin and further evaluation of iliac stenosis, 12/18/2008.  d. Recurrent CCS class II-III angina with subsequent left heart catheterization and stenting with a 3.5 x 15 mm Xience drug-eluting stent to the proximal LAD and 2.5  x 18 mm Xience drug-eluting stent into the distal LAD with widely patent stents in the RCA, 90% lesion in the stent of the LAD and widely patent stent of the diagonal branch. LVEF (0.65) with no wall motion abnormalities. Severe peripheral arterial disease with 60% stenosis of the left common iliac artery and 80% to 90% stenosis in the distal common femoral artery with recommendations for abdominal aortogram with right and left lower extremity arteriogram, 01/21/2011.  e. Cardiac catheterization with percutaneous transluminal angioplasty and self-expanding stent placement to the proximal innominate artery, distal embolic protection filter placement in the right carotid artery for cerebral protection during procedure, 10 x 20 mm Xact self-expanding stent deployed to the proximal innominate stenosis, total occlusion of the proximal left internal carotid artery, 11/29/2011.  f. Markedly reduced echocardiographic LVEF (0.25) with left ventricular chamber enlargement and thickened mitral valve leaflets with moderate MR in the absence of pericardial effusion or intracavitary thrombus/mass with markedly abnormal EKG demonstrating LBBB with marked QRS widening, spring 2013.  g. Recurrent progressive chest pain syndrome with severe single-vessel obstructive coronary disease and 100% total occlusion proximal right coronary artery at site of previously placed stent with mild nonobstructive plaque in the left coronary artery system and previously placed stents in the LAD, ramus intermedius artery patent with left ventriculography deferred and moderate bilateral common iliac disease, April 2013, with subsequent bi-ventricular AICD implant ([employer], model #[id number]) by Dr. Kong.  h. Acute non-ST elevation myocardial infarction/left heart catheterization, Dr. Lemus, with Xience drug-eluting stent to the ramus intermedius: Nonobstructive 50% to 60% mid to distal left anterior descending stenosis. Chronic total  occlusion of the dominant right coronary artery served by left-sided collaterals with left ventricular systolic dysfunction. EF 10% to 15%, 08/20/2014.  i. Biventricular ICD interrogation 7/7/17; RA pacing 72%, RV pacing 96% longevity 1.8 years, mode switch less than 1%, longest was 7 hours 5/9/17, 1 noise reversion both channels  j. Residual CCS class I angina pectoris/NYHA class II-III exertional dyspnea and fatigue.   k. Remote apparent acute congestive heart failure with non-STEMI and possible bronchopneumonia with respiratory failure requiring intubation and apparent diagnostic coronary angiography with 3 stents placed with subsequent apparent AMA discharge - data deficit, [facility], [place], [place], November 2017.  l. Residual CCS class I angina pectoris/NYHA class III-IV biventricular CHF, December 2017.  m. Residual class I symptoms, June 2018, October 2018, July 2019 with progressive angina pectoris and abnormal IV Lexiscan Cardiolite study (lateral ischemia with severe reduction in LVEF 0.10) with [facility] demonstrating severe 3-vessel disease with intervention to mid-LCx, February 2020, with residual class I symptoms, March 2020, September 2020, January 2021.   n. Remote [employer] device check November 2020 demonstrated battery 68%, 1% atrial fib burden, 99% BiV pacing, 85% atrial pacing, with one episode of nonsustained V. Tach, January 2021 demonstrated battery 65%, 1% atrial fib burden, 97% BiV, 82% atrial pacing.   o. Recent residual class 1 symptoms on limited activity with acceptable device interrogation, January 2021.  2. Peripheral vascular disease:  a. Cardiac catheterization revealing total occlusion of left internal carotid artery previously in 2011.  b. CT angiogram of the neck showing total occlusion of the left internal carotid artery, 50% to 60% stenosis of the right carotid bulb, stent in the brachiocephalic artery placed by Dr. Santamaria in November 2011, totally  occluded, March 2013.  c. Occluded left carotid artery with occluded innominate artery and intracranial circulation via thyroid artery collaterals and left vertebral with left axillary to right axillary bypass graft using 8 mm. PTFE ringed Propaten graft with arteriogram to the right subclavian artery and right axillary artery and right carotid artery, April 2013.  d. Remote hospitalization, [facility], with congestive heart failure and stroke - data deficit (December 2017), with apparent acute injury noted.  3. Cerebrovascular accident and initiation of warfarin therapy, March 2013.  4. Atrial fibrillation on presentation to outside hospital converted to sinus with Cardizem drip and subsequent recurrence of atrial fibrillation while in the ICU, treated with amiodarone and returned to sinus, August 2014.  5. Dyslipidemia.  6. Stage 3 chronic kidney disease.   7. Left bundle branch block, probably combined ischemic/nonischemic cardiomyopathy.  8. Remote acute-on-chronic respiratory failure with hospital admission for pneumonia on ventilator.  9. History of hypertension.  10. Tobacco abuse, previously resolved but recurrent, with cessation using Chantix, winter of 2015, with recent discontinuation, autumn 2019  11. No prior additional surgical intervention.   12. Progressive erectile dysfunction.  13. Serial acceptable AICD interrogations: February 2014; August 2014; March 2015, July 2017, with home [facility]lin monitoring, with ICD generator change, March 2019  14. Hyperthyroidism with abnormal thyroid ultrasound documenting thyroid nodule-data deficit.  2017  15. [facility] hospitalization 10/9/2020-10/13/2020 for aspiration pneumonia requiring intubation  16.  [facility] overnight hospitalization December 2020 for non-STEMI    No Known Allergies    Current Outpatient Medications:   •  albuterol (PROVENTIL) (5 MG/ML) 0.5% nebulizer solution,  Take 0.5 mL by nebulization Every 6 (Six) Hours As Needed for Wheezing., Disp: 1 mL, Rfl: 12  •  apixaban (ELIQUIS) 5 MG tablet tablet, Take 1 tablet by mouth Every 12 (Twelve) Hours., Disp: 60 tablet, Rfl: 5  •  atorvastatin (LIPITOR) 80 MG tablet, Take 1 tablet by mouth every night at bedtime., Disp: 90 tablet, Rfl: 3  •  carvedilol (COREG) 6.25 MG tablet, TAKE 1 TABLET BY MOUTH TWICE A DAY WITH FOOD, Disp: 180 tablet, Rfl: 3  •  Dapagliflozin Propanediol (Farxiga) 10 MG tablet, Take 10 mg by mouth Daily., Disp: 30 tablet, Rfl: 0  •  nitroglycerin (NITROSTAT) 0.4 MG SL tablet, 1 under the tongue as needed for angina, may repeat q5mins for up three doses, Disp: 100 tablet, Rfl: 11  •  potassium chloride (K-DUR,KLOR-CON) 20 MEQ CR tablet, take 1 tablet by mouth once daily, Disp: 30 tablet, Rfl: 5  •  prasugrel (EFFIENT) 10 MG tablet, Take 1 tablet by mouth Daily., Disp: 30 tablet, Rfl: 5  •  sacubitril-valsartan (ENTRESTO) 24-26 MG tablet, Take 1 tablet by mouth 2 (Two) Times a Day., Disp: 180 tablet, Rfl: 3  •  spironolactone (ALDACTONE) 25 MG tablet, Take 1 tablet by mouth Daily., Disp: 90 tablet, Rfl: 1  •  VENTOLIN  (90 Base) MCG/ACT inhaler, Inhale 2 puffs Every 6 (Six) Hours As Needed for Wheezing., Disp: 1 inhaler, Rfl: 6    History of Present Illness:  Patient returns for scheduled 6 week follow-up.  He had an appointment with Celia Vela PA-C last month with no changes.  The patient denies any chest pain, shortness of breath, dizziness, edema, presyncope, or syncope.  He was on Farxiga for about 3 weeks and he felt much better with more energy.  His prescription ran out and he did not have any refills.  He was told that he needed laboratory testing and never heard anything back whether he should continue this or not.  He had his first Covid vaccination at [facility].  He is unsure whether it was Pfizer or [employer].  Patient has had no interim ER visits, hospitalizations, serious illnesses, or  "surgeries.          Review of Systems   All other systems reviewed and are negative.     Obtained and negative except as outlined in problem list and HPI.    Procedures       Objective:       Vitals:    02/24/21 1357 02/24/21 1359   BP: 104/60 105/54   BP Location: Left arm Left arm   Patient Position: Sitting Standing   Pulse: 79 77   Weight: 80.5 kg (177 lb 6.4 oz)    Height: 177.8 cm (70\")      Body mass index is 25.45 kg/m².  Last weight: 179 lbs    Vitals signs reviewed.   Constitutional:       Appearance: Well-developed.   Neck:      Thyroid: No thyromegaly.      Vascular: No carotid bruit or JVD.      Lymphadenopathy: No cervical adenopathy.   Pulmonary:      Effort: Pulmonary effort is normal.      Breath sounds: Decreased breath sounds present. No wheezing. No rhonchi. No rales.   Cardiovascular:      Regular rhythm.      Murmurs: There is a grade 2/6 mid frequency harsh early systolic murmur at the LLSB.      No gallop. No S3 gallop.   Pulses:     Dorsalis pedis: 1+ bilaterally.     Posterior tibial: 1+ bilaterally.  Edema:     Peripheral edema absent.   Abdominal:      Palpations: Abdomen is soft. There is no abdominal mass.      Tenderness: There is no abdominal tenderness. There is no guarding.   Musculoskeletal: Normal range of motion.   Skin:     General: Skin is warm and dry.      Findings: No rash.      Comments: Left PCD site nominal   Neurological:      Mental Status: Alert and oriented to person, place, and time.           Lab Review:   Lab Results   Component Value Date    GLUCOSE 104 (H) 01/18/2021    BUN 22 01/18/2021    CREATININE 1.31 (H) 01/18/2021    EGFRIFNONA 55 (L) 01/18/2021    BCR 16.8 01/18/2021     (L) 01/18/2021    K 4.2 01/18/2021     01/18/2021    MG 2.2 01/18/2021    CO2 24.0 01/18/2021    CALCIUM 9.5 01/18/2021    PROTENTOTREF 7.0 01/18/2021    ALBUMIN 4.40 01/18/2021    LABIL2 1.7 01/18/2021    AST 12 01/18/2021    ALT 9 01/18/2021       Lab Results   Component " Value Date    WBC 9.68 02/13/2020    HGB 12.9 (L) 02/13/2020    HCT 38.3 02/13/2020    MCV 93.4 02/13/2020     02/13/2020       Lab Results   Component Value Date    HGBA1C 5.70 (H) 02/12/2020       Lab Results   Component Value Date    TSH 0.959 01/18/2021       Lab Results   Component Value Date    CHOL 99 02/12/2020    CHOL 126 07/10/2019    CHOL 141 10/10/2018     Lab Results   Component Value Date    TRIG 73 02/12/2020    TRIG 59 07/10/2019    TRIG 70 10/10/2018     Lab Results   Component Value Date    HDL 44 02/12/2020    HDL 47 07/10/2019    HDL 37 (L) 10/10/2018     Lab Results   Component Value Date    LDL 40 02/12/2020    LDL 67 07/10/2019     10/10/2018           Assessment:       Overall continued acceptable course with no new interim cardiopulmonary complaints with acceptable functional status. We will defer additional diagnostic or therapeutic intervention from a cardiac perspective at this time.  We will restart his Farxiga 10 mg daily.     Diagnosis Plan   1. Ischemic cardiomyopathy  Restart Farxiga 10mg daily   2. Coronary artery disease involving native coronary artery of native heart without angina pectoris  No recurrent angina pectoris or CHF on current activity schedule; continue current treatment   3. Essential hypertension  Controlled, continue current cardiac medications   4. Dyslipidemia  No new labs to review, continue atorvastatin   5. Paroxysmal atrial fibrillation (CMS/HCC)  Stable        Plan:       1. Patient to continue current medications and close follow up with the above providers.  2. Tentative cardiology follow up in August 2021 or patient may return sooner PRN.  3. Restart Farxiga 10mg daily    Scribed for Andrea Guevara MD by Rachael Michel, APRN. 2/24/2021  14:18 EST    I, Andrea Guevara MD, [facility], personally performed the services described in this documentation as scribed by the above named individual in my presence, and it is both accurate and complete.  At 14:33 EST on 02/24/2021

## 2021-03-30 ENCOUNTER — TELEPHONE (OUTPATIENT)
Dept: CARDIOLOGY | Facility: CLINIC | Age: 66
End: 2021-03-30

## 2021-03-30 NOTE — TELEPHONE ENCOUNTER
Per remote monitor site-[facility]-pt is in ongoing AF X 4 days, max V-rate 128 bpm.     Left message for patient to return call to device clinic, # provided.

## 2021-04-06 ENCOUNTER — TELEPHONE (OUTPATIENT)
Dept: CARDIOLOGY | Facility: CLINIC | Age: 66
End: 2021-04-06

## 2021-04-06 NOTE — TELEPHONE ENCOUNTER
>>Pt called to report episodes shortness of breath occurring when he lies flat. Pt reports 3 episodes of nocturnal dyspnea last week that awakened him from sleep. Pt states last week he had used three pillows to sleep at night but now is sleeping on one pillow.   >>Pt states he does have swelling in his feet and ankles which waxes & wanes but states his weights are steady. Pt also denies excess sodium intake.  >>Pt states the shortness of breath is better but wanted to discuss recent episodes.  >>I reviewed prescribed cardiac medications with patient & patient reports compliance with all meds.     >>Pt sent in a remote cardiac device transmission today: Normal function, ongoing AF since 3/25/2021 @ 12:34PM, Max V-rate-128 bpm. See report in [facility].

## 2021-04-07 NOTE — TELEPHONE ENCOUNTER
Notified patient that scheduling would call him to arrange an appointment w/Dr. Lopes or Celia Vela in the near future.     Notificaton sent to scheduling dept.

## 2021-04-26 ENCOUNTER — OFFICE VISIT (OUTPATIENT)
Dept: CARDIOLOGY | Facility: CLINIC | Age: 66
End: 2021-04-26

## 2021-04-26 ENCOUNTER — PRE-ADMISSION TESTING (OUTPATIENT)
Dept: PREADMISSION TESTING | Facility: HOSPITAL | Age: 66
End: 2021-04-26

## 2021-04-26 VITALS
TEMPERATURE: 96.2 F | BODY MASS INDEX: 24.17 KG/M2 | DIASTOLIC BLOOD PRESSURE: 54 MMHG | HEIGHT: 70 IN | HEART RATE: 77 BPM | WEIGHT: 168.8 LBS | SYSTOLIC BLOOD PRESSURE: 102 MMHG | OXYGEN SATURATION: 99 %

## 2021-04-26 DIAGNOSIS — Z95.810 CARDIAC RESYNCHRONIZATION THERAPY DEFIBRILLATOR (CRT-D) IN PLACE: Primary | ICD-10-CM

## 2021-04-26 DIAGNOSIS — I44.7 LEFT BUNDLE BRANCH BLOCK: ICD-10-CM

## 2021-04-26 DIAGNOSIS — I50.23 ACUTE ON CHRONIC SYSTOLIC CHF (CONGESTIVE HEART FAILURE) (HCC): ICD-10-CM

## 2021-04-26 DIAGNOSIS — I48.0 PAF (PAROXYSMAL ATRIAL FIBRILLATION) (HCC): ICD-10-CM

## 2021-04-26 DIAGNOSIS — I25.5 ISCHEMIC CARDIOMYOPATHY: ICD-10-CM

## 2021-04-26 LAB
FLUAV RNA RESP QL NAA+PROBE: NOT DETECTED
FLUBV RNA RESP QL NAA+PROBE: NOT DETECTED
SARS-COV-2 RNA RESP QL NAA+PROBE: NOT DETECTED

## 2021-04-26 PROCEDURE — 87636 SARSCOV2 & INF A&B AMP PRB: CPT

## 2021-04-26 PROCEDURE — 99214 OFFICE O/P EST MOD 30 MIN: CPT | Performed by: PHYSICIAN ASSISTANT

## 2021-04-26 PROCEDURE — C9803 HOPD COVID-19 SPEC COLLECT: HCPCS

## 2021-04-26 PROCEDURE — 93284 PRGRMG EVAL IMPLANTABLE DFB: CPT | Performed by: PHYSICIAN ASSISTANT

## 2021-04-26 RX ORDER — AMIODARONE HYDROCHLORIDE 200 MG/1
200 TABLET ORAL 2 TIMES DAILY
Qty: 30 TABLET | Refills: 5 | Status: SHIPPED | OUTPATIENT
Start: 2021-04-26 | End: 2021-12-29

## 2021-04-26 NOTE — PROGRESS NOTES
Reza Mcclelland  1955  PCP: Pearl Sabillon PA    SUBJECTIVE:   Reza Mcclelland is a 66 y.o. male seen for a follow up visit regarding the following:     Chief Complaint: Follow up for chronic systolic heart failure, BIV ICD check, PAF, increased pedal edema, ALTAMIRANO     HPI:    Since last visit the patient's status has been unstable. He presents today as a work in for increasing LE edema, ALTAMIRANO, and orthopnea x 4 weeks. His device interrogation reveals he has been in afib almost persistently since mid March. He had one episode of chest pain about 2 weeks that lasted a few seconds and resolved spontaneously. He has been taking Eliquis with no missed doses.     History:  This is a 65-year-old patient followed by Dr. Andrea Guevara.  He has a history of extensive coronary artery disease and chronic systolic heart failure.  He has a previously implanted biventricular ICD.  He continues to have class II to class III angina symptoms. He has quit smoking        Cardiac PMH: (Old records have been reviewed and summarized below)  1. Ischemic heart disease/ischemic cardiomyopathy:  a. Remote non-ST-elevation myocardial infarction with emergent left heart catheterization and successful percutaneous transluminal coronary angioplasty and stenting of the ramus intermedius with a 3.0 x 12 mm Xience drug-eluting stent, 11/19/2008.  b. Left heart catheterization revealing severe stenosis in the LAD of about 80% to 90% in the mid portion with 2.5 x 28 mm, 3.0 x 28 mm, and 3.5 x 18 mm Xience drug-eluting stents in the mid and proximal portion of the LAD, critical left anterior descending stenosis with recommendations to return in 2 weeks for further intervention, 12/04/2008.  c. Left heart catheterization revealing critical right coronary artery stenosis with 3.5 x 28 mm Xience drug-eluting stent deployed in the mid RCA and Xience drug-eluting stent deployed in the proximal RCA, also revealing severe peripheral vascular disease  with critical bilateral stenosis of common iliac artery with recommendations for the patient to remain on Plavix and aspirin and further evaluation of iliac stenosis, 12/18/2008.  d. Recurrent CCS class II-III angina with subsequent left heart catheterization and stenting with a 3.5 x 15 mm Xience drug-eluting stent to the proximal LAD and 2.5 x 18 mm Xience drug-eluting stent into the distal LAD with widely patent stents in the RCA, 90% lesion in the stent of the LAD and widely patent stent of the diagonal branch. LVEF (0.65) with no wall motion abnormalities. Severe peripheral arterial disease with 60% stenosis of the left common iliac artery and 80% to 90% stenosis in the distal common femoral artery with recommendations for abdominal aortogram with right and left lower extremity arteriogram, 01/21/2011.  e. Cardiac catheterization with percutaneous transluminal angioplasty and self-expanding stent placement to the proximal innominate artery, distal embolic protection filter placement in the right carotid artery for cerebral protection during procedure, 10 x 20 mm Xact self-expanding stent deployed to the proximal innominate stenosis, total occlusion of the proximal left internal carotid artery, 11/29/2011.  f. Markedly reduced echocardiographic LVEF (0.25) with left ventricular chamber enlargement and thickened mitral valve leaflets with moderate MR in the absence of pericardial effusion or intracavitary thrombus/mass with markedly abnormal EKG demonstrating LBBB with marked QRS widening, spring 2013.  g. Recurrent progressive chest pain syndrome with severe single-vessel obstructive coronary disease and 100% total occlusion proximal right coronary artery at site of previously placed stent with mild nonobstructive plaque in the left coronary artery system and previously placed stents in the LAD, ramus intermedius artery patent with left ventriculography deferred and moderate bilateral common iliac disease, April  2013, with subsequent bi-ventricular AICD implant ([employer], model #[id number]) by Dr. Kong.  h. Acute non-ST elevation myocardial infarction/left heart catheterization, Dr. Lemus, with Xience drug-eluting stent to the ramus intermedius: Nonobstructive 50% to 60% mid to distal left anterior descending stenosis. Chronic total occlusion of the dominant right coronary artery served by left-sided collaterals with left ventricular systolic dysfunction. EF 10% to 15%, 08/20/2014.  i. Biventricular ICD interrogation 7/7/17; RA pacing 72%, RV pacing 96% longevity 1.8 years, mode switch less than 1%, longest was 7 hours 5/9/17, 1 noise reversion both channels  j. Residual CCS class I angina pectoris/NYHA class II-III exertional dyspnea and fatigue.   k. Recent apparent acute congestive heart failure with non-STEMI and possible bronchopneumonia with respiratory failure requiring intubation and apparent diagnostic coronary angiography with 3 stents placed with subsequent apparent AMA discharge - data deficit, [facility], [place], [place], November 2017.  l. Residual CCS class I angina pectoris/NYHA class III-IV biventricular CHF, December 2017.  m. Residual class I symptoms, June 2018  2. Peripheral vascular disease:  a. Cardiac catheterization revealing total occlusion of left     internal carotid artery previously in 2011.  b. CT angiogram of the neck showing total occlusion of the left internal carotid artery, 50% to 60% stenosis of the right carotid bulb, stent in the brachiocephalic artery placed by Dr. Santamaria in November 2011, totally occluded, March 2013.  c. Occluded left carotid artery with occluded innominate artery and intracranial circulation via thyroid artery collaterals and left vertebral with left axillary to right axillary bypass graft using 8 mm. PTFE ringed Propaten graft with arteriogram to the right subclavian artery and right axillary artery and right carotid artery,  April 2013.  d. Remote hospitalization, [facility], with congestive heart failure and stroke - data deficit (December 2017), with apparent acute injury noted.  e. Cerebrovascular accident and initiation of warfarin             therapy, March 2013.  3. Atrial fibrillation on presentation to outside hospital converted to sinus with Cardizem drip and subsequent recurrence of atrial fibrillation while in the ICU, treated with amiodarone and returned to sinus, August 2014.  4. Dyslipidemia.  5. Stage 3 chronic kidney disease.   6. Left bundle branch block, probably combined ischemic/nonischemic cardiomyopathy.  7. Remote acute-on-chronic respiratory failure with hospital admission for pneumonia on ventilator.  8. History of hypertension.  9. Tobacco abuse, previously resolved but recurrent, with cessation using Chantix, winter of 2015.  10. No prior additional surgical intervention.   11. Progressive erectile dysfunction.  12. Serial acceptable AICD interrogations: February 2014; August 2014; March 2015, July 2017, with home [facility] monitoring.  13. Hyperthyroidism with abnormal thyroid ultrasound documenting thyroid nodule-data deficit.  2017      Current Outpatient Medications:   •  albuterol (PROVENTIL) (5 MG/ML) 0.5% nebulizer solution, Take 0.5 mL by nebulization Every 6 (Six) Hours As Needed for Wheezing., Disp: 1 mL, Rfl: 12  •  apixaban (ELIQUIS) 5 MG tablet tablet, Take 1 tablet by mouth Every 12 (Twelve) Hours., Disp: 60 tablet, Rfl: 5  •  atorvastatin (LIPITOR) 80 MG tablet, Take 1 tablet by mouth every night at bedtime., Disp: 90 tablet, Rfl: 3  •  carvedilol (COREG) 6.25 MG tablet, TAKE 1 TABLET BY MOUTH TWICE A DAY WITH FOOD, Disp: 180 tablet, Rfl: 3  •  Dapagliflozin Propanediol (Farxiga) 10 MG tablet, Take 10 mg by mouth Daily., Disp: 30 tablet, Rfl: 11  •  nitroglycerin (NITROSTAT) 0.4 MG SL tablet, 1 under the tongue as needed for angina, may repeat q5mins for up three doses,  Disp: 100 tablet, Rfl: 11  •  potassium chloride (K-DUR,KLOR-CON) 20 MEQ CR tablet, take 1 tablet by mouth once daily, Disp: 30 tablet, Rfl: 5  •  prasugrel (EFFIENT) 10 MG tablet, Take 1 tablet by mouth Daily., Disp: 30 tablet, Rfl: 5  •  sacubitril-valsartan (ENTRESTO) 24-26 MG tablet, Take 1 tablet by mouth 2 (Two) Times a Day., Disp: 180 tablet, Rfl: 3  •  spironolactone (ALDACTONE) 25 MG tablet, Take 1 tablet by mouth Daily. (Patient taking differently: Take 25 mg by mouth As Needed.), Disp: 90 tablet, Rfl: 1  •  VENTOLIN  (90 Base) MCG/ACT inhaler, Inhale 2 puffs Every 6 (Six) Hours As Needed for Wheezing., Disp: 1 inhaler, Rfl: 6  •  amiodarone (PACERONE) 200 MG tablet, Take 1 tablet by mouth 2 (Two) Times a Day. For 14 days then decrease to 1 tablet daily, Disp: 30 tablet, Rfl: 5    Past Medical History, Past Surgical History, Family history, Social History, and Medications were all reviewed with the patient today and updated as necessary.       Patient Active Problem List   Diagnosis   • Coronary artery disease involving native coronary artery of native heart without angina pectoris   • Ischemic cardiomyopathy   • Peripheral vascular disease (CMS/HCC)   • Cerebrovascular accident (CMS/HCC)   • PAF (paroxysmal atrial fibrillation) (CMS/HCC)   • Hyperlipidemia LDL goal <70   • Chronic kidney disease (CKD), stage III (moderate) (CMS/HCC)   • Left bundle branch block   • Essential hypertension   • COPD   • Cardiac resynchronization therapy defibrillator (CRT-D) in place   • Carotid occlusion, left   • Leukocytosis   • Silent aspiration   • Dyslipidemia     No Known Allergies  Past Medical History:   Diagnosis Date   • Acute on chronic respiratory failure (CMS/HCC)     Recent acute-on-chronic respiratory failure with hospital admission for pneumonia on ventilator.   • AICD (automatic cardioverter/defibrillator) present    • Atrial fibrillation (CMS/HCC)     Atrial fibrillation on presentation to outside  hospital converted to sinus with Cardizem drip and subsequent recurrence of atrial fibrillation while in the ICU, treated with amiodarone and returned to sinus, August 2014.   • CAD (coronary artery disease)    • Cerebrovascular accident (CMS/[facility])     Cerebrovascular accident and initiation of warfarin therapy, March 2013.   • Chronic kidney disease (CKD), stage III (moderate) (CMS/[facility])    • COPD (chronic obstructive pulmonary disease) (CMS/[facility])    • Dyslipidemia    • History of hypertension    • Ischemic cardiomyopathy    • Ischemic heart disease    • Left bundle branch block     Left bundle branch block, probably combined ischemic/nonischemic cardiomyopathy.   • Peripheral vascular disease (CMS/[facility])      Past Surgical History:   Procedure Laterality Date   • AXILLARY AXILLARY BYPASS     • CARDIAC CATHETERIZATION      stents   • CARDIAC CATHETERIZATION N/A 2/12/2020    Procedure: Left Heart Cath;  Surgeon: Guillaume Shore MD;  Location:  [facility] CATH INVASIVE LOCATION;  Service: Cardiology;  Laterality: N/A;   • CARDIAC DEFIBRILLATOR PLACEMENT     • CARDIAC ELECTROPHYSIOLOGY PROCEDURE N/A 3/13/2019    Procedure: BIV ICD generator change- SJM in 4-6 weeks.;  Surgeon: Reza Lopes MD;  Location:  [facility] EP INVASIVE LOCATION;  Service: Cardiology   • CAROTID STENT     • INSERT / REPLACE / REMOVE PACEMAKER       Family History   Problem Relation Age of Onset   • Dementia Mother    • Prostate cancer Father    • Heart disease Father    • Cancer Brother         kidney   • No Known Problems Sister    • Heart failure Maternal Grandmother    • Heart disease Maternal Grandmother    • Heart attack Maternal Grandmother    • Heart disease Maternal Grandfather    • Heart attack Maternal Grandfather    • No Known Problems Paternal Grandmother    • Heart attack Paternal Grandfather    • No Known Problems Sister    • Heart attack Paternal Uncle    • Heart disease Paternal Uncle      Social History     Tobacco Use   • Smoking  "status: Former Smoker     Packs/day: 1.00     Years: 48.00     Pack years: 48.00     Types: Cigarettes     Quit date: 11/27/2017     Years since quitting: 3.4   • Smokeless tobacco: Never Used   • Tobacco comment: Pt quit smoking 6-7 months ago   Substance Use Topics   • Alcohol use: No         PHYSICAL EXAM:    /54 (BP Location: Left arm, Patient Position: Sitting)   Pulse 77   Temp 96.2 °F (35.7 °C)   Ht 177.8 cm (70\")   Wt 76.6 kg (168 lb 12.8 oz)   SpO2 99%   BMI 24.22 kg/m²        Wt Readings from Last 5 Encounters:   04/26/21 76.6 kg (168 lb 12.8 oz)   02/24/21 80.5 kg (177 lb 6.4 oz)   01/26/21 81.2 kg (179 lb)   01/05/21 78.7 kg (173 lb 6.4 oz)   10/20/20 81.7 kg (180 lb 3.2 oz)       BP Readings from Last 5 Encounters:   04/26/21 102/54   02/24/21 105/54   01/26/21 118/64   01/05/21 105/56   10/20/20 130/70       General-Well Nourished, Well developed  Eyes - PERRLA  Neck- supple, No mass  CV- regular rate and rhythm, no MRG,  Lung- clear bilaterally  Abd- soft, +BS  Musc/skel - Norm strength and range of motion; +2 pedal edema   Skin- warm and dry  Neuro - Alert & Oriented x 3, appropriate mood.        Medical problems and test results were reviewed with the patient today.     No results found for this or any previous visit (from the past 672 hour(s)).      EKG: (EKG has been independently visualized by me and summarized below)      ECG 12 Lead    Date/Time: 4/26/2021 12:06 PM  Performed by: Celia Vela PA  Authorized by: Celia Vela PA   Comparison: compared with previous ECG from 10/20/2020  Similar to previous ECG  Rhythm: paced  Rate: normal  BPM: 77    Clinical impression: abnormal EKG             ASSESSMENT and PLAN    1. Acute on Chronic Systolic HF- in setting of persistent afib x 4 weeks. He tells me is taking 20mg of Lasix daily although we do not have record of that medication. I have instructed him to increase to BID dosing for the next 2 days until he presents for his " cardioversion. He will likely benefit from IV Lasix at that time. I requested he go to our lab today for blood work, but he refused and wants to wait until he returns on Wednesday. Continue optimal rx with Coreg, Entresto, Aldactone.   2.  Biventricular ICD-we'll monitor in device clinic. Post Gen Change in March 2019. Stable function. 38% mode switched.    3.  Atrial Fibrillation - previously maintaining sinus. He is now 38% mode switched and has been in afib almost persistently since mid March. Now with worsening CHF symptoms. I will restart Amiodarone 200mg BID today as we are limited with AADs due to CKD and structural heart disease. He will present for ECV on Wednesday of this week. No missed doses of NOAC. Continue Eliquis 5mg BID. Long run will likely need PVA.    4.  Use of Amio - discontinued at last visit. Plan to restart today.   5.  CAD- recent NACHO to mid left circumflex 2/2020. Noted to have significant CAD unamendable to revascularization. Continue Effient, statin. Dr. Guevara/Patrick are addressing. No anginal symptoms today.       Return for after procedure.    Cardioversion   Restart Amiodarone 200mg BID x 14 days, then 200mg daily.   Check ProBNP, CMP, TSH/T4, Mag at that time   Increase Lasix to 20mg BID x 3 days.     Celia Vela PA-C   Cardiology/Electrophysiology  4/26/2021  12:05 EDT

## 2021-04-28 ENCOUNTER — HOSPITAL ENCOUNTER (OUTPATIENT)
Dept: CARDIOLOGY | Facility: HOSPITAL | Age: 66
Discharge: HOME | End: 2021-04-28
Attending: INTERNAL MEDICINE | Admitting: INTERNAL MEDICINE

## 2021-04-28 VITALS — SYSTOLIC BLOOD PRESSURE: 95 MMHG | HEART RATE: 72 BPM | DIASTOLIC BLOOD PRESSURE: 68 MMHG | OXYGEN SATURATION: 96 %

## 2021-04-28 DIAGNOSIS — I48.0 PAF (PAROXYSMAL ATRIAL FIBRILLATION) (HCC): ICD-10-CM

## 2021-04-28 DIAGNOSIS — I25.5 ISCHEMIC CARDIOMYOPATHY: Primary | ICD-10-CM

## 2021-04-28 LAB
ALBUMIN SERPL-MCNC: 4.4 G/DL (ref 3.5–5.2)
ALBUMIN/GLOB SERPL: 2.2 G/DL
ALP SERPL-CCNC: 111 U/L (ref 39–117)
ALT SERPL W P-5'-P-CCNC: 14 U/L (ref 1–41)
ANION GAP SERPL CALCULATED.3IONS-SCNC: 10 MMOL/L (ref 5–15)
AST SERPL-CCNC: 16 U/L (ref 1–40)
BASOPHILS # BLD AUTO: 0.09 10*3/MM3 (ref 0–0.2)
BASOPHILS NFR BLD AUTO: 1.1 % (ref 0–1.5)
BILIRUB SERPL-MCNC: 1.1 MG/DL (ref 0–1.2)
BUN SERPL-MCNC: 23 MG/DL (ref 8–23)
BUN/CREAT SERPL: 13.3 (ref 7–25)
CALCIUM SPEC-SCNC: 9.4 MG/DL (ref 8.6–10.5)
CHLORIDE SERPL-SCNC: 100 MMOL/L (ref 98–107)
CO2 SERPL-SCNC: 26 MMOL/L (ref 22–29)
CREAT SERPL-MCNC: 1.73 MG/DL (ref 0.76–1.27)
DEPRECATED RDW RBC AUTO: 47.8 FL (ref 37–54)
EOSINOPHIL # BLD AUTO: 0.23 10*3/MM3 (ref 0–0.4)
EOSINOPHIL NFR BLD AUTO: 2.7 % (ref 0.3–6.2)
ERYTHROCYTE [DISTWIDTH] IN BLOOD BY AUTOMATED COUNT: 14.2 % (ref 12.3–15.4)
GFR SERPL CREATININE-BSD FRML MDRD: 40 ML/MIN/1.73
GLOBULIN UR ELPH-MCNC: 2 GM/DL
GLUCOSE SERPL-MCNC: 103 MG/DL (ref 65–99)
HCT VFR BLD AUTO: 47.2 % (ref 37.5–51)
HGB BLD-MCNC: 15.2 G/DL (ref 13–17.7)
IMM GRANULOCYTES # BLD AUTO: 0.02 10*3/MM3 (ref 0–0.05)
IMM GRANULOCYTES NFR BLD AUTO: 0.2 % (ref 0–0.5)
LYMPHOCYTES # BLD AUTO: 1.95 10*3/MM3 (ref 0.7–3.1)
LYMPHOCYTES NFR BLD AUTO: 22.9 % (ref 19.6–45.3)
MAGNESIUM SERPL-MCNC: 2.3 MG/DL (ref 1.6–2.4)
MAXIMAL PREDICTED HEART RATE: 154 BPM
MCH RBC QN AUTO: 29.5 PG (ref 26.6–33)
MCHC RBC AUTO-ENTMCNC: 32.2 G/DL (ref 31.5–35.7)
MCV RBC AUTO: 91.7 FL (ref 79–97)
MONOCYTES # BLD AUTO: 0.59 10*3/MM3 (ref 0.1–0.9)
MONOCYTES NFR BLD AUTO: 6.9 % (ref 5–12)
NEUTROPHILS NFR BLD AUTO: 5.65 10*3/MM3 (ref 1.7–7)
NEUTROPHILS NFR BLD AUTO: 66.2 % (ref 42.7–76)
NRBC BLD AUTO-RTO: 0 /100 WBC (ref 0–0.2)
NT-PROBNP SERPL-MCNC: 5370 PG/ML (ref 0–900)
PLATELET # BLD AUTO: 181 10*3/MM3 (ref 140–450)
PMV BLD AUTO: 10.5 FL (ref 6–12)
POTASSIUM SERPL-SCNC: 4.6 MMOL/L (ref 3.5–5.2)
PROT SERPL-MCNC: 6.4 G/DL (ref 6–8.5)
RBC # BLD AUTO: 5.15 10*6/MM3 (ref 4.14–5.8)
SODIUM SERPL-SCNC: 136 MMOL/L (ref 136–145)
STRESS TARGET HR: 131 BPM
WBC # BLD AUTO: 8.53 10*3/MM3 (ref 3.4–10.8)

## 2021-04-28 PROCEDURE — 92960 CARDIOVERSION ELECTRIC EXT: CPT

## 2021-04-28 PROCEDURE — 92960 CARDIOVERSION ELECTRIC EXT: CPT | Performed by: INTERNAL MEDICINE

## 2021-04-28 PROCEDURE — 83735 ASSAY OF MAGNESIUM: CPT | Performed by: PHYSICIAN ASSISTANT

## 2021-04-28 PROCEDURE — 85025 COMPLETE CBC W/AUTO DIFF WBC: CPT | Performed by: PHYSICIAN ASSISTANT

## 2021-04-28 PROCEDURE — S0260 H&P FOR SURGERY: HCPCS | Performed by: INTERNAL MEDICINE

## 2021-04-28 PROCEDURE — 36415 COLL VENOUS BLD VENIPUNCTURE: CPT

## 2021-04-28 PROCEDURE — 83880 ASSAY OF NATRIURETIC PEPTIDE: CPT | Performed by: PHYSICIAN ASSISTANT

## 2021-04-28 PROCEDURE — 80053 COMPREHEN METABOLIC PANEL: CPT | Performed by: PHYSICIAN ASSISTANT

## 2021-04-28 PROCEDURE — 93005 ELECTROCARDIOGRAM TRACING: CPT | Performed by: INTERNAL MEDICINE

## 2021-04-28 PROCEDURE — 25010000002 MIDAZOLAM PER 1 MG: Performed by: INTERNAL MEDICINE

## 2021-04-28 RX ORDER — MIDAZOLAM HYDROCHLORIDE 1 MG/ML
INJECTION INTRAMUSCULAR; INTRAVENOUS
Status: DISCONTINUED
Start: 2021-04-28 | End: 2021-04-28 | Stop reason: HOSPADM

## 2021-04-28 RX ORDER — FLUMAZENIL 0.1 MG/ML
INJECTION INTRAVENOUS
Status: DISCONTINUED
Start: 2021-04-28 | End: 2021-04-28 | Stop reason: WASHOUT

## 2021-04-28 RX ORDER — MIDAZOLAM HYDROCHLORIDE 1 MG/ML
INJECTION INTRAMUSCULAR; INTRAVENOUS
Status: COMPLETED | OUTPATIENT
Start: 2021-04-28 | End: 2021-04-28

## 2021-04-28 RX ORDER — NALOXONE HYDROCHLORIDE 0.4 MG/ML
INJECTION, SOLUTION INTRAMUSCULAR; INTRAVENOUS; SUBCUTANEOUS
Status: DISCONTINUED
Start: 2021-04-28 | End: 2021-04-28 | Stop reason: WASHOUT

## 2021-04-28 RX ORDER — FENTANYL CITRATE 50 UG/ML
INJECTION, SOLUTION INTRAMUSCULAR; INTRAVENOUS
Status: DISCONTINUED
Start: 2021-04-28 | End: 2021-04-28 | Stop reason: WASHOUT

## 2021-04-28 RX ADMIN — MIDAZOLAM 2 MG: 1 INJECTION INTRAMUSCULAR; INTRAVENOUS at 12:38

## 2021-04-28 RX ADMIN — METHOHEXITAL SODIUM 20 MG: 500 INJECTION, POWDER, LYOPHILIZED, FOR SOLUTION INTRAMUSCULAR; INTRAVENOUS; RECTAL at 12:38

## 2021-04-29 LAB
QT INTERVAL: 488 MS
QTC INTERVAL: 556 MS

## 2021-05-13 ENCOUNTER — TELEPHONE (OUTPATIENT)
Dept: CARDIOLOGY | Facility: CLINIC | Age: 66
End: 2021-05-13

## 2021-05-19 NOTE — TELEPHONE ENCOUNTER
"Pt's girlfriend Hannah Herrera called and stated that pt's feet and legs are very swollen. Lasix was stopped due to elevated Creatinine and BUN. Pt's BP runs low, hands are cold, and patient has lost weight, down to 160 lbs. Patient is drinking a lot of water. Patient has been feeling bad for past month. Next appt with Dr. Lopes 5/25/2021.    Denies warmth/tenderness in legs, chest pain,     Pt's SOB has increased from baseline in past 2 weeks. Using inhaler and breathing treatments more often. Hannah states that patient seemed to start \"going down hill\" about a week after starting Farxiga.    Pt currently taking:  Entresto 24-26 mg BID  Amiodarone 200 mg daily   Effient 10 mg daily  Potassium 20 mEq daily  Eliquis 5 mg BID  Farxiga 10 mg daily  Coreg 6.25 mg BID    Please advise.       "

## 2021-05-19 NOTE — TELEPHONE ENCOUNTER
Noted; very perplexing.  Any recent laboratory studies to review?  Would discontinue Farxiga and restart torsemide 20 mg daily and update us in 2 to 3 days.  If progressive disabling symptoms develop he needs to come to [facility] ED immediately.    Thanks!

## 2021-05-20 RX ORDER — TORSEMIDE 20 MG/1
20 TABLET ORAL DAILY
Qty: 90 TABLET | Refills: 1 | Status: SHIPPED | OUTPATIENT
Start: 2021-05-20 | End: 2021-05-21 | Stop reason: SDUPTHER

## 2021-05-20 NOTE — TELEPHONE ENCOUNTER
Hannah called back, gave KTS recommendations above. Hannah verbalizes understanding. Most recent labs in [facility].     Lab Results   Component Value Date    GLUCOSE 101 (H) 05/13/2021    CALCIUM 9.8 05/13/2021     05/13/2021    K 4.4 05/13/2021    CO2 26.0 05/13/2021     05/13/2021    BUN 24 (H) 05/13/2021    CREATININE 1.80 (H) 05/13/2021    EGFRIFNONA 38 (L) 05/13/2021    BCR 13.3 05/13/2021    ANIONGAP 12.0 05/13/2021

## 2021-05-21 DIAGNOSIS — J96.20 ACUTE ON CHRONIC RESPIRATORY FAILURE, UNSPECIFIED WHETHER WITH HYPOXIA OR HYPERCAPNIA (HCC): ICD-10-CM

## 2021-05-21 RX ORDER — TORSEMIDE 20 MG/1
20 TABLET ORAL DAILY
Qty: 90 TABLET | Refills: 1 | Status: SHIPPED | OUTPATIENT
Start: 2021-05-21 | End: 2021-09-30

## 2021-05-24 RX ORDER — SPIRONOLACTONE 25 MG/1
25 TABLET ORAL DAILY
Qty: 90 TABLET | Refills: 1 | Status: SHIPPED | OUTPATIENT
Start: 2021-05-24 | End: 2021-11-29

## 2021-05-25 ENCOUNTER — LAB (OUTPATIENT)
Dept: LAB | Facility: HOSPITAL | Age: 66
End: 2021-05-25

## 2021-05-25 ENCOUNTER — OFFICE VISIT (OUTPATIENT)
Dept: CARDIOLOGY | Facility: CLINIC | Age: 66
End: 2021-05-25

## 2021-05-25 VITALS
SYSTOLIC BLOOD PRESSURE: 108 MMHG | DIASTOLIC BLOOD PRESSURE: 60 MMHG | OXYGEN SATURATION: 96 % | HEIGHT: 70 IN | BODY MASS INDEX: 23.11 KG/M2 | HEART RATE: 72 BPM | WEIGHT: 161.4 LBS

## 2021-05-25 DIAGNOSIS — I50.22 CHRONIC SYSTOLIC CONGESTIVE HEART FAILURE (HCC): Primary | ICD-10-CM

## 2021-05-25 DIAGNOSIS — Z79.899 LONG TERM CURRENT USE OF ANTIARRHYTHMIC MEDICAL THERAPY: ICD-10-CM

## 2021-05-25 DIAGNOSIS — I50.22 CHRONIC SYSTOLIC CONGESTIVE HEART FAILURE (HCC): ICD-10-CM

## 2021-05-25 DIAGNOSIS — I48.0 PAF (PAROXYSMAL ATRIAL FIBRILLATION) (HCC): ICD-10-CM

## 2021-05-25 DIAGNOSIS — N18.32 STAGE 3B CHRONIC KIDNEY DISEASE (HCC): ICD-10-CM

## 2021-05-25 DIAGNOSIS — Z95.810 CARDIAC RESYNCHRONIZATION THERAPY DEFIBRILLATOR (CRT-D) IN PLACE: ICD-10-CM

## 2021-05-25 PROCEDURE — 93284 PRGRMG EVAL IMPLANTABLE DFB: CPT | Performed by: INTERNAL MEDICINE

## 2021-05-25 PROCEDURE — 93295 DEV INTERROG REMOTE 1/2/MLT: CPT | Performed by: INTERNAL MEDICINE

## 2021-05-25 PROCEDURE — 99215 OFFICE O/P EST HI 40 MIN: CPT | Performed by: INTERNAL MEDICINE

## 2021-05-25 PROCEDURE — 93296 REM INTERROG EVL PM/IDS: CPT | Performed by: INTERNAL MEDICINE

## 2021-05-25 RX ORDER — SACUBITRIL AND VALSARTAN 24; 26 MG/1; MG/1
1 TABLET, FILM COATED ORAL DAILY
COMMUNITY
End: 2021-08-26

## 2021-05-25 NOTE — PROGRESS NOTES
Reza Mcclelland  1955  PCP: Pearl Sabillon PA    SUBJECTIVE:   Reza Mcclelland is a 66 y.o. male seen for a follow up visit regarding the following:     Chief Complaint: Follow up for afib, biv icd     HPI:    Since last visit the patient had cardiac unstable.  Worsening heart failure symptoms.  Had recurrence of atrial fibrillation. However, He has been feeling better of Farxiga and starting Torsemide. Still having swelling.     History:  This is a 66-year-old patient followed by Dr. Andrea Guevara.  He has a history of extensive coronary artery disease and chronic systolic heart failure.  He has a previously implanted biventricular ICD.  He continues to have class II to class III angina symptoms. He has quit smoking        Cardiac PMH: (Old records have been reviewed and summarized below)  1. Ischemic heart disease/ischemic cardiomyopathy:  a. Remote non-ST-elevation myocardial infarction with emergent left heart catheterization and successful percutaneous transluminal coronary angioplasty and stenting of the ramus intermedius with a 3.0 x 12 mm Xience drug-eluting stent, 11/19/2008.  b. Left heart catheterization revealing severe stenosis in the LAD of about 80% to 90% in the mid portion with 2.5 x 28 mm, 3.0 x 28 mm, and 3.5 x 18 mm Xience drug-eluting stents in the mid and proximal portion of the LAD, critical left anterior descending stenosis with recommendations to return in 2 weeks for further intervention, 12/04/2008.  c. Left heart catheterization revealing critical right coronary artery stenosis with 3.5 x 28 mm Xience drug-eluting stent deployed in the mid RCA and Xience drug-eluting stent deployed in the proximal RCA, also revealing severe peripheral vascular disease with critical bilateral stenosis of common iliac artery with recommendations for the patient to remain on Plavix and aspirin and further evaluation of iliac stenosis, 12/18/2008.  d. Recurrent CCS class II-III angina with  subsequent left heart catheterization and stenting with a 3.5 x 15 mm Xience drug-eluting stent to the proximal LAD and 2.5 x 18 mm Xience drug-eluting stent into the distal LAD with widely patent stents in the RCA, 90% lesion in the stent of the LAD and widely patent stent of the diagonal branch. LVEF (0.65) with no wall motion abnormalities. Severe peripheral arterial disease with 60% stenosis of the left common iliac artery and 80% to 90% stenosis in the distal common femoral artery with recommendations for abdominal aortogram with right and left lower extremity arteriogram, 01/21/2011.  e. Cardiac catheterization with percutaneous transluminal angioplasty and self-expanding stent placement to the proximal innominate artery, distal embolic protection filter placement in the right carotid artery for cerebral protection during procedure, 10 x 20 mm Xact self-expanding stent deployed to the proximal innominate stenosis, total occlusion of the proximal left internal carotid artery, 11/29/2011.  f. Markedly reduced echocardiographic LVEF (0.25) with left ventricular chamber enlargement and thickened mitral valve leaflets with moderate MR in the absence of pericardial effusion or intracavitary thrombus/mass with markedly abnormal EKG demonstrating LBBB with marked QRS widening, spring 2013.  g. Recurrent progressive chest pain syndrome with severe single-vessel obstructive coronary disease and 100% total occlusion proximal right coronary artery at site of previously placed stent with mild nonobstructive plaque in the left coronary artery system and previously placed stents in the LAD, ramus intermedius artery patent with left ventriculography deferred and moderate bilateral common iliac disease, April 2013, with subsequent bi-ventricular AICD implant ([employer], model #[id number]) by Dr. Kong.  h. Acute non-ST elevation myocardial infarction/left heart catheterization, Dr. Lemus, with Xience drug-eluting  stent to the ramus intermedius: Nonobstructive 50% to 60% mid to distal left anterior descending stenosis. Chronic total occlusion of the dominant right coronary artery served by left-sided collaterals with left ventricular systolic dysfunction. EF 10% to 15%, 08/20/2014.  i. Biventricular ICD interrogation 7/7/17; RA pacing 72%, RV pacing 96% longevity 1.8 years, mode switch less than 1%, longest was 7 hours 5/9/17, 1 noise reversion both channels  j. Residual CCS class I angina pectoris/NYHA class II-III exertional dyspnea and fatigue.   k. Recent apparent acute congestive heart failure with non-STEMI and possible bronchopneumonia with respiratory failure requiring intubation and apparent diagnostic coronary angiography with 3 stents placed with subsequent apparent AMA discharge - data deficit, [facility], [place], operated by [facility], [place], [place], November 2017.  l. Residual CCS class I angina pectoris/NYHA class III-IV biventricular CHF, December 2017.  m. Residual class I symptoms, June 2018  2. Peripheral vascular disease:  a. Cardiac catheterization revealing total occlusion of left  internal carotid artery previously in 2011.  b. CT angiogram of the neck showing total occlusion of the left internal carotid artery, 50% to 60% stenosis of the right carotid bulb, stent in the brachiocephalic artery placed by Dr. Santamaria in November 2011, totally occluded, March 2013.  c. Occluded left carotid artery with occluded innominate artery and intracranial circulation via thyroid artery collaterals and left vertebral with left axillary to right axillary bypass graft using 8 mm. PTFE ringed Propaten graft with arteriogram to the right subclavian artery and right axillary artery and right carotid artery, April 2013.  d. Remote hospitalization, [facility], with congestive heart failure and stroke - data deficit (December 2017), with apparent acute injury noted.  e. Cerebrovascular accident and  initiation of warfarin  therapy, March 2013.  3. Atrial fibrillation on presentation to outside hospital converted to sinus with Cardizem drip and subsequent recurrence of atrial fibrillation while in the ICU, treated with amiodarone and returned to sinus, August 2014.  4. Dyslipidemia.  5. Stage 3 chronic kidney disease.   6. Left bundle branch block, probably combined ischemic/nonischemic cardiomyopathy.  7. Remote acute-on-chronic respiratory failure with hospital admission for pneumonia on ventilator.  8. History of hypertension.  9. Tobacco abuse, previously resolved but recurrent, with cessation using Chantix, winter of 2015.  10. No prior additional surgical intervention.   11. Progressive erectile dysfunction.  12. Serial acceptable AICD interrogations: February 2014; August 2014; March 2015, July 2017, with home [facility]lin monitoring.  13. Hyperthyroidism with abnormal thyroid ultrasound documenting thyroid nodule-data deficit.  2017      Current Outpatient Medications:   •  albuterol (PROVENTIL) (5 MG/ML) 0.5% nebulizer solution, Take 0.5 mL by nebulization Every 6 (Six) Hours As Needed for Wheezing., Disp: 1 mL, Rfl: 12  •  amiodarone (PACERONE) 200 MG tablet, Take 1 tablet by mouth 2 (Two) Times a Day. For 14 days then decrease to 1 tablet daily, Disp: 30 tablet, Rfl: 5  •  apixaban (ELIQUIS) 5 MG tablet tablet, Take 1 tablet by mouth Every 12 (Twelve) Hours., Disp: 60 tablet, Rfl: 5  •  atorvastatin (LIPITOR) 80 MG tablet, Take 1 tablet by mouth every night at bedtime., Disp: 90 tablet, Rfl: 3  •  carvedilol (COREG) 6.25 MG tablet, TAKE 1 TABLET BY MOUTH TWICE A DAY WITH FOOD (Patient taking differently: Take 6.25 mg by mouth.), Disp: 180 tablet, Rfl: 3  •  nitroglycerin (NITROSTAT) 0.4 MG SL tablet, 1 under the tongue as needed for angina, may repeat q5mins for up three doses, Disp: 100 tablet, Rfl: 11  •  potassium chloride (K-DUR,KLOR-CON) 20 MEQ CR tablet, take 1 tablet by mouth once daily (Patient  taking differently: Take 20 mEq by mouth. Do not crush. Take with food.), Disp: 30 tablet, Rfl: 5  •  prasugrel (EFFIENT) 10 MG tablet, Take 1 tablet by mouth Daily., Disp: 30 tablet, Rfl: 5  •  sacubitril-valsartan (Entresto) 24-26 MG tablet, Take 1 tablet by mouth Daily., Disp: , Rfl:   •  spironolactone (ALDACTONE) 25 MG tablet, TAKE 1 TABLET BY MOUTH DAILY, Disp: 90 tablet, Rfl: 1  •  torsemide (DEMADEX) 20 MG tablet, Take 1 tablet by mouth Daily., Disp: 90 tablet, Rfl: 1  •  VENTOLIN  (90 Base) MCG/ACT inhaler, Inhale 2 puffs Every 6 (Six) Hours As Needed for Wheezing., Disp: 1 inhaler, Rfl: 6    Past Medical History, Past Surgical History, Family history, Social History, and Medications were all reviewed with the patient today and updated as necessary.       Patient Active Problem List   Diagnosis   • Coronary artery disease involving native coronary artery of native heart without angina pectoris   • Ischemic cardiomyopathy   • Peripheral vascular disease (CMS/HCC)   • Cerebrovascular accident (CMS/HCC)   • PAF (paroxysmal atrial fibrillation) (CMS/[facility])   • Hyperlipidemia LDL goal <70   • Chronic kidney disease (CKD), stage III (moderate) (CMS/HCC)   • Left bundle branch block   • Essential hypertension   • COPD   • Cardiac resynchronization therapy defibrillator (CRT-D) in place   • Carotid occlusion, left   • Leukocytosis   • Silent aspiration   • Dyslipidemia     No Known Allergies  Past Medical History:   Diagnosis Date   • Acute on chronic respiratory failure (CMS/HCC)     Recent acute-on-chronic respiratory failure with hospital admission for pneumonia on ventilator.   • AICD (automatic cardioverter/defibrillator) present    • Atrial fibrillation (CMS/HCC)     Atrial fibrillation on presentation to outside hospital converted to sinus with Cardizem drip and subsequent recurrence of atrial fibrillation while in the ICU, treated with amiodarone and returned to sinus, August 2014.   • CAD (coronary  artery disease)    • Cerebrovascular accident (CMS/HCC)     Cerebrovascular accident and initiation of warfarin therapy, March 2013.   • Chronic kidney disease (CKD), stage III (moderate) (CMS/HCC)    • COPD (chronic obstructive pulmonary disease) (CMS/[facility])    • Dyslipidemia    • History of hypertension    • Ischemic cardiomyopathy    • Ischemic heart disease    • Left bundle branch block     Left bundle branch block, probably combined ischemic/nonischemic cardiomyopathy.   • Peripheral vascular disease (CMS/[facility])      Past Surgical History:   Procedure Laterality Date   • AXILLARY AXILLARY BYPASS     • CARDIAC CATHETERIZATION      stents   • CARDIAC CATHETERIZATION N/A 2/12/2020    Procedure: Left Heart Cath;  Surgeon: Guillaume Shore MD;  Location:  [facility] CATH INVASIVE LOCATION;  Service: Cardiology;  Laterality: N/A;   • CARDIAC DEFIBRILLATOR PLACEMENT     • CARDIAC ELECTROPHYSIOLOGY PROCEDURE N/A 3/13/2019    Procedure: BIV ICD generator change- SJM in 4-6 weeks.;  Surgeon: Reza Lopes MD;  Location:  [facility] EP INVASIVE LOCATION;  Service: Cardiology   • CAROTID STENT     • INSERT / REPLACE / REMOVE PACEMAKER       Family History   Problem Relation Age of Onset   • Dementia Mother    • Prostate cancer Father    • Heart disease Father    • Cancer Brother         kidney   • No Known Problems Sister    • Heart failure Maternal Grandmother    • Heart disease Maternal Grandmother    • Heart attack Maternal Grandmother    • Heart disease Maternal Grandfather    • Heart attack Maternal Grandfather    • No Known Problems Paternal Grandmother    • Heart attack Paternal Grandfather    • No Known Problems Sister    • Heart attack Paternal Uncle    • Heart disease Paternal Uncle      Social History     Tobacco Use   • Smoking status: Former Smoker     Packs/day: 1.00     Years: 48.00     Pack years: 48.00     Types: Cigarettes     Quit date: 11/27/2017     Years since quitting: 3.4   • Smokeless tobacco: Never Used  "  • Tobacco comment: Pt quit smoking 6-7 months ago   Substance Use Topics   • Alcohol use: No         PHYSICAL EXAM:    /60 (BP Location: Left arm, Patient Position: Sitting)   Pulse 72   Ht 177.8 cm (70\")   Wt 73.2 kg (161 lb 6.4 oz)   SpO2 96%   BMI 23.16 kg/m²        Wt Readings from Last 5 Encounters:   05/25/21 73.2 kg (161 lb 6.4 oz)   04/26/21 76.6 kg (168 lb 12.8 oz)   02/24/21 80.5 kg (177 lb 6.4 oz)   01/26/21 81.2 kg (179 lb)   01/05/21 78.7 kg (173 lb 6.4 oz)       BP Readings from Last 5 Encounters:   05/25/21 108/60   04/28/21 95/68   04/26/21 102/54   02/24/21 105/54   01/26/21 118/64       General-Well Nourished, Well developed  Eyes - PERRLA  Neck- supple, No mass  CV- regular rate and rhythm, no MRG, No edema  Lung- clear bilaterally  Abd- soft, +BS  Musc/skel - Norm strength and range of motion  Skin- warm and dry  Neuro - Alert & Oriented x 3, appropriate mood.        Medical problems and test results were reviewed with the patient today.     Recent Results (from the past 672 hour(s))   Cardioversion External in Cardiology Department    Collection Time: 04/28/21 12:09 PM   Result Value Ref Range    Target HR (85%) 131 bpm    Max. Pred. HR (100%) 154 bpm   Comprehensive Metabolic Panel    Collection Time: 04/28/21 12:26 PM    Specimen: Arm, Right; Blood   Result Value Ref Range    Glucose 103 (H) 65 - 99 mg/dL    BUN 23 8 - 23 mg/dL    Creatinine 1.73 (H) 0.76 - 1.27 mg/dL    Sodium 136 136 - 145 mmol/L    Potassium 4.6 3.5 - 5.2 mmol/L    Chloride 100 98 - 107 mmol/L    CO2 26.0 22.0 - 29.0 mmol/L    Calcium 9.4 8.6 - 10.5 mg/dL    Total Protein 6.4 6.0 - 8.5 g/dL    Albumin 4.40 3.50 - 5.20 g/dL    ALT (SGPT) 14 1 - 41 U/L    AST (SGOT) 16 1 - 40 U/L    Alkaline Phosphatase 111 39 - 117 U/L    Total Bilirubin 1.1 0.0 - 1.2 mg/dL    eGFR Non African Amer 40 (L) >60 mL/min/1.73    Globulin 2.0 gm/dL    A/G Ratio 2.2 g/dL    BUN/Creatinine Ratio 13.3 7.0 - 25.0    Anion Gap 10.0 5.0 - " 15.0 mmol/L   Magnesium    Collection Time: 04/28/21 12:26 PM    Specimen: Arm, Right; Blood   Result Value Ref Range    Magnesium 2.3 1.6 - 2.4 mg/dL   proBNP    Collection Time: 04/28/21 12:26 PM    Specimen: Arm, Right; Blood   Result Value Ref Range    proBNP 5,370.0 (H) 0.0 - 900.0 pg/mL   CBC Auto Differential    Collection Time: 04/28/21 12:26 PM    Specimen: Blood   Result Value Ref Range    WBC 8.53 3.40 - 10.80 10*3/mm3    RBC 5.15 4.14 - 5.80 10*6/mm3    Hemoglobin 15.2 13.0 - 17.7 g/dL    Hematocrit 47.2 37.5 - 51.0 %    MCV 91.7 79.0 - 97.0 fL    MCH 29.5 26.6 - 33.0 pg    MCHC 32.2 31.5 - 35.7 g/dL    RDW 14.2 12.3 - 15.4 %    RDW-SD 47.8 37.0 - 54.0 fl    MPV 10.5 6.0 - 12.0 fL    Platelets 181 140 - 450 10*3/mm3    Neutrophil % 66.2 42.7 - 76.0 %    Lymphocyte % 22.9 19.6 - 45.3 %    Monocyte % 6.9 5.0 - 12.0 %    Eosinophil % 2.7 0.3 - 6.2 %    Basophil % 1.1 0.0 - 1.5 %    Immature Grans % 0.2 0.0 - 0.5 %    Neutrophils, Absolute 5.65 1.70 - 7.00 10*3/mm3    Lymphocytes, Absolute 1.95 0.70 - 3.10 10*3/mm3    Monocytes, Absolute 0.59 0.10 - 0.90 10*3/mm3    Eosinophils, Absolute 0.23 0.00 - 0.40 10*3/mm3    Basophils, Absolute 0.09 0.00 - 0.20 10*3/mm3    Immature Grans, Absolute 0.02 0.00 - 0.05 10*3/mm3    nRBC 0.0 0.0 - 0.2 /100 WBC   ECG 12 Lead    Collection Time: 04/28/21 12:53 PM   Result Value Ref Range    QT Interval 488 ms    QTC Interval 556 ms   Basic Metabolic Panel    Collection Time: 05/13/21 11:25 AM    Specimen: Blood   Result Value Ref Range    Glucose 101 (H) 65 - 99 mg/dL    BUN 24 (H) 8 - 23 mg/dL    Creatinine 1.80 (H) 0.76 - 1.27 mg/dL    Sodium 138 136 - 145 mmol/L    Potassium 4.4 3.5 - 5.2 mmol/L    Chloride 100 98 - 107 mmol/L    CO2 26.0 22.0 - 29.0 mmol/L    Calcium 9.8 8.6 - 10.5 mg/dL    eGFR Non African Amer 38 (L) >60 mL/min/1.73    BUN/Creatinine Ratio 13.3 7.0 - 25.0    Anion Gap 12.0 5.0 - 15.0 mmol/L         EKG: (EKG has been independently visualized by me and  summarized below)      ECG 12 Lead    Date/Time: 5/25/2021 9:28 AM  Performed by: Reza Lopes MD  Authorized by: Reza Lopes MD   Comparison: compared with previous ECG   Similar to previous ECG  Rhythm: sinus rhythm and paced  Rate: normal  QRS axis: normal  Pacing: dual chamber pacing  Clinical impression: abnormal EKG             ASSESSMENT and PLAN    1.  Chronic systolic heart failure- Continue optimal rx with Coreg, Entresto, Aldactone and torsemide. Check Labs with increased CHF symptoms and medication changes. Check ECHO  2.  Biventricular ICD - [employer] - Post Gen Change in March 2019   3.  Atrial Fibrillation - [place] has greatly increased - As a result Amiodarone was restarted. Because of bleeding in the past has Eliquis 2.5mg BID had been stopped. He now has restarted 5mg BID.  Stable rates and symptoms during A. Fib  4.  Use of Amio -Monitor for signs of toxicity  5.  CAD- recent NACHO to mid left circumflex 2/2020. Noted to have significant CAD unamendable to revascularization. Continue Effient, statin. Dr. Guevara/Patrick are addressing  6.  Chronic Kidney disease - Monitor labs with medication changes.       Return in about 3 months (around 8/25/2021) for office visit and device check with [employer].    1. ECHO  2. Labs - CMP, TSH, fT4, BNP  3. Follow up in CHF Clinic in1 week.     Reza Lopes M.D., F.A.C.C, F.H.R.S.  Cardiology/Electrophysiology  5/25/2021  09:30 EDT

## 2021-05-26 LAB
ALBUMIN SERPL-MCNC: 4.4 G/DL (ref 3.8–4.8)
ALBUMIN/GLOB SERPL: 2.1 {RATIO} (ref 1.2–2.2)
ALP SERPL-CCNC: 107 IU/L (ref 48–121)
ALT SERPL-CCNC: 11 IU/L (ref 0–44)
AST SERPL-CCNC: 13 IU/L (ref 0–40)
BILIRUB SERPL-MCNC: 0.8 MG/DL (ref 0–1.2)
BUN SERPL-MCNC: 22 MG/DL (ref 8–27)
BUN/CREAT SERPL: 13 (ref 10–24)
CALCIUM SERPL-MCNC: 9.1 MG/DL (ref 8.6–10.2)
CHLORIDE SERPL-SCNC: 98 MMOL/L (ref 96–106)
CO2 SERPL-SCNC: 21 MMOL/L (ref 20–29)
CREAT SERPL-MCNC: 1.76 MG/DL (ref 0.76–1.27)
GLOBULIN SER CALC-MCNC: 2.1 G/DL (ref 1.5–4.5)
GLUCOSE SERPL-MCNC: 105 MG/DL (ref 65–99)
POTASSIUM SERPL-SCNC: 4.5 MMOL/L (ref 3.5–5.2)
PROT SERPL-MCNC: 6.5 G/DL (ref 6–8.5)
SODIUM SERPL-SCNC: 136 MMOL/L (ref 134–144)
T4 FREE SERPL-MCNC: 1.65 NG/DL (ref 0.82–1.77)
TSH SERPL DL<=0.005 MIU/L-ACNC: 3.12 UIU/ML (ref 0.45–4.5)

## 2021-05-27 ENCOUNTER — HOSPITAL ENCOUNTER (OUTPATIENT)
Dept: CARDIOLOGY | Facility: HOSPITAL | Age: 66
Discharge: HOME | End: 2021-05-27
Admitting: INTERNAL MEDICINE

## 2021-05-27 ENCOUNTER — APPOINTMENT (OUTPATIENT)
Dept: CARDIOLOGY | Facility: HOSPITAL | Age: 66
End: 2021-05-27

## 2021-05-27 LAB
BH CV ECHO MEAS - % IVS THICK: 59.7 %
BH CV ECHO MEAS - % LVPW THICK: 7 %
BH CV ECHO MEAS - ACS: 2.1 CM
BH CV ECHO MEAS - AI DEC SLOPE: 124 CM/SEC^2
BH CV ECHO MEAS - AI MAX PG: 43.8 MMHG
BH CV ECHO MEAS - AI MAX VEL: 331 CM/SEC
BH CV ECHO MEAS - AI P1/2T: 781.8 MSEC
BH CV ECHO MEAS - AO MAX PG: 4.7 MMHG
BH CV ECHO MEAS - AO MEAN PG: 3 MMHG
BH CV ECHO MEAS - AO ROOT AREA (BSA CORRECTED): 1.8
BH CV ECHO MEAS - AO ROOT AREA: 9.1 CM^2
BH CV ECHO MEAS - AO ROOT DIAM: 3.4 CM
BH CV ECHO MEAS - AO V2 MAX: 108 CM/SEC
BH CV ECHO MEAS - AO V2 MEAN: 78.5 CM/SEC
BH CV ECHO MEAS - AO V2 VTI: 20.6 CM
BH CV ECHO MEAS - BSA(HAYCOCK): 1.9 M^2
BH CV ECHO MEAS - BSA: 1.9 M^2
BH CV ECHO MEAS - BZI_BMI: 23.1 KILOGRAMS/M^2
BH CV ECHO MEAS - BZI_METRIC_HEIGHT: 177.8 CM
BH CV ECHO MEAS - BZI_METRIC_WEIGHT: 73 KG
BH CV ECHO MEAS - EDV(CUBED): 507.2 ML
BH CV ECHO MEAS - EDV(MOD-SP4): 273 ML
BH CV ECHO MEAS - EDV(TEICH): 342.2 ML
BH CV ECHO MEAS - EF(CUBED): 38.4 %
BH CV ECHO MEAS - EF(MOD-SP4): 22.7 %
BH CV ECHO MEAS - EF(TEICH): 30.4 %
BH CV ECHO MEAS - ESV(CUBED): 312.4 ML
BH CV ECHO MEAS - ESV(MOD-SP4): 211 ML
BH CV ECHO MEAS - ESV(TEICH): 238.1 ML
BH CV ECHO MEAS - FS: 14.9 %
BH CV ECHO MEAS - IVS/LVPW: 0.8
BH CV ECHO MEAS - IVSD: 0.75 CM
BH CV ECHO MEAS - IVSS: 1.2 CM
BH CV ECHO MEAS - LA DIMENSION: 4.7 CM
BH CV ECHO MEAS - LA/AO: 1.4
BH CV ECHO MEAS - LV DIASTOLIC VOL/BSA (35-75): 143.4 ML/M^2
BH CV ECHO MEAS - LV MASS(C)D: 330.9 GRAMS
BH CV ECHO MEAS - LV MASS(C)DI: 173.8 GRAMS/M^2
BH CV ECHO MEAS - LV MASS(C)S: 346.2 GRAMS
BH CV ECHO MEAS - LV MASS(C)SI: 181.9 GRAMS/M^2
BH CV ECHO MEAS - LV SYSTOLIC VOL/BSA (12-30): 110.8 ML/M^2
BH CV ECHO MEAS - LVIDD: 8 CM
BH CV ECHO MEAS - LVIDS: 6.8 CM
BH CV ECHO MEAS - LVLD AP4: 10.9 CM
BH CV ECHO MEAS - LVLS AP4: 10.7 CM
BH CV ECHO MEAS - LVOT AREA (M): 3.5 CM^2
BH CV ECHO MEAS - LVOT AREA: 3.5 CM^2
BH CV ECHO MEAS - LVOT DIAM: 2.1 CM
BH CV ECHO MEAS - LVPWD: 0.94 CM
BH CV ECHO MEAS - LVPWS: 1 CM
BH CV ECHO MEAS - MV A MAX VEL: 56.8 CM/SEC
BH CV ECHO MEAS - MV E MAX VEL: 124 CM/SEC
BH CV ECHO MEAS - MV E/A: 2.2
BH CV ECHO MEAS - PA ACC TIME: 0.06 SEC
BH CV ECHO MEAS - PA PR(ACCEL): 50.7 MMHG
BH CV ECHO MEAS - RAP SYSTOLE: 10 MMHG
BH CV ECHO MEAS - RVSP: 35 MMHG
BH CV ECHO MEAS - SI(AO): 98.3 ML/M^2
BH CV ECHO MEAS - SI(CUBED): 102.4 ML/M^2
BH CV ECHO MEAS - SI(MOD-SP4): 32.6 ML/M^2
BH CV ECHO MEAS - SI(TEICH): 54.7 ML/M^2
BH CV ECHO MEAS - SV(AO): 187 ML
BH CV ECHO MEAS - SV(CUBED): 194.9 ML
BH CV ECHO MEAS - SV(MOD-SP4): 62 ML
BH CV ECHO MEAS - SV(TEICH): 104.2 ML
BH CV ECHO MEAS - TR MAX VEL: 250 CM/SEC
MAXIMAL PREDICTED HEART RATE: 154 BPM
STRESS TARGET HR: 131 BPM

## 2021-05-27 PROCEDURE — 93306 TTE W/DOPPLER COMPLETE: CPT | Performed by: INTERNAL MEDICINE

## 2021-05-27 PROCEDURE — 93306 TTE W/DOPPLER COMPLETE: CPT

## 2021-06-01 RX ORDER — CARVEDILOL 6.25 MG/1
6.25 TABLET ORAL 2 TIMES DAILY WITH MEALS
Qty: 180 TABLET | Refills: 3 | Status: SHIPPED | OUTPATIENT
Start: 2021-06-01 | End: 2022-05-02

## 2021-06-02 NOTE — PROGRESS NOTES
"[facility], [facility]    Chief Complaint  Follow-up (Heart failure)    Subjective    History of Present Illness {CC  Problem List  Visit  Diagnosis   Encounters  Notes  Medications  Labs  Result Review Imaging  Media :23}     Reza Mcclelland presents to [facility] for   History of Present Illness   Is a 66-year-old male with ICM/CAD (Stent 202o), chronic heart failure with reduced EF with biventricular ICD, PVD, paroxysmal atrial fibrillation (increased Afib burden on amio), chronic kidney disease stage III, COPD, carotid stenosis.    Patient with recurrent atrial fibrillation on amio.   Farxiga was discontinued, started torsemide.   Pt has noted increased dyspnea, edema.  Patient continued on carvedilol, Entresto (daily), Aldactone.  Echocardiogram ordered with EF 21 to 25%, moderate MR, mild to moderate AR.    Eliquis had been stopped in the past due to bleeding.  Eliquis restarted.    Patient tells me edema improved significantly after starting torsemide.  PND and orthopnea improved.  Stated he received a phone call reporting worsening kidney function was told to stop the torsemide.  He had not taken torsemide in the last 3 days with worsening PND and orthopnea.  States he has not slept for 3 days having to set up and breathe.  Patient with intermittent mild left-sided chest discomfort in which she will take a nitroglycerin.  States he will take nitroglycerin 3-4 times per week.  Last heart catheterization 2020 with stent placement.        Objective     Vital Signs:   Vitals:    06/03/21 1058   BP: 101/60   BP Location: Left arm   Patient Position: Sitting   Cuff Size: Adult   Pulse: 76   Resp: 18   Temp: 97.5 °F (36.4 °C)   TempSrc: Temporal   SpO2: 97%   Weight: 73.3 kg (161 lb 8 oz)   Height: 177.8 cm (70\")     Body mass index is 23.17 kg/m².  Physical Exam  Vitals reviewed.   Constitutional:       General: He is not in " acute distress.     Appearance: He is ill-appearing.   Cardiovascular:      Rate and Rhythm: Normal rate and regular rhythm.      Pulses:           Radial pulses are 2+ on the right side.        Dorsalis pedis pulses are 2+ on the right side.        Posterior tibial pulses are 2+ on the right side.      Heart sounds: Normal heart sounds.   Pulmonary:      Effort: Pulmonary effort is normal.      Breath sounds: Rales ( Basis) present.   Abdominal:      Palpations: Abdomen is soft.      Tenderness: There is no abdominal tenderness.   Musculoskeletal:      Right lower leg: Edema ( 1+ bilaterally) present.      Left lower leg: Edema present.   Skin:     General: Skin is warm and dry.   Neurological:      Mental Status: He is alert.   Psychiatric:         Mood and Affect: Mood normal.         Behavior: Behavior is cooperative.              Result Review  Data Reviewed:{ Labs  Result Review  Imaging  Med Tab  Media :23}   .  Lab on 05/25/2021   Component Date Value Ref Range Status   • Free T4 05/25/2021 1.65  0.82 - 1.77 ng/dL Final   • TSH 05/25/2021 3.120  0.450 - 4.500 uIU/mL Final   • Glucose 05/25/2021 105* 65 - 99 mg/dL Final   • BUN 05/25/2021 22  8 - 27 mg/dL Final   • Creatinine 05/25/2021 1.76* 0.76 - 1.27 mg/dL Final   • eGFR Non  Am 05/25/2021 39* >59 mL/min/1.73 Final   • eGFR African Am 05/25/2021 46* >59 mL/min/1.73 Final    Comment: **[facility] currently reports eGFR in compliance with the current**    recommendations of the [employer] Kid[employer] Foundation. [facility] will    update reporting as new guidelines are published from the NKF-ASN    Task force.     • BUN/Creatinine Ratio 05/25/2021 13  10 - 24 Final   • Sodium 05/25/2021 136  134 - 144 mmol/L Final   • Potassium 05/25/2021 4.5  3.5 - 5.2 mmol/L Final   • Chloride 05/25/2021 98  96 - 106 mmol/L Final   • Total CO2 05/25/2021 21  20 - 29 mmol/L Final   • Calcium 05/25/2021 9.1  8.6 - 10.2 mg/dL Final   • Total Protein 05/25/2021 6.5  6.0 - 8.5  g/dL Final   • Albumin 05/25/2021 4.4  3.8 - 4.8 g/dL Final   • Globulin 05/25/2021 2.1  1.5 - 4.5 g/dL Final   • A/G Ratio 05/25/2021 2.1  1.2 - 2.2 Final   • Total Bilirubin 05/25/2021 0.8  0.0 - 1.2 mg/dL Final   • Alkaline Phosphatase 05/25/2021 107  48 - 121 IU/L Final                  **Please note reference interval change**   • AST (SGOT) 05/25/2021 13  0 - 40 IU/L Final   • ALT (SGPT) 05/25/2021 11  0 - 44 IU/L Final     Echo 5/27/2021: EF 21 to 25%, mild to moderate AR, moderate MR  Assessment and Plan {CC Problem List  Visit Diagnosis  ROS  Review (Popup)  Health Maintenance  Quality  BestPractice  Medications  SmartSets  SnapShot Encounters  Media :23}   1. Chronic systolic congestive heart failure (CMS/[facility])  EF 20 to 21%  NYHA class III-4  BIV ICD, nonresponder    Carvedilol, Entresto once a day, Aldactone.  Currently on max tolerated doses due to hypotension and kidney function    Patient instructed to restart torsemide 20 mg daily    Offered IV diuretics in clinic today due to worsening symptoms over the last 3 days.  Patient declined      - Comprehensive Metabolic Panel  - proBNP    Discussed goals of care, progression of heart failure.    2. PAF (paroxysmal atrial fibrillation) (CMS/HCC)  Amiodarone, Eliquis      3. Ischemic cardiomyopathy  Nitroglycerin as needed for angina.     4. Stage 3b chronic kidney disease (CMS/[facility])  Baseline creatinine since 2018 appears to be 1.6    - Comprehensive Metabolic Panel  - proBNP    5. Essential hypertension  Low normal, no hypotension.  No dizziness, near syncope, syncope.          Follow Up {Instructions Charge Capture  Follow-up Communications :23}   Return in about 4 weeks (around 7/1/2021) for Office visit, HF.    Patient was given instructions and counseling regarding his condition or for health maintenance advice. Please see specific information pulled into the AVS if appropriate.  Patient was instructed to call the [facility]  with any questions, concerns, or worsening symptoms.    *Please note that portions of this note were completed with a voice recognition program. Efforts were made to edit the dictations, but occasionally words are mistranscribed.

## 2021-06-03 ENCOUNTER — OFFICE VISIT (OUTPATIENT)
Dept: CARDIOLOGY | Facility: HOSPITAL | Age: 66
End: 2021-06-03

## 2021-06-03 ENCOUNTER — TELEPHONE (OUTPATIENT)
Dept: CARDIOLOGY | Facility: HOSPITAL | Age: 66
End: 2021-06-03

## 2021-06-03 VITALS
SYSTOLIC BLOOD PRESSURE: 101 MMHG | DIASTOLIC BLOOD PRESSURE: 60 MMHG | RESPIRATION RATE: 18 BRPM | WEIGHT: 161.5 LBS | OXYGEN SATURATION: 97 % | HEIGHT: 70 IN | HEART RATE: 76 BPM | BODY MASS INDEX: 23.12 KG/M2 | TEMPERATURE: 97.5 F

## 2021-06-03 DIAGNOSIS — I10 ESSENTIAL HYPERTENSION: ICD-10-CM

## 2021-06-03 DIAGNOSIS — I25.709 CORONARY ARTERY DISEASE INVOLVING CORONARY BYPASS GRAFT OF NATIVE HEART WITH ANGINA PECTORIS (HCC): Primary | ICD-10-CM

## 2021-06-03 DIAGNOSIS — I25.5 ISCHEMIC CARDIOMYOPATHY: ICD-10-CM

## 2021-06-03 DIAGNOSIS — N18.32 STAGE 3B CHRONIC KIDNEY DISEASE (HCC): ICD-10-CM

## 2021-06-03 DIAGNOSIS — I48.0 PAF (PAROXYSMAL ATRIAL FIBRILLATION) (HCC): ICD-10-CM

## 2021-06-03 DIAGNOSIS — I50.22 CHRONIC SYSTOLIC CONGESTIVE HEART FAILURE (HCC): Primary | ICD-10-CM

## 2021-06-03 LAB
ALBUMIN SERPL-MCNC: 4.4 G/DL (ref 3.5–5.2)
ALBUMIN/GLOB SERPL: 1.7 G/DL
ALP SERPL-CCNC: 112 U/L (ref 39–117)
ALT SERPL W P-5'-P-CCNC: 23 U/L (ref 1–41)
ANION GAP SERPL CALCULATED.3IONS-SCNC: 11.2 MMOL/L (ref 5–15)
AST SERPL-CCNC: 17 U/L (ref 1–40)
BILIRUB SERPL-MCNC: 0.9 MG/DL (ref 0–1.2)
BUN SERPL-MCNC: 35 MG/DL (ref 8–23)
BUN/CREAT SERPL: 19.7 (ref 7–25)
CALCIUM SPEC-SCNC: 9.3 MG/DL (ref 8.6–10.5)
CHLORIDE SERPL-SCNC: 100 MMOL/L (ref 98–107)
CO2 SERPL-SCNC: 24.8 MMOL/L (ref 22–29)
CREAT SERPL-MCNC: 1.78 MG/DL (ref 0.76–1.27)
GFR SERPL CREATININE-BSD FRML MDRD: 38 ML/MIN/1.73
GLOBULIN UR ELPH-MCNC: 2.6 GM/DL
GLUCOSE SERPL-MCNC: 129 MG/DL (ref 65–99)
NT-PROBNP SERPL-MCNC: ABNORMAL PG/ML (ref 0–900)
POTASSIUM SERPL-SCNC: 4.4 MMOL/L (ref 3.5–5.2)
PROT SERPL-MCNC: 7 G/DL (ref 6–8.5)
SODIUM SERPL-SCNC: 136 MMOL/L (ref 136–145)

## 2021-06-03 PROCEDURE — 83880 ASSAY OF NATRIURETIC PEPTIDE: CPT | Performed by: NURSE PRACTITIONER

## 2021-06-03 PROCEDURE — 99214 OFFICE O/P EST MOD 30 MIN: CPT | Performed by: NURSE PRACTITIONER

## 2021-06-03 PROCEDURE — 80053 COMPREHEN METABOLIC PANEL: CPT | Performed by: NURSE PRACTITIONER

## 2021-06-03 RX ORDER — RANOLAZINE 500 MG/1
500 TABLET, EXTENDED RELEASE ORAL 2 TIMES DAILY
Qty: 60 TABLET | Refills: 3 | Status: SHIPPED | OUTPATIENT
Start: 2021-06-03 | End: 2021-10-17

## 2021-06-03 NOTE — TELEPHONE ENCOUNTER
I have reviewed patient's lab results.  Lab results stable.  I want him to take torsemide 20 mg twice a day for 3 days, then resume 20 mg daily.  Please let us know if symptoms do not improve or they worsen.      Reviewed symptoms with Dr. Guevara.  We will start medicine called Ranexa to take twice a day.  Looking to see if this helps his chest pain.    Dr. Bates would like to have a repeat stress test.  We will contact him for scheduling.

## 2021-06-08 RX ORDER — ALBUTEROL SULFATE 90 UG/1
2 AEROSOL, METERED RESPIRATORY (INHALATION) EVERY 6 HOURS PRN
Qty: 6.7 G | Refills: 0 | Status: SHIPPED | OUTPATIENT
Start: 2021-06-08 | End: 2021-08-13

## 2021-06-08 NOTE — TELEPHONE ENCOUNTER
Called and advised patient that Isis Hawthorne reviewed his labs last week and they are stable. Advised patient to take torsemide 20mg BID for 3 days then return to 20mg daily. Patient verbalized understanding. Patient stated Isis DUNCAN called him in a new prescription for Ranolazine and he is unsure what this is. Advised patient he is to take Ranolazine 500mg BID, further explained to patein that this drug was an Antianginal which will help with the angina. Patient verbalized understanding and had no further questions at this time.

## 2021-06-10 ENCOUNTER — HOSPITAL ENCOUNTER (OUTPATIENT)
Dept: CARDIOLOGY | Facility: HOSPITAL | Age: 66
Discharge: HOME | End: 2021-06-10
Admitting: NURSE PRACTITIONER

## 2021-06-10 VITALS
HEART RATE: 76 BPM | DIASTOLIC BLOOD PRESSURE: 58 MMHG | BODY MASS INDEX: 23.13 KG/M2 | WEIGHT: 161.6 LBS | HEIGHT: 70 IN | SYSTOLIC BLOOD PRESSURE: 92 MMHG

## 2021-06-10 DIAGNOSIS — I25.709 CORONARY ARTERY DISEASE INVOLVING CORONARY BYPASS GRAFT OF NATIVE HEART WITH ANGINA PECTORIS (HCC): ICD-10-CM

## 2021-06-10 LAB
BH CV REST NUCLEAR ISOTOPE DOSE: 29.9 MCI
BH CV STRESS BP STAGE 2: NORMAL
BH CV STRESS BP STAGE 4: NORMAL
BH CV STRESS COMMENTS STAGE 1: NORMAL
BH CV STRESS DOSE REGADENOSON STAGE 1: 0.4
BH CV STRESS DURATION MIN STAGE 1: 1
BH CV STRESS DURATION MIN STAGE 2: 1
BH CV STRESS DURATION MIN STAGE 3: 1
BH CV STRESS DURATION MIN STAGE 4: 1
BH CV STRESS DURATION SEC STAGE 1: 0
BH CV STRESS DURATION SEC STAGE 2: 0
BH CV STRESS DURATION SEC STAGE 3: 0
BH CV STRESS DURATION SEC STAGE 4: 0
BH CV STRESS HR STAGE 1: 82
BH CV STRESS HR STAGE 2: 70
BH CV STRESS HR STAGE 3: 70
BH CV STRESS HR STAGE 4: 70
BH CV STRESS NUCLEAR ISOTOPE DOSE: 29.9 MCI
BH CV STRESS O2 STAGE 1: 90
BH CV STRESS O2 STAGE 2: 90
BH CV STRESS O2 STAGE 3: 92
BH CV STRESS O2 STAGE 4: 98
BH CV STRESS PROTOCOL 1: NORMAL
BH CV STRESS RECOVERY BP: NORMAL MMHG
BH CV STRESS RECOVERY HR: 70 BPM
BH CV STRESS RECOVERY O2: 97 %
BH CV STRESS STAGE 1: 1
BH CV STRESS STAGE 2: 2
BH CV STRESS STAGE 3: 3
BH CV STRESS STAGE 4: 4
LV EF NUC BP: 22 %
MAXIMAL PREDICTED HEART RATE: 154 BPM
PERCENT MAX PREDICTED HR: 53.25 %
STRESS BASELINE BP: NORMAL MMHG
STRESS BASELINE HR: 75 BPM
STRESS O2 SAT REST: 90 %
STRESS PERCENT HR: 63 %
STRESS POST ESTIMATED WORKLOAD: 1 METS
STRESS POST EXERCISE DUR MIN: 4 MIN
STRESS POST EXERCISE DUR SEC: 0 SEC
STRESS POST O2 SAT PEAK: 98 %
STRESS POST PEAK BP: NORMAL MMHG
STRESS POST PEAK HR: 82 BPM
STRESS TARGET HR: 131 BPM

## 2021-06-10 PROCEDURE — 93018 CV STRESS TEST I&R ONLY: CPT | Performed by: INTERNAL MEDICINE

## 2021-06-10 PROCEDURE — 78492 MYOCRD IMG PET MLT RST&STRS: CPT | Performed by: INTERNAL MEDICINE

## 2021-06-10 PROCEDURE — 93017 CV STRESS TEST TRACING ONLY: CPT

## 2021-06-10 PROCEDURE — 25010000002 REGADENOSON 0.4 MG/5ML SOLUTION: Performed by: NURSE PRACTITIONER

## 2021-06-10 PROCEDURE — 0 RUBIDIUM CHLORIDE: Performed by: NURSE PRACTITIONER

## 2021-06-10 PROCEDURE — 78492 MYOCRD IMG PET MLT RST&STRS: CPT

## 2021-06-10 PROCEDURE — A9555 RB82 RUBIDIUM: HCPCS | Performed by: NURSE PRACTITIONER

## 2021-06-10 RX ADMIN — REGADENOSON 0.4 MG: 0.08 INJECTION, SOLUTION INTRAVENOUS at 10:26

## 2021-06-10 RX ADMIN — RUBIDIUM CHLORIDE RB-82 1 DOSE: 150 INJECTION, SOLUTION INTRAVENOUS at 10:27

## 2021-06-10 RX ADMIN — RUBIDIUM CHLORIDE RB-82 1 DOSE: 150 INJECTION, SOLUTION INTRAVENOUS at 10:12

## 2021-06-14 ENCOUNTER — TELEPHONE (OUTPATIENT)
Dept: CARDIOLOGY | Facility: HOSPITAL | Age: 66
End: 2021-06-14

## 2021-06-15 NOTE — TELEPHONE ENCOUNTER
Called patient with stress test results. Stress test showed large sized infarct in the lateral wall, inferior apical wall and apex with no significant ischemia noted. Known history of distal LAD  and stent, ramus intermedius ostial  in-stent, RCA ostial . Advised him that there was no evidence of active ischemia. He tells me he has been having shortness of breath when he lays down for a long time and thought it might be a heart blockage. I explained to him that it could be fluid overload. He reports he is taking 40 of torsemide, which is helped some. However, he is very clear that he does not like being on all this medication and I am not sure if he is taking it regularly. I offered to get him an appointment to be seen for possible IV diuretics and he declines. He says he is actually feeling quite well today. He reports he has a CT scan tomorrow with his PCP. I advised him to call with any worsening symptoms

## 2021-06-21 ENCOUNTER — TELEPHONE (OUTPATIENT)
Dept: CARDIOLOGY | Facility: HOSPITAL | Age: 66
End: 2021-06-21

## 2021-06-21 NOTE — TELEPHONE ENCOUNTER
----- Message from Mirza Vargas sent at 6/17/2021  5:22 PM EDT -----  I just left him a voicemail, I just had a spot open up on June 30th that i'm going to try to get him in.  ----- Message -----  From: Isis Hawthorne APRN  Sent: 6/11/2021   7:27 AM EDT  To: Mirza Vargas    Pt of Dr. Guevara with recurrent CP hx of CAD.  Recently placed on antianginal meds.  We we have the option of moving Dr. Caceres appointment up?  He may want to evaluate if a LHC is needed. Any appointments in 4 weeks?

## 2021-06-21 NOTE — TELEPHONE ENCOUNTER
Please call patient and let him know that [facility] scheduling is trying to contact him in order to move his follow-up schedule up sooner with Dr. Guevara for evaluation.

## 2021-06-22 NOTE — TELEPHONE ENCOUNTER
Called and advised patient  has been attempting to contact him to move his follow up appointment with Dr. Guevara up, transferred patient to [facility].

## 2021-06-24 ENCOUNTER — HOSPITAL ENCOUNTER (EMERGENCY)
Facility: HOSPITAL | Age: 66
Discharge: HOME | End: 2021-06-24
Attending: FAMILY MEDICINE | Admitting: FAMILY MEDICINE

## 2021-06-24 ENCOUNTER — APPOINTMENT (OUTPATIENT)
Dept: CT IMAGING | Facility: HOSPITAL | Age: 66
End: 2021-06-24

## 2021-06-24 ENCOUNTER — APPOINTMENT (OUTPATIENT)
Dept: GENERAL RADIOLOGY | Facility: HOSPITAL | Age: 66
End: 2021-06-24

## 2021-06-24 VITALS
HEIGHT: 70 IN | TEMPERATURE: 99.2 F | DIASTOLIC BLOOD PRESSURE: 66 MMHG | BODY MASS INDEX: 22.9 KG/M2 | OXYGEN SATURATION: 97 % | RESPIRATION RATE: 16 BRPM | HEART RATE: 80 BPM | SYSTOLIC BLOOD PRESSURE: 93 MMHG | WEIGHT: 160 LBS
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
## 2021-06-24 DIAGNOSIS — S00.03XA SCALP HEMATOMA, INITIAL ENCOUNTER: ICD-10-CM

## 2021-06-24 LAB
ALBUMIN SERPL-MCNC: 3.51 G/DL (ref 3.5–5.2)
ALBUMIN/GLOB SERPL: 1.5 G/DL
ALP SERPL-CCNC: 209 U/L (ref 39–117)
ALT SERPL W P-5'-P-CCNC: 752 U/L (ref 1–41)
ANION GAP SERPL CALCULATED.3IONS-SCNC: 8.6 MMOL/L (ref 5–15)
APTT PPP: 38.6 SECONDS (ref 25.5–35.4)
AST SERPL-CCNC: 354 U/L (ref 1–40)
BASOPHILS # BLD AUTO: 0.08 10*3/MM3 (ref 0–0.2)
BASOPHILS NFR BLD AUTO: 1 % (ref 0–1.5)
BILIRUB SERPL-MCNC: 1.2 MG/DL (ref 0–1.2)
BUN SERPL-MCNC: 36 MG/DL (ref 8–23)
BUN/CREAT SERPL: 19.3 (ref 7–25)
CALCIUM SPEC-SCNC: 9 MG/DL (ref 8.6–10.5)
CHLORIDE SERPL-SCNC: 99 MMOL/L (ref 98–107)
CO2 SERPL-SCNC: 25.4 MMOL/L (ref 22–29)
CREAT SERPL-MCNC: 1.87 MG/DL (ref 0.76–1.27)
CRP SERPL-MCNC: 1.76 MG/DL (ref 0–0.5)
DEPRECATED RDW RBC AUTO: 52.5 FL (ref 37–54)
EOSINOPHIL # BLD AUTO: 0.13 10*3/MM3 (ref 0–0.4)
EOSINOPHIL NFR BLD AUTO: 1.5 % (ref 0.3–6.2)
ERYTHROCYTE [DISTWIDTH] IN BLOOD BY AUTOMATED COUNT: 16.4 % (ref 12.3–15.4)
GFR SERPL CREATININE-BSD FRML MDRD: 36 ML/MIN/1.73
GLOBULIN UR ELPH-MCNC: 2.4 GM/DL
GLUCOSE SERPL-MCNC: 128 MG/DL (ref 65–99)
HCT VFR BLD AUTO: 44.1 % (ref 37.5–51)
HGB BLD-MCNC: 14.4 G/DL (ref 13–17.7)
IMM GRANULOCYTES # BLD AUTO: 0.02 10*3/MM3 (ref 0–0.05)
IMM GRANULOCYTES NFR BLD AUTO: 0.2 % (ref 0–0.5)
INR PPP: 2.32 (ref 0.9–1.1)
LYMPHOCYTES # BLD AUTO: 1.12 10*3/MM3 (ref 0.7–3.1)
LYMPHOCYTES NFR BLD AUTO: 13.3 % (ref 19.6–45.3)
MAGNESIUM SERPL-MCNC: 2.3 MG/DL (ref 1.6–2.4)
MCH RBC QN AUTO: 28.7 PG (ref 26.6–33)
MCHC RBC AUTO-ENTMCNC: 32.7 G/DL (ref 31.5–35.7)
MCV RBC AUTO: 88 FL (ref 79–97)
MONOCYTES # BLD AUTO: 0.65 10*3/MM3 (ref 0.1–0.9)
MONOCYTES NFR BLD AUTO: 7.7 % (ref 5–12)
NEUTROPHILS NFR BLD AUTO: 6.42 10*3/MM3 (ref 1.7–7)
NEUTROPHILS NFR BLD AUTO: 76.3 % (ref 42.7–76)
NRBC BLD AUTO-RTO: 0 /100 WBC (ref 0–0.2)
NT-PROBNP SERPL-MCNC: 5723 PG/ML (ref 0–900)
PLATELET # BLD AUTO: 146 10*3/MM3 (ref 140–450)
PMV BLD AUTO: 11.4 FL (ref 6–12)
POTASSIUM SERPL-SCNC: 4.4 MMOL/L (ref 3.5–5.2)
PROT SERPL-MCNC: 5.9 G/DL (ref 6–8.5)
PROTHROMBIN TIME: 25.8 SECONDS (ref 12.8–14.5)
QT INTERVAL: 516 MS
QTC INTERVAL: 557 MS
RBC # BLD AUTO: 5.01 10*6/MM3 (ref 4.14–5.8)
SODIUM SERPL-SCNC: 133 MMOL/L (ref 136–145)
TROPONIN T SERPL-MCNC: <0.01 NG/ML (ref 0–0.03)
TSH SERPL DL<=0.05 MIU/L-ACNC: 2.45 UIU/ML (ref 0.27–4.2)
WBC # BLD AUTO: 8.42 10*3/MM3 (ref 3.4–10.8)

## 2021-06-24 PROCEDURE — 99284 EMERGENCY DEPT VISIT MOD MDM: CPT

## 2021-06-24 PROCEDURE — 70450 CT HEAD/BRAIN W/O DYE: CPT

## 2021-06-24 PROCEDURE — 84443 ASSAY THYROID STIM HORMONE: CPT | Performed by: FAMILY MEDICINE

## 2021-06-24 PROCEDURE — 83880 ASSAY OF NATRIURETIC PEPTIDE: CPT | Performed by: FAMILY MEDICINE

## 2021-06-24 PROCEDURE — 84484 ASSAY OF TROPONIN QUANT: CPT | Performed by: FAMILY MEDICINE

## 2021-06-24 PROCEDURE — 85730 THROMBOPLASTIN TIME PARTIAL: CPT | Performed by: FAMILY MEDICINE

## 2021-06-24 PROCEDURE — 71045 X-RAY EXAM CHEST 1 VIEW: CPT | Performed by: RADIOLOGY

## 2021-06-24 PROCEDURE — 93010 ELECTROCARDIOGRAM REPORT: CPT | Performed by: INTERNAL MEDICINE

## 2021-06-24 PROCEDURE — 83735 ASSAY OF MAGNESIUM: CPT | Performed by: FAMILY MEDICINE

## 2021-06-24 PROCEDURE — 93005 ELECTROCARDIOGRAM TRACING: CPT | Performed by: FAMILY MEDICINE

## 2021-06-24 PROCEDURE — 85610 PROTHROMBIN TIME: CPT | Performed by: FAMILY MEDICINE

## 2021-06-24 PROCEDURE — 71045 X-RAY EXAM CHEST 1 VIEW: CPT

## 2021-06-24 PROCEDURE — 80053 COMPREHEN METABOLIC PANEL: CPT | Performed by: FAMILY MEDICINE

## 2021-06-24 PROCEDURE — 86140 C-REACTIVE PROTEIN: CPT | Performed by: FAMILY MEDICINE

## 2021-06-24 PROCEDURE — 70450 CT HEAD/BRAIN W/O DYE: CPT | Performed by: RADIOLOGY

## 2021-06-24 PROCEDURE — 85025 COMPLETE CBC W/AUTO DIFF WBC: CPT | Performed by: FAMILY MEDICINE

## 2021-06-24 RX ORDER — SODIUM CHLORIDE 0.9 % (FLUSH) 0.9 %
10 SYRINGE (ML) INJECTION AS NEEDED
Status: DISCONTINUED | OUTPATIENT
Start: 2021-06-24 | End: 2021-06-24 | Stop reason: HOSPADM

## 2021-06-28 ENCOUNTER — OFFICE VISIT (OUTPATIENT)
Dept: CARDIOLOGY | Facility: CLINIC | Age: 66
End: 2021-06-28

## 2021-06-28 ENCOUNTER — TELEPHONE (OUTPATIENT)
Dept: CARDIOLOGY | Facility: CLINIC | Age: 66
End: 2021-06-28

## 2021-06-28 VITALS
WEIGHT: 168 LBS | HEART RATE: 88 BPM | DIASTOLIC BLOOD PRESSURE: 65 MMHG | SYSTOLIC BLOOD PRESSURE: 95 MMHG | HEIGHT: 70 IN | BODY MASS INDEX: 24.05 KG/M2

## 2021-06-28 DIAGNOSIS — I10 ESSENTIAL HYPERTENSION: ICD-10-CM

## 2021-06-28 DIAGNOSIS — I48.0 PAF (PAROXYSMAL ATRIAL FIBRILLATION) (HCC): ICD-10-CM

## 2021-06-28 DIAGNOSIS — I25.5 ISCHEMIC CARDIOMYOPATHY: Primary | ICD-10-CM

## 2021-06-28 DIAGNOSIS — E78.5 DYSLIPIDEMIA: ICD-10-CM

## 2021-06-28 DIAGNOSIS — I25.9 ISCHEMIC HEART DISEASE: ICD-10-CM

## 2021-06-28 PROCEDURE — 99214 OFFICE O/P EST MOD 30 MIN: CPT | Performed by: INTERNAL MEDICINE

## 2021-06-28 RX ORDER — MIDODRINE HYDROCHLORIDE 5 MG/1
5 TABLET ORAL
Qty: 270 TABLET | Refills: 3 | Status: SHIPPED | OUTPATIENT
Start: 2021-06-28 | End: 2022-07-22 | Stop reason: SDUPTHER

## 2021-06-28 NOTE — TELEPHONE ENCOUNTER
Filled out heart failure clinic form online to refer. Faxed records to [phone]. Form states we could call to confirm apt has been made at [phone].

## 2021-07-19 ENCOUNTER — TRANSCRIBE ORDERS (OUTPATIENT)
Dept: ADMINISTRATIVE | Facility: HOSPITAL | Age: 66
End: 2021-07-19

## 2021-07-19 DIAGNOSIS — Z01.818 OTHER SPECIFIED PRE-OPERATIVE EXAMINATION: Primary | ICD-10-CM

## 2021-08-10 ENCOUNTER — OFFICE VISIT (OUTPATIENT)
Dept: CARDIOLOGY | Facility: HOSPITAL | Age: 66
End: 2021-08-10

## 2021-08-10 ENCOUNTER — LAB (OUTPATIENT)
Dept: LAB | Facility: HOSPITAL | Age: 66
End: 2021-08-10

## 2021-08-10 VITALS
HEIGHT: 70 IN | SYSTOLIC BLOOD PRESSURE: 111 MMHG | WEIGHT: 161.38 LBS | OXYGEN SATURATION: 100 % | HEART RATE: 76 BPM | DIASTOLIC BLOOD PRESSURE: 69 MMHG | RESPIRATION RATE: 16 BRPM | TEMPERATURE: 97.8 F | BODY MASS INDEX: 23.1 KG/M2

## 2021-08-10 DIAGNOSIS — I95.9 HYPOTENSION, UNSPECIFIED HYPOTENSION TYPE: ICD-10-CM

## 2021-08-10 DIAGNOSIS — I50.22 CHRONIC SYSTOLIC CONGESTIVE HEART FAILURE (HCC): ICD-10-CM

## 2021-08-10 DIAGNOSIS — I48.0 PAF (PAROXYSMAL ATRIAL FIBRILLATION) (HCC): ICD-10-CM

## 2021-08-10 DIAGNOSIS — I25.5 ISCHEMIC CARDIOMYOPATHY: ICD-10-CM

## 2021-08-10 DIAGNOSIS — I50.22 CHRONIC SYSTOLIC CONGESTIVE HEART FAILURE (HCC): Primary | ICD-10-CM

## 2021-08-10 LAB
ANION GAP SERPL CALCULATED.3IONS-SCNC: 11.5 MMOL/L (ref 5–15)
BUN SERPL-MCNC: 19 MG/DL (ref 8–23)
BUN/CREAT SERPL: 11.8 (ref 7–25)
CALCIUM SPEC-SCNC: 9.6 MG/DL (ref 8.6–10.5)
CHLORIDE SERPL-SCNC: 98 MMOL/L (ref 98–107)
CO2 SERPL-SCNC: 25.5 MMOL/L (ref 22–29)
CREAT SERPL-MCNC: 1.61 MG/DL (ref 0.76–1.27)
GFR SERPL CREATININE-BSD FRML MDRD: 43 ML/MIN/1.73
GLUCOSE SERPL-MCNC: 106 MG/DL (ref 65–99)
POTASSIUM SERPL-SCNC: 5 MMOL/L (ref 3.5–5.2)
SODIUM SERPL-SCNC: 135 MMOL/L (ref 136–145)

## 2021-08-10 PROCEDURE — 80048 BASIC METABOLIC PNL TOTAL CA: CPT

## 2021-08-10 PROCEDURE — 99214 OFFICE O/P EST MOD 30 MIN: CPT | Performed by: NURSE PRACTITIONER

## 2021-08-10 PROCEDURE — 36415 COLL VENOUS BLD VENIPUNCTURE: CPT

## 2021-08-10 NOTE — PROGRESS NOTES
"[facility], [facility] Heart and Vascular    Chief Complaint  Follow-up and Congestive Heart Failure    Subjective    History of Present Illness {  Problem List  Visit  Diagnosis   Encounters  Notes  Medications  Labs  Result Review Imaging  Media :23}     Reza Mcclelland presents to [facility] CARDIOLOGY for   History of Present Illness     66-year-old male with ischemic cardiomyopathy status post stents 2020, chronic heart failure with reduced EF status post biventricular ICD, PVD, paroxysmal atrial fibrillation, chronic kidney disease stage III, COPD, carotid stenosis.  Echocardiogram    With EF 21 to 25%, moderate MR, mild to moderate AR.  Patient currently on carvedilol, Entresto (daily), Aldactone, torsemide.  Patient with recurrent atrial fibrillation on amiodarone, status post ECV April 2021, and Eliquis.    Patient takes nitroglycerin as needed for chest discomfort.  Patient with recent fall.  Reported hypotension.  Started on midodrine and nocturnal oxygen.  Patient with worsening heart failure symptoms.  Referred to the heart failure clinic at [facility].    Pt reports going to  and not interested in heart transplant or LVAD.  He wants to continue with current medical treatement.    Pt is doing much better on midodrine.  Less fatigue.  More active.  Dyspnea improved.  Pt states he feels better.  No CP, pressure, edema, PND, orthopnea.     Objective     Vital Signs:   Vitals:    08/10/21 1105   BP: 111/69   BP Location: Left arm   Patient Position: Sitting   Cuff Size: Adult   Pulse: 76   Resp: 16   Temp: 97.8 °F (36.6 °C)   TempSrc: Temporal   SpO2: 100%   Weight: 73.2 kg (161 lb 6 oz)   Height: 177.8 cm (70\")     Body mass index is 23.15 kg/m².  Physical Exam  Constitutional:       General: He is not in acute distress.     Appearance: Normal appearance.   Cardiovascular:      Rate and Rhythm: Normal rate and regular rhythm.      Pulses: "           Radial pulses are 2+ on the right side.        Dorsalis pedis pulses are 2+ on the right side.        Posterior tibial pulses are 2+ on the right side.      Heart sounds: Normal heart sounds.   Pulmonary:      Effort: Pulmonary effort is normal.      Breath sounds: Normal breath sounds.   Abdominal:      Palpations: Abdomen is soft.      Tenderness: There is no abdominal tenderness.   Musculoskeletal:      Right lower leg: No edema.      Left lower leg: No edema.   Skin:     General: Skin is warm and dry.   Neurological:      Mental Status: He is alert.   Psychiatric:         Mood and Affect: Mood normal.         Behavior: Behavior is cooperative.              Result Review  Data Reviewed:{ Labs  Result Review  Imaging  Med Tab  Media :23}     Lab Results   Component Value Date    WBC 8.42 06/24/2021    HGB 14.4 06/24/2021    HCT 44.1 06/24/2021    MCV 88.0 06/24/2021     06/24/2021     Lab Results   Component Value Date    GLUCOSE 128 (H) 06/24/2021    CALCIUM 9.0 06/24/2021     (L) 06/24/2021    K 4.4 06/24/2021    CO2 25.4 06/24/2021    CL 99 06/24/2021    BUN 36 (H) 06/24/2021    CREATININE 1.87 (H) 06/24/2021    EGFRIFAFRI 46 (L) 05/25/2021    EGFRIFNONA 36 (L) 06/24/2021    BCR 19.3 06/24/2021    ANIONGAP 8.6 06/24/2021     Lab Results   Component Value Date    TSH 2.450 06/24/2021       Assessment and Plan {CC Problem List  Visit Diagnosis  ROS  Review (Popup)  Health Maintenance  Quality  BestPractice  Medications  SmartSets  SnapShot Encounters  Media :23}   1. Chronic systolic congestive heart failure (CMS/HCC)  Ef 20-21%  BIV ICD.  Not interested in LVAD/heart transplant (visited [facility])    Continue torsemide, enstresto. Aldactone.     Improved s/s with treatment of hypotension  - Basic Metabolic Panel; Future    2. Ischemic cardiomyopathy  NTG PRN.  Using less frequent.     3. PAF (paroxysmal atrial fibrillation) (CMS/HCC)  HR controlled  amio and eliquis    4.  Hypotension, unspecified hypotension type  Improved activity tolerance with use of midodrine.      F/u with [facility] as scheduled.  F/u [facility] 3 months (after [facility] f/u)  or sooner if needed.      Follow Up {Instructions Charge Capture  Follow-up Communications :23}   Return in about 4 months (around 12/10/2021) for Office visit.    Patient was given instructions and counseling regarding his condition or for health maintenance advice. Please see specific information pulled into the AVS if appropriate.  Patient was instructed to call the [facility] with any questions, concerns, or worsening symptoms.    *Please note that portions of this note were completed with a voice recognition program. Efforts were made to edit the dictations, but occasionally words are mistranscribed.

## 2021-08-11 ENCOUNTER — TELEPHONE (OUTPATIENT)
Dept: CARDIOLOGY | Facility: HOSPITAL | Age: 66
End: 2021-08-11

## 2021-08-13 RX ORDER — ALBUTEROL SULFATE 90 UG/1
2 AEROSOL, METERED RESPIRATORY (INHALATION) EVERY 6 HOURS PRN
Qty: 18 G | Refills: 1 | Status: SHIPPED | OUTPATIENT
Start: 2021-08-13 | End: 2021-10-12 | Stop reason: SDUPTHER

## 2021-08-13 RX ORDER — ALBUTEROL SULFATE 90 UG/1
AEROSOL, METERED RESPIRATORY (INHALATION)
Qty: 18 G | Refills: 1 | Status: SHIPPED | OUTPATIENT
Start: 2021-08-13 | End: 2021-08-13 | Stop reason: SDUPTHER

## 2021-08-24 PROCEDURE — 93295 DEV INTERROG REMOTE 1/2/MLT: CPT | Performed by: STUDENT IN AN ORGANIZED HEALTH CARE EDUCATION/TRAINING PROGRAM

## 2021-08-24 PROCEDURE — 93296 REM INTERROG EVL PM/IDS: CPT | Performed by: STUDENT IN AN ORGANIZED HEALTH CARE EDUCATION/TRAINING PROGRAM

## 2021-08-26 RX ORDER — ATORVASTATIN CALCIUM 80 MG/1
80 TABLET, FILM COATED ORAL
Qty: 90 TABLET | Refills: 3 | Status: SHIPPED | OUTPATIENT
Start: 2021-08-26 | End: 2022-08-30 | Stop reason: SDUPTHER

## 2021-08-26 RX ORDER — SACUBITRIL AND VALSARTAN 24; 26 MG/1; MG/1
TABLET, FILM COATED ORAL
Qty: 180 TABLET | Refills: 3 | Status: SHIPPED | OUTPATIENT
Start: 2021-08-26 | End: 2022-08-15

## 2021-09-03 ENCOUNTER — OFFICE VISIT (OUTPATIENT)
Dept: CARDIOLOGY | Facility: CLINIC | Age: 66
End: 2021-09-03

## 2021-09-03 VITALS
HEART RATE: 74 BPM | HEIGHT: 70 IN | SYSTOLIC BLOOD PRESSURE: 101 MMHG | DIASTOLIC BLOOD PRESSURE: 65 MMHG | BODY MASS INDEX: 23.62 KG/M2 | WEIGHT: 165 LBS | OXYGEN SATURATION: 99 %

## 2021-09-03 DIAGNOSIS — I10 ESSENTIAL HYPERTENSION: ICD-10-CM

## 2021-09-03 DIAGNOSIS — I48.0 PAF (PAROXYSMAL ATRIAL FIBRILLATION) (HCC): ICD-10-CM

## 2021-09-03 DIAGNOSIS — I25.9 ISCHEMIC HEART DISEASE: ICD-10-CM

## 2021-09-03 DIAGNOSIS — E78.5 DYSLIPIDEMIA: ICD-10-CM

## 2021-09-03 DIAGNOSIS — I25.5 ISCHEMIC CARDIOMYOPATHY: Primary | ICD-10-CM

## 2021-09-03 PROCEDURE — 99214 OFFICE O/P EST MOD 30 MIN: CPT | Performed by: INTERNAL MEDICINE

## 2021-09-03 NOTE — PROGRESS NOTES
Subjective:     Encounter Date:09/03/2021    Patient ID: Reza Mcclelland is a 66 y.o. single white male, retired [employer] , from [place], [place].     PHYSICIAN: Rolanda Billingsley MD/EMILY Romo  NEUROLOGIST: Jonas Quiroz MD   CARDIOVASCULAR SURGEON: Guillaume Pruitt MD, [facility]  INTERVENTIONAL CARDIOLOGIST: Ismael Nguyen MD, [facility]/ Oliverio Lemus MD, [facility], [facility]/Guillaume Shore MD, [facility]  FORMER ELECTROPHYSIOLOGIST: Reza Lopes MD, [facility], [facility]: DAKOTA Peters  ENDOCRINOLOGIST: unknown  [facility] ADVANCED HEART FAILURE CARDIOLOGIST: Yuridia Mock MD    Chief Complaint:   Chief Complaint   Patient presents with   • Cardiomyopathy       Problem List:  1. Ischemic heart disease/ischemic cardiomyopathy:  a. Remote non-ST-elevation myocardial infarction with emergent left heart catheterization and successful percutaneous transluminal coronary angioplasty and stenting of the ramus intermedius with a 3.0 x 12 mm Xience drug-eluting stent, 11/19/2008.  b. Left heart catheterization revealing severe stenosis in the LAD of about 80% to 90% in the mid portion with 2.5 x 28 mm, 3.0 x 28 mm, and 3.5 x 18 mm Xience drug-eluting stents in the mid and proximal portion of the LAD, critical left anterior descending stenosis with recommendations to return in 2 weeks for further intervention, 12/04/2008.  c. Left heart catheterization revealing critical right coronary artery stenosis with 3.5 x 28 mm Xience drug-eluting stent deployed in the mid RCA and Xience drug-eluting stent deployed in the proximal RCA, also revealing severe peripheral vascular disease with critical bilateral stenosis of common iliac artery with recommendations for the patient to remain on Plavix and aspirin and further evaluation of iliac stenosis, 12/18/2008.  d. Recurrent CCS class II-III angina with subsequent left heart catheterization and stenting with a 3.5 x 15  mm Xience drug-eluting stent to the proximal LAD and 2.5 x 18 mm Xience drug-eluting stent into the distal LAD with widely patent stents in the RCA, 90% lesion in the stent of the LAD and widely patent stent of the diagonal branch. LVEF (0.65) with no wall motion abnormalities. Severe peripheral arterial disease with 60% stenosis of the left common iliac artery and 80% to 90% stenosis in the distal common femoral artery with recommendations for abdominal aortogram with right and left lower extremity arteriogram, 01/21/2011.  e. Cardiac catheterization with percutaneous transluminal angioplasty and self-expanding stent placement to the proximal innominate artery, distal embolic protection filter placement in the right carotid artery for cerebral protection during procedure, 10 x 20 mm Xact self-expanding stent deployed to the proximal innominate stenosis, total occlusion of the proximal left internal carotid artery, 11/29/2011.  f. Markedly reduced echocardiographic LVEF (0.25) with left ventricular chamber enlargement and thickened mitral valve leaflets with moderate MR in the absence of pericardial effusion or intracavitary thrombus/mass with markedly abnormal EKG demonstrating LBBB with marked QRS widening, spring 2013.  g. Recurrent progressive chest pain syndrome with severe single-vessel obstructive coronary disease and 100% total occlusion proximal right coronary artery at site of previously placed stent with mild nonobstructive plaque in the left coronary artery system and previously placed stents in the LAD, ramus intermedius artery patent with left ventriculography deferred and moderate bilateral common iliac disease, April 2013, with subsequent bi-ventricular AICD implant ([employer], model #[id number]) by Dr. Kong.  h. Acute non-ST elevation myocardial infarction/left heart catheterization, Dr. Lemus, with Xience drug-eluting stent to the ramus intermedius: Nonobstructive 50% to 60% mid to  distal left anterior descending stenosis. Chronic total occlusion of the dominant right coronary artery served by left-sided collaterals with left ventricular systolic dysfunction. EF 10% to 15%, 08/20/2014.  i. Biventricular ICD interrogation 7/7/17; RA pacing 72%, RV pacing 96% longevity 1.8 years, mode switch less than 1%, longest was 7 hours 5/9/17, 1 noise reversion both channels  j. Residual CCS class I angina pectoris/NYHA class II-III exertional dyspnea and fatigue.   k. Remote apparent acute congestive heart failure with non-STEMI and possible bronchopneumonia with respiratory failure requiring intubation and apparent diagnostic coronary angiography with 3 stents placed with subsequent apparent AMA discharge - data deficit, [facility], [place], [place], November 2017.  l. Residual CCS class I angina pectoris/NYHA class III-IV biventricular CHF, December 2017.  m. Residual class I symptoms, June 2018, October 2018, July 2019 with progressive angina pectoris and abnormal IV Lexiscan Cardiolite study (lateral ischemia with severe reduction in LVEF 0.10) with [facility] demonstrating severe 3-vessel disease with intervention to mid-LCx, February 2020, with residual class I symptoms, March 2020, September 2020, January 2021.   n. Remote [employer] device check November 2020 demonstrated battery 68%, 1% atrial fib burden, 99% BiV pacing, 85% atrial pacing, with one episode of nonsustained V. Tach, January 2021 demonstrated battery 65%, 1% atrial fibrillation burden, 97% BiV, 82% atrial pacing.   o. Recent residual class 1 symptoms on limited activity with acceptable device interrogation, January 2021.  p. Echocardiogram, May 2021, with severely decreased LV systolic function (LVEF 0.21-0.25) and subsequent stress test indicating a large-sized infarct located in the lateral wall, inferior apical wall and apex with no significant ischemia noted. (LVEF 0.22) (interpreted by Dr. Lemus) with residual  CCS I angina pectoris/NYHA class III-IV biventricular CHF, June 2021  q. Residual class I symptoms on limited activity with interim [facility] advanced heart failure evaluation and desire to avoid LVAD/transplant evaluation at this time, September 2021  2. Peripheral vascular disease:  a. Cardiac catheterization revealing total occlusion of left internal carotid artery previously in 2011.  b. CT angiogram of the neck showing total occlusion of the left internal carotid artery, 50% to 60% stenosis of the right carotid bulb, stent in the brachiocephalic artery placed by Dr. Santamaria in November 2011, totally occluded, March 2013.  c. Occluded left carotid artery with occluded innominate artery and intracranial circulation via thyroid artery collaterals and left vertebral with left axillary to right axillary bypass graft using 8 mm. PTFE ringed Propaten graft with arteriogram to the right subclavian artery and right axillary artery and right carotid artery, April 2013.  d. Remote hospitalization, [facility], with congestive heart failure and stroke - data deficit (December 2017), with apparent acute injury noted.  3. Cerebrovascular accident and initiation of warfarin therapy, March 2013.  4. Atrial fibrillation on presentation to outside hospital converted to sinus with Cardizem drip and subsequent recurrence of atrial fibrillation while in the ICU, treated with amiodarone and returned to sinus, August 2014, successful ECV, April 2021.  5. Dyslipidemia.  6. Stage 3 chronic kidney disease.   7. Left bundle branch block, probably combined ischemic/nonischemic cardiomyopathy.  8. Remote acute-on-chronic respiratory failure with hospital admission for pneumonia on ventilator.  9. History of hypertension.  10. Tobacco abuse, previously resolved but recurrent, with cessation using Chantix, winter of 2015, with recent discontinuation, autumn 2019  11. No prior additional surgical intervention.   12.  Progressive erectile dysfunction.  13. Serial acceptable AICD interrogations: February 2014; August 2014; March 2015, July 2017, with home Merlin monitoring, with ICD generator change, March 2019  14. Hyperthyroidism with abnormal thyroid ultrasound documenting thyroid nodule-data deficit.  2017  15. [facility] [place] [facility] hospitalization 10/9/2020-10/13/2020 for aspiration pneumonia requiring intubation  16.  [facility] [place] [facility] overnight hospitalization December 2020 for non-STEMI  17. [facility] ED visit, June 2021, for fall in which he hit his head but did not lose consciousness. XR Chest and CT Head were abnormal but acceptable.    No Known Allergies    Current Outpatient Medications:   •  albuterol (PROVENTIL) (5 MG/ML) 0.5% nebulizer solution, Take 0.5 mL by nebulization Every 6 (Six) Hours As Needed for Wheezing., Disp: 1 mL, Rfl: 12  •  amiodarone (PACERONE) 200 MG tablet, Take 1 tablet by mouth 2 (Two) Times a Day. For 14 days then decrease to 1 tablet daily, Disp: 30 tablet, Rfl: 5  •  apixaban (ELIQUIS) 5 MG tablet tablet, Take 1 tablet by mouth Every 12 (Twelve) Hours., Disp: 180 tablet, Rfl: 3  •  atorvastatin (LIPITOR) 80 MG tablet, TAKE 1 TABLET BY MOUTH EVERY NIGHT AT BEDTIME, Disp: 90 tablet, Rfl: 3  •  carvedilol (COREG) 6.25 MG tablet, Take 1 tablet by mouth 2 (Two) Times a Day With Meals., Disp: 180 tablet, Rfl: 3  •  Entresto 24-26 MG tablet, TAKE 1 TABLET BY MOUTH TWICE DAILY, Disp: 180 tablet, Rfl: 3  •  midodrine (PROAMATINE) 5 MG tablet, Take 1 tablet by mouth 3 (Three) Times a Day Before Meals., Disp: 270 tablet, Rfl: 3  •  nitroglycerin (NITROSTAT) 0.4 MG SL tablet, 1 under the tongue as needed for angina, may repeat q5mins for up three doses, Disp: 100 tablet, Rfl: 11  •  potassium chloride (K-DUR,KLOR-CON) 20 MEQ CR tablet, take 1 tablet by mouth once daily (Patient taking differently: Take 20 mEq by mouth As Needed. Do not crush. Take with food.), Disp: 30  "tablet, Rfl: 5  •  prasugrel (EFFIENT) 10 MG tablet, Take 1 tablet by mouth Daily., Disp: 30 tablet, Rfl: 5  •  ranolazine (Ranexa) 500 MG 12 hr tablet, Take 1 tablet by mouth 2 (Two) Times a Day., Disp: 60 tablet, Rfl: 3  •  spironolactone (ALDACTONE) 25 MG tablet, TAKE 1 TABLET BY MOUTH DAILY, Disp: 90 tablet, Rfl: 1  •  torsemide (DEMADEX) 20 MG tablet, Take 1 tablet by mouth Daily., Disp: 90 tablet, Rfl: 1  •  Ventolin  (90 Base) MCG/ACT inhaler, Inhale 2 puffs Every 6 (Six) Hours As Needed for Wheezing., Disp: 18 g, Rfl: 1    History of Present Illness: Patient returns for scheduled 3-month follow up. He has been stable overall from a cardiovascular standpoint. Patient denies chest pain, shortness of breath, palpitations, edema, dizziness, and syncope. He has followed up with cardiology at  and does not wish to pursue any aggressive measures such as heart transplant or LVAD at this time. He has had no additional interim ER visits, hospitalizations, serious illnesses, or surgeries. He has received COVID immunization. He uses nasal oxygen therapy at night and sleeps comfortably. He is up and about at home without cardiopulmonary complaints at this time.      ROS   Obtained and negative except as outlined in problem list and HPI.    Procedures       Objective:       Vitals:    09/03/21 0930 09/03/21 0932   BP: 97/65 101/65   BP Location: Left arm Left arm   Patient Position: Sitting Standing   Cuff Size: Adult Adult   Pulse: 77 74   SpO2: 99%    Weight: 74.8 kg (165 lb)    Height: 177.8 cm (70\")      Body mass index is 23.68 kg/m².  Last weight: 168 lbs    Vitals reviewed.   Constitutional:       Appearance: Well-developed.   Neck:      Thyroid: No thyromegaly.      Vascular: No carotid bruit or JVD.      Lymphadenopathy: No cervical adenopathy.   Pulmonary:      Effort: Pulmonary effort is normal.      Breath sounds: Decreased breath sounds present. No wheezing. No rhonchi. No rales.   Chest:      " Comments: Left precordial PCD is nominal.  Cardiovascular:      Regular rhythm.      Murmurs: There is a grade 2/6 high frequency blowing holosystolic murmur at the apex.      No gallop. No S3 gallop.   Pulses:     Carotid: with bruit bilaterally.     Dorsalis pedis: 1+ bilaterally.     Posterior tibial: 1+ bilaterally.  Edema:     Pretibial: bilateral trace edema of the pretibial area.     Ankle: bilateral trace edema of the ankle.  Abdominal:      Palpations: Abdomen is soft. There is no abdominal mass.      Tenderness: There is no abdominal tenderness.   Musculoskeletal: Normal range of motion. Skin:     General: Skin is warm and dry.      Findings: No rash.   Neurological:      Mental Status: Alert and oriented to person, place, and time.           Lab Review:   Lab Results   Component Value Date    GLUCOSE 106 (H) 08/10/2021    GLUCOSE 128 (H) 06/24/2021    BUN 19 08/10/2021    BUN 36 (H) 06/24/2021    CREATININE 1.61 (H) 08/10/2021    CREATININE 1.87 (H) 06/24/2021    EGFRIFNONA 43 (L) 08/10/2021    EGFRIFAFRI 46 (L) 05/25/2021    BCR 11.8 08/10/2021     (L) 08/10/2021     (L) 06/24/2021    K 5.0 08/10/2021    K 4.4 06/24/2021    CL 98 08/10/2021    MG 2.3 06/24/2021    CO2 25.5 08/10/2021    CALCIUM 9.6 08/10/2021    PROTENTOTREF 6.5 05/25/2021    ALBUMIN 3.51 06/24/2021    LABIL2 2.1 05/25/2021     (H) 06/24/2021     (H) 06/24/2021       Lab Results   Component Value Date    WBC 8.42 06/24/2021    HGB 14.4 06/24/2021    HCT 44.1 06/24/2021    MCV 88.0 06/24/2021     06/24/2021       Lab Results   Component Value Date    TSH 2.450 06/24/2021     MURJ, 8/11/2021:  Normal device function  12% AT/AF burden  Battery is at 55% (2.92 years)        Assessment:       Overall continued acceptable course with no new interim cardiopulmonary complaints with limited stable functional status. We will defer additional diagnostic or therapeutic intervention from a cardiac perspective at this  time.     Diagnosis Plan   1. Ischemic cardiomyopathy  No recurrent angina pectoris or CHF on current activity schedule; continue current treatment   2. Ischemic heart disease  No recurrent angina pectoris or CHF on current activity schedule; continue current treatment   3. PAF (paroxysmal atrial fibrillation) (CMS/HCC)  Stable without documented recurrence.   4. Essential hypertension  Well controlled. Continue current treatment.   5. Dyslipidemia  No data to review. Continue atorvastatin.          Plan:         1. Patient to continue current medications and close follow up with the above providers.  2. Tentative cardiology follow up in December 2021 or patient may return sooner PRN.    I, Emile Green, attest that this documentation has been prepared under the direction and in the presence of Andrea Guevara MD 09/03/2021    I, Andrea Guevara MD, [facility], personally performed the services described in this documentation as scribed by the above named individual in my presence, and it is both accurate and complete. At 10:02 EDT on 09/03/2021

## 2021-09-20 ENCOUNTER — OFFICE VISIT (OUTPATIENT)
Dept: CARDIOLOGY | Facility: CLINIC | Age: 66
End: 2021-09-20

## 2021-09-20 ENCOUNTER — LAB (OUTPATIENT)
Dept: LAB | Facility: HOSPITAL | Age: 66
End: 2021-09-20

## 2021-09-20 VITALS
WEIGHT: 177 LBS | SYSTOLIC BLOOD PRESSURE: 110 MMHG | BODY MASS INDEX: 25.34 KG/M2 | DIASTOLIC BLOOD PRESSURE: 90 MMHG | HEIGHT: 70 IN | HEART RATE: 78 BPM

## 2021-09-20 DIAGNOSIS — I48.0 PAF (PAROXYSMAL ATRIAL FIBRILLATION) (HCC): ICD-10-CM

## 2021-09-20 DIAGNOSIS — I48.0 PAF (PAROXYSMAL ATRIAL FIBRILLATION) (HCC): Primary | ICD-10-CM

## 2021-09-20 DIAGNOSIS — I25.5 ISCHEMIC CARDIOMYOPATHY: ICD-10-CM

## 2021-09-20 DIAGNOSIS — Z95.810 CARDIAC RESYNCHRONIZATION THERAPY DEFIBRILLATOR (CRT-D) IN PLACE: ICD-10-CM

## 2021-09-20 PROCEDURE — 93000 ELECTROCARDIOGRAM COMPLETE: CPT | Performed by: PHYSICIAN ASSISTANT

## 2021-09-20 PROCEDURE — 93284 PRGRMG EVAL IMPLANTABLE DFB: CPT | Performed by: PHYSICIAN ASSISTANT

## 2021-09-20 PROCEDURE — 99214 OFFICE O/P EST MOD 30 MIN: CPT | Performed by: PHYSICIAN ASSISTANT

## 2021-09-20 NOTE — PROGRESS NOTES
Reza Mcclelland  1955  PCP: Nevaeh Odonnell APRN    SUBJECTIVE:   Reza Mcclelland is a 66 y.o. male seen for a follow up visit regarding the following:     Chief Complaint: Follow up for CAD, ICM s/p BIV ICD, PAF     HPI:    Patient is a 66 year old male with a history of CAD, ICM s/p BIV ICD, and PAF that presents today for routine follow-up. He has remained in sinus rhythm since his ECV in April. He has had continued heart failure exacerbations since he last followed up with EP. He has been evaluated at [facility] for possible LVAD/heart transplant and he is deferring any advanced treatment. He had a visit to the ED on 6/24/21 after a fall/hyoptension. A CMP during that visit revealed elevated LFTs and these have not been rechecked. He is currently on Amiodarone 200mg daily. He denies any worsening SOB/ALTAMIRANO today. His weight has been remaining stable. No chest pain.     Problem List:  1. Ischemic heart disease/ischemic cardiomyopathy:  a. Remote non-ST-elevation myocardial infarction with emergent left heart catheterization and successful percutaneous transluminal coronary angioplasty and stenting of the ramus intermedius with a 3.0 x 12 mm Xience drug-eluting stent, 11/19/2008.  b. Left heart catheterization revealing severe stenosis in the LAD of about 80% to 90% in the mid portion with 2.5 x 28 mm, 3.0 x 28 mm, and 3.5 x 18 mm Xience drug-eluting stents in the mid and proximal portion of the LAD, critical left anterior descending stenosis with recommendations to return in 2 weeks for further intervention, 12/04/2008.  c. Left heart catheterization revealing critical right coronary artery stenosis with 3.5 x 28 mm Xience drug-eluting stent deployed in the mid RCA and Xience drug-eluting stent deployed in the proximal RCA, also revealing severe peripheral vascular disease with critical bilateral stenosis of common iliac artery with recommendations for the patient to remain on Plavix and aspirin and  further evaluation of iliac stenosis, 12/18/2008.  d. Recurrent CCS class II-III angina with subsequent left heart catheterization and stenting with a 3.5 x 15 mm Xience drug-eluting stent to the proximal LAD and 2.5 x 18 mm Xience drug-eluting stent into the distal LAD with widely patent stents in the RCA, 90% lesion in the stent of the LAD and widely patent stent of the diagonal branch. LVEF (0.65) with no wall motion abnormalities. Severe peripheral arterial disease with 60% stenosis of the left common iliac artery and 80% to 90% stenosis in the distal common femoral artery with recommendations for abdominal aortogram with right and left lower extremity arteriogram, 01/21/2011.  e. Cardiac catheterization with percutaneous transluminal angioplasty and self-expanding stent placement to the proximal innominate artery, distal embolic protection filter placement in the right carotid artery for cerebral protection during procedure, 10 x 20 mm Xact self-expanding stent deployed to the proximal innominate stenosis, total occlusion of the proximal left internal carotid artery, 11/29/2011.  f. Markedly reduced echocardiographic LVEF (0.25) with left ventricular chamber enlargement and thickened mitral valve leaflets with moderate MR in the absence of pericardial effusion or intracavitary thrombus/mass with markedly abnormal EKG demonstrating LBBB with marked QRS widening, spring 2013.  g. Recurrent progressive chest pain syndrome with severe single-vessel obstructive coronary disease and 100% total occlusion proximal right coronary artery at site of previously placed stent with mild nonobstructive plaque in the left coronary artery system and previously placed stents in the LAD, ramus intermedius artery patent with left ventriculography deferred and moderate bilateral common iliac disease, April 2013, with subsequent bi-ventricular AICD implant ([employer], model #[id number]) by Dr. Kong.  h. Acute non-ST  elevation myocardial infarction/left heart catheterization, Dr. Lemus, with Xience drug-eluting stent to the ramus intermedius: Nonobstructive 50% to 60% mid to distal left anterior descending stenosis. Chronic total occlusion of the dominant right coronary artery served by left-sided collaterals with left ventricular systolic dysfunction. EF 10% to 15%, 08/20/2014.  i. Biventricular ICD interrogation 7/7/17; RA pacing 72%, RV pacing 96% longevity 1.8 years, mode switch less than 1%, longest was 7 hours 5/9/17, 1 noise reversion both channels  j. Residual CCS class I angina pectoris/NYHA class II-III exertional dyspnea and fatigue.   k. Remote apparent acute congestive heart failure with non-STEMI and possible bronchopneumonia with respiratory failure requiring intubation and apparent diagnostic coronary angiography with 3 stents placed with subsequent apparent AMA discharge - data deficit, [facility], [place], operated by [facility], [place], [place], November 2017.  l. Residual CCS class I angina pectoris/NYHA class III-IV biventricular CHF, December 2017.  m. Residual class I symptoms, June 2018, October 2018, July 2019 with progressive angina pectoris and abnormal IV Lexiscan Cardiolite study (lateral ischemia with severe reduction in LVEF 0.10) with [facility] demonstrating severe 3-vessel disease with intervention to mid-LCx, February 2020, with residual class I symptoms, March 2020, September 2020, January 2021.   n. Remote [employer] device check November 2020 demonstrated battery 68%, 1% atrial fib burden, 99% BiV pacing, 85% atrial pacing, with one episode of nonsustained V. Tach, January 2021 demonstrated battery 65%, 1% atrial fibrillation burden, 97% BiV, 82% atrial pacing.   o. Recent residual class 1 symptoms on limited activity with acceptable device interrogation, January 2021.  p. Echocardiogram, May 2021, with severely decreased LV systolic function (LVEF 0.21-0.25) and subsequent stress test indicating a  large-sized infarct located in the lateral wall, inferior apical wall and apex with no significant ischemia noted. (LVEF 0.22) (interpreted by Dr. Lemus) with residual CCS I angina pectoris/NYHA class III-IV biventricular CHF, June 2021  q. Residual class I symptoms on limited activity with interim [facility] advanced heart failure evaluation and desire to avoid LVAD/transplant evaluation at this time, September 2021  2. Peripheral vascular disease:  a. Cardiac catheterization revealing total occlusion of left internal carotid artery previously in 2011.  b. CT angiogram of the neck showing total occlusion of the left internal carotid artery, 50% to 60% stenosis of the right carotid bulb, stent in the brachiocephalic artery placed by Dr. Santamaria in November 2011, totally occluded, March 2013.  c. Occluded left carotid artery with occluded innominate artery and intracranial circulation via thyroid artery collaterals and left vertebral with left axillary to right axillary bypass graft using 8 mm. PTFE ringed Propaten graft with arteriogram to the right subclavian artery and right axillary artery and right carotid artery, April 2013.  d. Remote hospitalization, [facility] [place], with congestive heart failure and stroke - data deficit (December 2017), with apparent acute injury noted.  3. Cerebrovascular accident and initiation of warfarin therapy, March 2013.  4. Atrial fibrillation on presentation to outside hospital converted to sinus with Cardizem drip and subsequent recurrence of atrial fibrillation while in the ICU, treated with amiodarone and returned to sinus, August 2014, successful ECV, April 2021.  5. Dyslipidemia.  6. Stage 3 chronic kidney disease.   7. Left bundle branch block, probably combined ischemic/nonischemic cardiomyopathy.  8. Remote acute-on-chronic respiratory failure with hospital admission for pneumonia on ventilator.  9. History of hypertension.  10. Tobacco abuse, previously  resolved but recurrent, with cessation using Chantix, winter of 2015, with recent discontinuation, autumn 2019  11. No prior additional surgical intervention.   12. Progressive erectile dysfunction.  13. Serial acceptable AICD interrogations: February 2014; August 2014; March 2015, July 2017, with home [place] monitoring, with ICD generator change, March 2019  14. Hyperthyroidism with abnormal thyroid ultrasound documenting thyroid nodule-data deficit.  2017  15. [facility] hospitalization 10/9/2020-10/13/2020 for aspiration pneumonia requiring intubation  16.  [facility] overnight hospitalization December 2020 for non-STEMI  17. [facility] ED visit, June 2021, for fall in which he hit his head but did not lose consciousness. XR Chest and CT Head were abnormal but acceptable.      Current Outpatient Medications:   •  albuterol (PROVENTIL) (5 MG/ML) 0.5% nebulizer solution, Take 0.5 mL by nebulization Every 6 (Six) Hours As Needed for Wheezing., Disp: 1 mL, Rfl: 12  •  amiodarone (PACERONE) 200 MG tablet, Take 1 tablet by mouth 2 (Two) Times a Day. For 14 days then decrease to 1 tablet daily, Disp: 30 tablet, Rfl: 5  •  apixaban (ELIQUIS) 5 MG tablet tablet, Take 1 tablet by mouth Every 12 (Twelve) Hours., Disp: 180 tablet, Rfl: 3  •  atorvastatin (LIPITOR) 80 MG tablet, TAKE 1 TABLET BY MOUTH EVERY NIGHT AT BEDTIME, Disp: 90 tablet, Rfl: 3  •  carvedilol (COREG) 6.25 MG tablet, Take 1 tablet by mouth 2 (Two) Times a Day With Meals., Disp: 180 tablet, Rfl: 3  •  Entresto 24-26 MG tablet, TAKE 1 TABLET BY MOUTH TWICE DAILY, Disp: 180 tablet, Rfl: 3  •  midodrine (PROAMATINE) 5 MG tablet, Take 1 tablet by mouth 3 (Three) Times a Day Before Meals., Disp: 270 tablet, Rfl: 3  •  nitroglycerin (NITROSTAT) 0.4 MG SL tablet, 1 under the tongue as needed for angina, may repeat q5mins for up three doses, Disp: 100 tablet, Rfl: 11  •  potassium chloride (K-DUR,KLOR-CON) 20 MEQ  CR tablet, take 1 tablet by mouth once daily (Patient taking differently: Take 20 mEq by mouth As Needed. Do not crush. Take with food.), Disp: 30 tablet, Rfl: 5  •  prasugrel (EFFIENT) 10 MG tablet, Take 1 tablet by mouth Daily., Disp: 30 tablet, Rfl: 5  •  ranolazine (Ranexa) 500 MG 12 hr tablet, Take 1 tablet by mouth 2 (Two) Times a Day., Disp: 60 tablet, Rfl: 3  •  spironolactone (ALDACTONE) 25 MG tablet, TAKE 1 TABLET BY MOUTH DAILY, Disp: 90 tablet, Rfl: 1  •  torsemide (DEMADEX) 20 MG tablet, Take 1 tablet by mouth Daily., Disp: 90 tablet, Rfl: 1  •  Ventolin  (90 Base) MCG/ACT inhaler, Inhale 2 puffs Every 6 (Six) Hours As Needed for Wheezing., Disp: 18 g, Rfl: 1    Past Medical History, Past Surgical History, Family history, Social History, and Medications were all reviewed with the patient today and updated as necessary.       Patient Active Problem List   Diagnosis   • Coronary artery disease involving native coronary artery of native heart without angina pectoris   • Ischemic cardiomyopathy   • Peripheral vascular disease (CMS/HCC)   • Cerebrovascular accident (CMS/HCC)   • PAF (paroxysmal atrial fibrillation) (CMS/[facility])   • Hyperlipidemia LDL goal <70   • Chronic kidney disease (CKD), stage III (moderate) (CMS/HCC)   • Left bundle branch block   • Essential hypertension   • COPD   • Cardiac resynchronization therapy defibrillator (CRT-D) in place   • Carotid occlusion, left   • Leukocytosis   • Silent aspiration   • Dyslipidemia     No Known Allergies  Past Medical History:   Diagnosis Date   • Acute on chronic respiratory failure (CMS/HCC)     Recent acute-on-chronic respiratory failure with hospital admission for pneumonia on ventilator.   • AICD (automatic cardioverter/defibrillator) present    • Atrial fibrillation (CMS/HCC)     Atrial fibrillation on presentation to outside hospital converted to sinus with Cardizem drip and subsequent recurrence of atrial fibrillation while in the ICU,  treated with amiodarone and returned to sinus, August 2014.   • CAD (coronary artery disease)    • Cerebrovascular accident (CMS/[facility])     Cerebrovascular accident and initiation of warfarin therapy, March 2013.   • Chronic kidney disease (CKD), stage III (moderate) (CMS/[facility])    • COPD (chronic obstructive pulmonary disease) (CMS/[facility])    • Dyslipidemia    • History of hypertension    • Ischemic cardiomyopathy    • Ischemic heart disease    • Left bundle branch block     Left bundle branch block, probably combined ischemic/nonischemic cardiomyopathy.   • Peripheral vascular disease (CMS/[facility])      Past Surgical History:   Procedure Laterality Date   • AXILLARY AXILLARY BYPASS     • CARDIAC CATHETERIZATION      stents   • CARDIAC CATHETERIZATION N/A 2/12/2020    Procedure: Left Heart Cath;  Surgeon: Guillaume Shore MD;  Location:  [facility] CATH INVASIVE LOCATION;  Service: Cardiology;  Laterality: N/A;   • CARDIAC DEFIBRILLATOR PLACEMENT     • CARDIAC ELECTROPHYSIOLOGY PROCEDURE N/A 3/13/2019    Procedure: BIV ICD generator change- SJM in 4-6 weeks.;  Surgeon: Reza Lopes MD;  Location:  [facility] EP INVASIVE LOCATION;  Service: Cardiology   • CAROTID STENT     • INSERT / REPLACE / REMOVE PACEMAKER       Family History   Problem Relation Age of Onset   • Dementia Mother    • Prostate cancer Father    • Heart disease Father    • Cancer Brother         kidney   • No Known Problems Sister    • Heart failure Maternal Grandmother    • Heart disease Maternal Grandmother    • Heart attack Maternal Grandmother    • Heart disease Maternal Grandfather    • Heart attack Maternal Grandfather    • No Known Problems Paternal Grandmother    • Heart attack Paternal Grandfather    • No Known Problems Sister    • Heart attack Paternal Uncle    • Heart disease Paternal Uncle      Social History     Tobacco Use   • Smoking status: Former Smoker     Packs/day: 1.00     Years: 48.00     Pack years: 48.00     Types: Cigarettes     Quit  "date: 11/27/2017     Years since quitting: 3.8   • Smokeless tobacco: Never Used   • Tobacco comment: Pt quit smoking 6-7 months ago   Substance Use Topics   • Alcohol use: No         PHYSICAL EXAM:    /90   Pulse 78   Ht 177.8 cm (70\")   Wt 80.3 kg (177 lb)   BMI 25.40 kg/m²        Wt Readings from Last 5 Encounters:   09/20/21 80.3 kg (177 lb)   09/03/21 74.8 kg (165 lb)   08/10/21 73.2 kg (161 lb 6 oz)   06/28/21 76.2 kg (168 lb)   06/24/21 72.6 kg (160 lb)       BP Readings from Last 5 Encounters:   09/20/21 110/90   09/03/21 101/65   08/10/21 111/69   06/28/21 95/65   06/24/21 93/66       General-Well Nourished, Well developed  Eyes - PERRLA  Neck- supple, No mass  CV- regular rate and rhythm, no MRG, No edema  Lung- clear bilaterally  Abd- soft, +BS  Musc/skel - Norm strength and range of motion  Skin- warm and dry  Neuro - Alert & Oriented x 3, appropriate mood.        Medical problems and test results were reviewed with the patient today.     No results found for this or any previous visit (from the past 672 hour(s)).      EKG: (EKG has been independently visualized by me and summarized below)      ECG 12 Lead    Date/Time: 9/20/2021 9:56 AM  Performed by: Celia Vela PA  Authorized by: Celia Vela PA   Comparison: compared with previous ECG from 6/24/2021  Similar to previous ECG  Comparison to previous ECG: AV paced   Rhythm: paced  Rate: normal  BPM: 69    Clinical impression: abnormal EKG             ASSESSMENT and PLAN    1.  Chronic systolic heart failure- Continue optimal rx with Coreg, Entresto, Aldactone and torsemide. LVEF 20-25%. Follows with Dr. Geuvara who has referred to [facility] for advanced heart failure options including LVAD vs IV milrinone. He wishes to not proceed with any advanced heart failure treatment at this time. He is compensated on exam today.   2.  Biventricular ICD - [employer] - Post Gen Change in March 2019. Normal device function today. 99% BIV pacing. No afib " since ECV. 1 NSVT  3.  Atrial Fibrillation - [place] greatly increased and he was restarted on Amiodarone and underwent ECV April 2021. No recurrent afib since that time. LFTs noted to be very elevated in June 2021. I will discontinue his Amiodarone today for that reason and reassess his LFTs. He will continue Eliquis and Coreg. Long run if recurrence can consider AV node ablation.   4.  Use of Amio -CMP from 6/24/21 w/ elevated LFTs which is new from previous CMP in early June 2021. I will discontinue amiodarone today as they are significantly elevated. Will recheck CMP today to reassess.   5.  CAD- recent NACHO to mid left circumflex 2/2020. Noted to have significant CAD unamendable to revascularization. Continue Effient, statin. Dr. Guevara/Patrick are addressing. Most recent nuclear stress test with no significant ischemia noted.   6.  Chronic Kidney disease - Monitor labs with medication changes.   7. Elevated LFTs- will recheck today. Discontinuing Amiodarone.       Return in about 6 months (around 3/20/2022) for SJM.    Discontinue Amiodarone due to elevated LFTs   Recheck CMP    Celia Vela PA-C   Cardiology/Electrophysiology  9/20/2021  10:18 EDT

## 2021-09-21 LAB
ALBUMIN SERPL-MCNC: 4.1 G/DL (ref 3.8–4.8)
ALBUMIN/GLOB SERPL: 1.9 {RATIO} (ref 1.2–2.2)
ALP SERPL-CCNC: 146 IU/L (ref 44–121)
ALT SERPL-CCNC: 13 IU/L (ref 0–44)
AST SERPL-CCNC: 16 IU/L (ref 0–40)
BILIRUB SERPL-MCNC: 1.1 MG/DL (ref 0–1.2)
BUN SERPL-MCNC: 30 MG/DL (ref 8–27)
BUN/CREAT SERPL: 19 (ref 10–24)
CALCIUM SERPL-MCNC: 9 MG/DL (ref 8.6–10.2)
CHLORIDE SERPL-SCNC: 99 MMOL/L (ref 96–106)
CO2 SERPL-SCNC: 25 MMOL/L (ref 20–29)
CREAT SERPL-MCNC: 1.61 MG/DL (ref 0.76–1.27)
GLOBULIN SER CALC-MCNC: 2.2 G/DL (ref 1.5–4.5)
GLUCOSE SERPL-MCNC: 107 MG/DL (ref 65–99)
POTASSIUM SERPL-SCNC: 4.5 MMOL/L (ref 3.5–5.2)
PROT SERPL-MCNC: 6.3 G/DL (ref 6–8.5)
SODIUM SERPL-SCNC: 140 MMOL/L (ref 134–144)

## 2021-09-30 RX ORDER — TORSEMIDE 20 MG/1
20 TABLET ORAL DAILY
Qty: 90 TABLET | Refills: 1 | Status: SHIPPED | OUTPATIENT
Start: 2021-09-30 | End: 2022-02-17 | Stop reason: SDUPTHER

## 2021-10-12 RX ORDER — ALBUTEROL SULFATE 90 UG/1
2 AEROSOL, METERED RESPIRATORY (INHALATION) EVERY 6 HOURS PRN
Qty: 18 G | Refills: 1 | Status: SHIPPED | OUTPATIENT
Start: 2021-10-12 | End: 2022-07-26 | Stop reason: SDUPTHER

## 2021-10-16 DIAGNOSIS — I25.709 CORONARY ARTERY DISEASE INVOLVING CORONARY BYPASS GRAFT OF NATIVE HEART WITH ANGINA PECTORIS (HCC): ICD-10-CM

## 2021-10-17 RX ORDER — RANOLAZINE 500 MG/1
TABLET, EXTENDED RELEASE ORAL
Qty: 60 TABLET | Refills: 6 | Status: SHIPPED | OUTPATIENT
Start: 2021-10-17 | End: 2022-05-25 | Stop reason: SDUPTHER

## 2021-11-10 ENCOUNTER — TELEPHONE (OUTPATIENT)
Dept: CARDIOLOGY | Facility: CLINIC | Age: 66
End: 2021-11-10

## 2021-11-10 NOTE — TELEPHONE ENCOUNTER
Spoke with patient significant other regarding Mr. Mcclelland's home monitor not sending in its scheduled reading last night. I asked her to have him send in a manual reading. She was agreeable to this.

## 2021-11-23 PROCEDURE — 93296 REM INTERROG EVL PM/IDS: CPT | Performed by: STUDENT IN AN ORGANIZED HEALTH CARE EDUCATION/TRAINING PROGRAM

## 2021-11-23 PROCEDURE — 93295 DEV INTERROG REMOTE 1/2/MLT: CPT | Performed by: STUDENT IN AN ORGANIZED HEALTH CARE EDUCATION/TRAINING PROGRAM

## 2021-11-29 RX ORDER — SPIRONOLACTONE 25 MG/1
25 TABLET ORAL DAILY
Qty: 90 TABLET | Refills: 1 | Status: SHIPPED | OUTPATIENT
Start: 2021-11-29 | End: 2022-05-02

## 2021-12-29 ENCOUNTER — OFFICE VISIT (OUTPATIENT)
Dept: FAMILY MEDICINE CLINIC | Facility: CLINIC | Age: 66
End: 2021-12-29

## 2021-12-29 VITALS
SYSTOLIC BLOOD PRESSURE: 112 MMHG | HEART RATE: 70 BPM | TEMPERATURE: 97.3 F | BODY MASS INDEX: 24.6 KG/M2 | DIASTOLIC BLOOD PRESSURE: 65 MMHG | HEIGHT: 70 IN | WEIGHT: 171.8 LBS

## 2021-12-29 DIAGNOSIS — R73.9 BLOOD GLUCOSE ELEVATED: ICD-10-CM

## 2021-12-29 DIAGNOSIS — I10 ESSENTIAL HYPERTENSION: ICD-10-CM

## 2021-12-29 DIAGNOSIS — I50.9 CONGESTIVE HEART FAILURE, UNSPECIFIED HF CHRONICITY, UNSPECIFIED HEART FAILURE TYPE (HCC): ICD-10-CM

## 2021-12-29 DIAGNOSIS — E78.5 HYPERLIPIDEMIA, UNSPECIFIED HYPERLIPIDEMIA TYPE: ICD-10-CM

## 2021-12-29 DIAGNOSIS — Z76.89 ENCOUNTER TO ESTABLISH CARE: Primary | ICD-10-CM

## 2021-12-29 PROCEDURE — 99215 OFFICE O/P EST HI 40 MIN: CPT | Performed by: NURSE PRACTITIONER

## 2021-12-29 NOTE — PROGRESS NOTES
Chief Complaint  Congestive Heart Failure    Subjective          Reza Mcclelland is a 66 y.o. male who presents today to [facility] for initial evaluation     HPI:   History of Present Illness    Presents to clinic to establish care. History of stage CHF, CAD, HLD, HTN, CVA with left sided weakness.  Does currently have some mild swelling in bilateral ankles and some shortness of breath that started this morning.  Reports he takes torsemide daily and is to take an extra dose if needed for swelling.  Has not taken an extra dose for current symptoms. Follows cardiology with [facility] [place] [employer] and due for f/u on 1/28/22.     Denies needing any refills.  Denies any acute concerns or issues at this time.      The following portions of the patient's history were reviewed and updated as appropriate: allergies, current medications, past family history, past medical history, past social history, past surgical history and problem list.    Objective     Problem List:  Patient Active Problem List   Diagnosis   • Coronary artery disease involving native coronary artery of native heart without angina pectoris   • Ischemic cardiomyopathy   • Peripheral vascular disease (HCC)   • Cerebrovascular accident (HCC)   • PAF (paroxysmal atrial fibrillation) ([facility])   • Hyperlipidemia LDL goal <70   • Chronic kidney disease (CKD), stage III (moderate) ([facility])   • Left bundle branch block   • Essential hypertension   • COPD   • Cardiac resynchronization therapy defibrillator (CRT-D) in place   • Carotid occlusion, left   • Leukocytosis   • Silent aspiration   • Dyslipidemia       Allergy:   No Known Allergies     Discontinued Medications:  Medications Discontinued During This Encounter   Medication Reason   • amiodarone (PACERONE) 200 MG tablet Historical Med - Therapy completed   • prasugrel (EFFIENT) 10 MG tablet Historical Med - Therapy completed       Current Medications:   Current Outpatient  Medications   Medication Sig Dispense Refill   • albuterol (PROVENTIL) (5 MG/ML) 0.5% nebulizer solution Take 0.5 mL by nebulization Every 6 (Six) Hours As Needed for Wheezing. 1 mL 12   • apixaban (ELIQUIS) 5 MG tablet tablet Take 1 tablet by mouth Every 12 (Twelve) Hours. 180 tablet 3   • atorvastatin (LIPITOR) 80 MG tablet TAKE 1 TABLET BY MOUTH EVERY NIGHT AT BEDTIME 90 tablet 3   • carvedilol (COREG) 6.25 MG tablet Take 1 tablet by mouth 2 (Two) Times a Day With Meals. 180 tablet 3   • Entresto 24-26 MG tablet TAKE 1 TABLET BY MOUTH TWICE DAILY 180 tablet 3   • midodrine (PROAMATINE) 5 MG tablet Take 1 tablet by mouth 3 (Three) Times a Day Before Meals. 270 tablet 3   • nitroglycerin (NITROSTAT) 0.4 MG SL tablet 1 under the tongue as needed for angina, may repeat q5mins for up three doses 100 tablet 11   • ranolazine (RANEXA) 500 MG 12 hr tablet TAKE 1 TABLET BY MOUTH TWICE DAILY 60 tablet 6   • spironolactone (ALDACTONE) 25 MG tablet TAKE 1 TABLET BY MOUTH DAILY 90 tablet 1   • torsemide (DEMADEX) 20 MG tablet TAKE 1 TABLET BY MOUTH DAILY 90 tablet 1   • potassium chloride (K-DUR,KLOR-CON) 20 MEQ CR tablet take 1 tablet by mouth once daily (Patient taking differently: Take 20 mEq by mouth As Needed. Do not crush. Take with food.) 30 tablet 5   • Ventolin  (90 Base) MCG/ACT inhaler Inhale 2 puffs Every 6 (Six) Hours As Needed for Wheezing. Further refills with PCP 18 g 1     No current facility-administered medications for this visit.       Past Medical History:  Past Medical History:   Diagnosis Date   • Acute on chronic respiratory failure (HCC)     Recent acute-on-chronic respiratory failure with hospital admission for pneumonia on ventilator.   • AICD (automatic cardioverter/defibrillator) present    • Atrial fibrillation (HCC)     Atrial fibrillation on presentation to outside hospital converted to sinus with Cardizem drip and subsequent recurrence of atrial fibrillation while in the ICU, treated  with amiodarone and returned to sinus, 2014.   • CAD (coronary artery disease)    • Cerebrovascular accident (HCC)     Cerebrovascular accident and initiation of warfarin therapy, 2013.   • Chronic kidney disease (CKD), stage III (moderate) (HCC)    • COPD (chronic obstructive pulmonary disease) (HCC)    • Dyslipidemia    • History of hypertension    • Ischemic cardiomyopathy    • Ischemic heart disease    • Left bundle branch block     Left bundle branch block, probably combined ischemic/nonischemic cardiomyopathy.   • Peripheral vascular disease ([facility])        Past Surgical History:  Past Surgical History:   Procedure Laterality Date   • AXILLARY AXILLARY BYPASS     • CARDIAC CATHETERIZATION      stents   • CARDIAC CATHETERIZATION N/A 2020    Procedure: Left Heart Cath;  Surgeon: Guillaume Shore MD;  Location:  [facility];  Service: Cardiology;  Laterality: N/A;   • CARDIAC DEFIBRILLATOR PLACEMENT     • CARDIAC ELECTROPHYSIOLOGY PROCEDURE N/A 3/13/2019    Procedure: BIV ICD generator change- SJM in 4-6 weeks.;  Surgeon: Reza Lopes MD;  Location:  [facility] EP INVASIVE LOCATION;  Service: Cardiology   • CAROTID STENT     • INSERT / REPLACE / REMOVE PACEMAKER         Social History:  Social History     Socioeconomic History   • Marital status: Single   Tobacco Use   • Smoking status: Former Smoker     Packs/day: 1.00     Years: 48.00     Pack years: 48.00     Types: Cigarettes     Quit date: 2017     Years since quittin.0   • Smokeless tobacco: Never Used   • Tobacco comment: Pt quit smoking 6-7 months ago   Vaping Use   • Vaping Use: Never used   Substance and Sexual Activity   • Alcohol use: No   • Drug use: No   • Sexual activity: Defer       Family History:  Family History   Problem Relation Age of Onset   • Dementia Mother    • Prostate cancer Father    • Heart disease Father    • Cancer Brother         kidney   • No Known Problems Sister    • Heart failure  "Maternal Grandmother    • Heart disease Maternal Grandmother    • Heart attack Maternal Grandmother    • Heart disease Maternal Grandfather    • Heart attack Maternal Grandfather    • No Known Problems Paternal Grandmother    • Heart attack Paternal Grandfather    • No Known Problems Sister    • Heart attack Paternal Uncle    • Heart disease Paternal Uncle        Review of Systems:  Review of Systems   Respiratory: Positive for shortness of breath.    Cardiovascular: Positive for leg swelling. Negative for chest pain.   Hematological: Does not bruise/bleed easily.       Physical Exam:  Physical Exam  Vitals and nursing note reviewed.   Constitutional:       General: He is not in acute distress.     Appearance: Normal appearance. He is not ill-appearing or toxic-appearing.   HENT:      Head: Normocephalic.   Eyes:      Pupils: Pupils are equal, round, and reactive to light.   Neck:      Vascular: No carotid bruit.   Cardiovascular:      Rate and Rhythm: Normal rate and regular rhythm.      Pulses: Normal pulses.      Heart sounds: Normal heart sounds.   Pulmonary:      Effort: Pulmonary effort is normal. No respiratory distress.      Breath sounds: Normal breath sounds.   Abdominal:      General: Bowel sounds are normal. There is no distension.      Palpations: Abdomen is soft.   Musculoskeletal:         General: Swelling (1+ pitting bilateral ankles) present.   Skin:     General: Skin is warm and dry.      Coloration: Skin is not pale.   Neurological:      Mental Status: He is alert and oriented to person, place, and time.      Motor: Weakness (LUE, LLE ) present.   Psychiatric:         Mood and Affect: Mood normal.         Behavior: Behavior normal.         Thought Content: Thought content normal.         Judgment: Judgment normal.         Vital Signs:   /65 (BP Location: Right arm, Patient Position: Sitting, Cuff Size: Adult)   Pulse 70   Temp 97.3 °F (36.3 °C) (Temporal)   Ht 177.8 cm (70\")   Wt 77.9 kg " (171 lb 12.8 oz)   BMI 24.65 kg/m²  RR: 18            Assessment and Plan   Diagnoses and all orders for this visit:    1. Encounter to establish care (Primary)  -     CBC & Differential; Future  -     Comprehensive Metabolic Panel; Future  -     Hemoglobin A1c; Future  -     Lipid Panel; Future  -     MicroAlbumin, Urine, Random - Urine, Clean Catch; Future  -     TSH Rfx On Abnormal To Free T4; Future  -     BNP; Future  Agrees to sign release for medical records.    2. Essential hypertension  -     CBC & Differential; Future  -     Comprehensive Metabolic Panel; Future  -     Hemoglobin A1c; Future  -     Lipid Panel; Future  -     MicroAlbumin, Urine, Random - Urine, Clean Catch; Future  -     TSH Rfx On Abnormal To Free T4; Future  -     BNP; Future  Continue current plan of care.    3. Hyperlipidemia, unspecified hyperlipidemia type  -     CBC & Differential; Future  -     Comprehensive Metabolic Panel; Future  -     Hemoglobin A1c; Future  -     Lipid Panel; Future  -     MicroAlbumin, Urine, Random - Urine, Clean Catch; Future  -     TSH Rfx On Abnormal To Free T4; Future  -     BNP; Future  Continue current plan of care.    4. Congestive heart failure, unspecified HF chronicity, unspecified heart failure type (HCC)  -     CBC & Differential; Future  -     Comprehensive Metabolic Panel; Future  -     Hemoglobin A1c; Future  -     Lipid Panel; Future  -     MicroAlbumin, Urine, Random - Urine, Clean Catch; Future  -     TSH Rfx On Abnormal To Free T4; Future  -     BNP; Future  Patient declines any labs or other testing at this time.  Reports will follow up with cardiologist if needed.  Continue current medication regimen.    5. Blood glucose elevated  -     Hemoglobin A1c; Future        Discussed possible differential diagnoses, testing, treatment, recommended non-pharmacological interventions, risks, warning signs to monitor for that would indicate need for follow-up in clinic or ER. If no improvement with  these regimens or you have new or worsening symptoms follow-up. Patient verbalizes understanding and agreement with plan of care. Denies further needs or concerns.     I spent 45 minutes caring for patient on this date of service. This time includes time spent by me in the following activities: preparing for the visit, reviewing tests, obtaining and/or reviewing a separately obtained history, performing a medically appropriate examination and/or evaluation, counseling and educating the patient/family/caregiver, ordering medications, tests, or procedures and documenting information in the medical record    Meds ordered during this visit:  No orders of the defined types were placed in this encounter.      Patient Instructions:  Patient instructions given for the following visit diagnosis:    ICD-10-CM ICD-9-CM   1. Encounter to establish care  Z76.89 V65.8   2. Essential hypertension  I10 401.9   3. Hyperlipidemia, unspecified hyperlipidemia type  E78.5 272.4   4. Congestive heart failure, unspecified HF chronicity, unspecified heart failure type (HCC)  I50.9 428.0   5. Blood glucose elevated  R73.9 790.29       Follow Up   Return for Next scheduled follow up.  Scheduled to follow-up with cardiology on 1/28/2022.  Keep this appointment.  Follow-up with clinic in 1 month.  Sooner for new or worsening symptoms.      This document has been electronically signed by DAKOTA Brown  December 29, 2021 18:42 EST    Patient was given instructions and counseling regarding his condition or for health maintenance advice. Please see specific information pulled into the AVS if appropriate.     Part of this note may be an electronic transcription/translation of spoken language to printed text using the Dragon Dictation System.

## 2022-01-28 ENCOUNTER — LAB (OUTPATIENT)
Dept: LAB | Facility: HOSPITAL | Age: 67
End: 2022-01-28

## 2022-01-28 ENCOUNTER — OFFICE VISIT (OUTPATIENT)
Dept: CARDIOLOGY | Facility: CLINIC | Age: 67
End: 2022-01-28

## 2022-01-28 VITALS
SYSTOLIC BLOOD PRESSURE: 106 MMHG | OXYGEN SATURATION: 98 % | BODY MASS INDEX: 23.31 KG/M2 | HEIGHT: 70 IN | WEIGHT: 162.8 LBS | HEART RATE: 89 BPM | DIASTOLIC BLOOD PRESSURE: 68 MMHG

## 2022-01-28 DIAGNOSIS — I25.5 ISCHEMIC CARDIOMYOPATHY: ICD-10-CM

## 2022-01-28 DIAGNOSIS — I48.0 PAF (PAROXYSMAL ATRIAL FIBRILLATION): ICD-10-CM

## 2022-01-28 DIAGNOSIS — I10 ESSENTIAL HYPERTENSION: ICD-10-CM

## 2022-01-28 DIAGNOSIS — E78.5 DYSLIPIDEMIA: ICD-10-CM

## 2022-01-28 DIAGNOSIS — I25.9 ISCHEMIC HEART DISEASE: Primary | ICD-10-CM

## 2022-01-28 PROCEDURE — 99214 OFFICE O/P EST MOD 30 MIN: CPT | Performed by: INTERNAL MEDICINE

## 2022-01-28 NOTE — PROGRESS NOTES
Subjective:     Encounter Date:01/28/2022    Patient ID: Reza Mcclelland is a 66 y.o. single white male, retired [employer] , from [place], [place].     FORMER PHYSICIAN: Rolanda Billingsley MD/EMILY Romo  CURRENT HEALTHCARE PROVIDER: DAKOTA Brown  NEUROLOGIST: Jonas Quiroz MD   CARDIOVASCULAR SURGEON: Guillaume Pruitt MD, [facility]  INTERVENTIONAL CARDIOLOGIST: Ismael Nguyen MD, [facility]/ Oliverio Lemus MD, [facility], [facility]/Guillaume Shore MD, [facility]  FORMER ELECTROPHYSIOLOGIST: Reza Lopes MD, [facility], [facility]: DAKOTA Peters  ENDOCRINOLOGIST: unknown  [facility] ADVANCED HEART FAILURE CARDIOLOGIST: Yuridia Mock MD    Chief Complaint:   Chief Complaint   Patient presents with   • Cardiomyopathy       Problem List:  1. Ischemic heart disease/ischemic cardiomyopathy:  a. Remote non-ST-elevation myocardial infarction with emergent left heart catheterization and successful percutaneous transluminal coronary angioplasty and stenting of the ramus intermedius with a 3.0 x 12 mm Xience drug-eluting stent, 11/19/2008.  b. Left heart catheterization revealing severe stenosis in the LAD of about 80% to 90% in the mid portion with 2.5 x 28 mm, 3.0 x 28 mm, and 3.5 x 18 mm Xience drug-eluting stents in the mid and proximal portion of the LAD, critical left anterior descending stenosis with recommendations to return in 2 weeks for further intervention, 12/04/2008.  c. Left heart catheterization revealing critical right coronary artery stenosis with 3.5 x 28 mm Xience drug-eluting stent deployed in the mid RCA and Xience drug-eluting stent deployed in the proximal RCA, also revealing severe peripheral vascular disease with critical bilateral stenosis of common iliac artery with recommendations for the patient to remain on Plavix and aspirin and further evaluation of iliac stenosis, 12/18/2008.  d. Recurrent CCS class II-III angina with subsequent left  heart catheterization and stenting with a 3.5 x 15 mm Xience drug-eluting stent to the proximal LAD and 2.5 x 18 mm Xience drug-eluting stent into the distal LAD with widely patent stents in the RCA, 90% lesion in the stent of the LAD and widely patent stent of the diagonal branch. LVEF (0.65) with no wall motion abnormalities. Severe peripheral arterial disease with 60% stenosis of the left common iliac artery and 80% to 90% stenosis in the distal common femoral artery with recommendations for abdominal aortogram with right and left lower extremity arteriogram, 01/21/2011.  e. Cardiac catheterization with percutaneous transluminal angioplasty and self-expanding stent placement to the proximal innominate artery, distal embolic protection filter placement in the right carotid artery for cerebral protection during procedure, 10 x 20 mm Xact self-expanding stent deployed to the proximal innominate stenosis, total occlusion of the proximal left internal carotid artery, 11/29/2011.  f. Markedly reduced echocardiographic LVEF (0.25) with left ventricular chamber enlargement and thickened mitral valve leaflets with moderate MR in the absence of pericardial effusion or intracavitary thrombus/mass with markedly abnormal EKG demonstrating LBBB with marked QRS widening, spring 2013.  g. Recurrent progressive chest pain syndrome with severe single-vessel obstructive coronary disease and 100% total occlusion proximal right coronary artery at site of previously placed stent with mild nonobstructive plaque in the left coronary artery system and previously placed stents in the LAD, ramus intermedius artery patent with left ventriculography deferred and moderate bilateral common iliac disease, April 2013, with subsequent bi-ventricular AICD implant ([employer] [facility] [employer], model #[id number]) by Dr. Kong.  h. Acute non-ST elevation myocardial infarction/left heart catheterization, Dr. Lemus, with Xience drug-eluting stent to the ramus  intermedius: Nonobstructive 50% to 60% mid to distal left anterior descending stenosis. Chronic total occlusion of the dominant right coronary artery served by left-sided collaterals with left ventricular systolic dysfunction. EF 10% to 15%, 08/20/2014.  i. Biventricular ICD interrogation 7/7/17; RA pacing 72%, RV pacing 96% longevity 1.8 years, mode switch less than 1%, longest was 7 hours 5/9/17, 1 noise reversion both channels  j. Residual CCS class I angina pectoris/NYHA class II-III exertional dyspnea and fatigue.   k. Remote apparent acute congestive heart failure with non-STEMI and possible bronchopneumonia with respiratory failure requiring intubation and apparent diagnostic coronary angiography with 3 stents placed with subsequent apparent AMA discharge - data deficit, [facility], [place], [place], November 2017.  l. Residual CCS class I angina pectoris/NYHA class III-IV biventricular CHF, December 2017.  m. Residual class I symptoms, June 2018, October 2018, July 2019 with progressive angina pectoris and abnormal IV Lexiscan Cardiolite study (lateral ischemia with severe reduction in LVEF 0.10) with [facility] demonstrating severe 3-vessel disease with intervention to mid-LCx, February 2020, with residual class I symptoms, March 2020, September 2020, January 2021.   n. Remote [facility] device check November 2020 demonstrated battery 68%, 1% atrial fib burden, 99% BiV pacing, 85% atrial pacing, with one episode of nonsustained V. Tach, January 2021 demonstrated battery 65%, 1% atrial fibrillation burden, 97% BiV, 82% atrial pacing.   o. Recent residual class 1 symptoms on limited activity with acceptable device interrogation, January 2021.  p. Echocardiogram, May 2021, with severely decreased LV systolic function (LVEF 0.21-0.25) and subsequent stress test indicating a large-sized infarct located in the lateral wall, inferior apical wall and apex with no significant ischemia noted. (LVEF  0.22) (interpreted by Dr. Lemus) with residual CCS I angina pectoris/NYHA class III-IV biventricular CHF, June 2021  q. Residual class I symptoms on limited activity with interim [facility] advanced heart failure evaluation and desire to avoid LVAD/transplant evaluation at this time, September 2021, January 2022  2. Peripheral vascular disease:  a. Cardiac catheterization revealing total occlusion of left internal carotid artery previously in 2011.  b. CT angiogram of the neck showing total occlusion of the left internal carotid artery, 50% to 60% stenosis of the right carotid bulb, stent in the brachiocephalic artery placed by Dr. Santamaria in November 2011, totally occluded, March 2013.  c. Occluded left carotid artery with occluded innominate artery and intracranial circulation via thyroid artery collaterals and left vertebral with left axillary to right axillary bypass graft using 8 mm. PTFE ringed Propaten graft with arteriogram to the right subclavian artery and right axillary artery and right carotid artery, April 2013.  d. Remote hospitalization, [facility], with congestive heart failure and stroke - data deficit (December 2017), with apparent acute injury noted.  3. Cerebrovascular accident and initiation of warfarin therapy, March 2013.  4. Atrial fibrillation on presentation to outside hospital converted to sinus with Cardizem drip and subsequent recurrence of atrial fibrillation while in the ICU, treated with amiodarone and returned to sinus, August 2014, successful ECV, April 2021.  5. Dyslipidemia.  6. Stage 3 chronic kidney disease.   7. Left bundle branch block, probably combined ischemic/nonischemic cardiomyopathy.  8. Remote acute-on-chronic respiratory failure with hospital admission for pneumonia on ventilator.  9. History of hypertension.  10. Tobacco abuse, previously resolved but recurrent, with cessation using Chantix, winter of 2015, with recent discontinuation, autumn  2019  11. No prior additional surgical intervention.   12. Progressive erectile dysfunction.  13. Serial acceptable AICD interrogations: February 2014; August 2014; March 2015, July 2017, with home Merlin monitoring, with ICD generator change, March 2019  14. Hyperthyroidism with abnormal thyroid ultrasound documenting thyroid nodule-data deficit.  2017  15. [facility] hospitalization 10/9/2020-10/13/2020 for aspiration pneumonia requiring intubation  16.  [facility] overnight hospitalization December 2020 for non-STEMI  17. [facility] ED visit, June 2021, for fall in which he hit his head but did not lose consciousness. XR Chest and CT Head were abnormal but acceptable.    No Known Allergies    Current Outpatient Medications:   •  albuterol (PROVENTIL) (5 MG/ML) 0.5% nebulizer solution, Take 0.5 mL by nebulization Every 6 (Six) Hours As Needed for Wheezing., Disp: 1 mL, Rfl: 12  •  apixaban (ELIQUIS) 5 MG tablet tablet, Take 1 tablet by mouth Every 12 (Twelve) Hours. (Patient taking differently: Take 5 mg by mouth Daily.), Disp: 180 tablet, Rfl: 3  •  atorvastatin (LIPITOR) 80 MG tablet, TAKE 1 TABLET BY MOUTH EVERY NIGHT AT BEDTIME, Disp: 90 tablet, Rfl: 3  •  carvedilol (COREG) 6.25 MG tablet, Take 1 tablet by mouth 2 (Two) Times a Day With Meals., Disp: 180 tablet, Rfl: 3  •  Entresto 24-26 MG tablet, TAKE 1 TABLET BY MOUTH TWICE DAILY, Disp: 180 tablet, Rfl: 3  •  midodrine (PROAMATINE) 5 MG tablet, Take 1 tablet by mouth 3 (Three) Times a Day Before Meals., Disp: 270 tablet, Rfl: 3  •  nitroglycerin (NITROSTAT) 0.4 MG SL tablet, 1 under the tongue as needed for angina, may repeat q5mins for up three doses, Disp: 100 tablet, Rfl: 11  •  potassium chloride (K-DUR,KLOR-CON) 20 MEQ CR tablet, take 1 tablet by mouth once daily (Patient taking differently: Take 20 mEq by mouth Daily. Do not crush. Take with food.), Disp: 30 tablet, Rfl: 5  •  ranolazine (RANEXA) 500  "MG 12 hr tablet, TAKE 1 TABLET BY MOUTH TWICE DAILY, Disp: 60 tablet, Rfl: 6  •  spironolactone (ALDACTONE) 25 MG tablet, TAKE 1 TABLET BY MOUTH DAILY, Disp: 90 tablet, Rfl: 1  •  torsemide (DEMADEX) 20 MG tablet, TAKE 1 TABLET BY MOUTH DAILY, Disp: 90 tablet, Rfl: 1  •  Ventolin  (90 Base) MCG/ACT inhaler, Inhale 2 puffs Every 6 (Six) Hours As Needed for Wheezing. Further refills with PCP, Disp: 18 g, Rfl: 1    History of Present Illness: Patient returns for scheduled 4-month follow up. He has been feeling well overall from a cardiovascular standpoint. He will occasionally feel chest congestion with associated shortness of breath. He uses a nebulizer 2-3 times per week which improves his breathing. He uses nitroglycerin approximately once weekly when he is significantly short of breath and his symptoms resolve shortly after taking nitroglycerin. Patient denies chest pain, palpitations, edema, dizziness, and syncope. His son pulled his tooth at home today and he continues to have significant dental decay. He did not have bleeding with this. He has had no interim ER visits, hospitalizations, serious illnesses, or surgeries. He has received COVID immunization. He and his wife have relocated to [place]. He does not have portable oxygen to use while he is out but sleeps well with oxygen at night.      ROS   Obtained and negative except as outlined in problem list and HPI.    Procedures       Objective:       Vitals:    01/28/22 1312 01/28/22 1316   BP: 97/63 106/68   BP Location: Left arm Left arm   Patient Position: Sitting Standing   Pulse: 72 89   SpO2: 98%    Weight: 73.8 kg (162 lb 12.8 oz)    Height: 177.8 cm (70\")      Body mass index is 23.36 kg/m².  Last weight: 165 lbs    Vitals reviewed.   Constitutional:       Appearance: Well-developed.   Neck:      Thyroid: No thyromegaly.      Vascular: No carotid bruit or JVD.      Lymphadenopathy: No cervical adenopathy.   Pulmonary:      Effort: Pulmonary effort " is normal.      Breath sounds: Decreased breath sounds present. Wheezing (scattered) present. Rhonchi (scattered) present. No rales.   Chest:      Comments: Left precordial PCD site is nominal.  Cardiovascular:      Regular rhythm.      Murmurs: There is a grade 2/6 mid frequency early systolic murmur at the LLSB.      No gallop. No S3 gallop.   Pulses:     Carotid: with bruit bilaterally.     Dorsalis pedis: 1+ bilaterally.     Posterior tibial: 1+ bilaterally.  Edema:     Pretibial: bilateral trace edema of the pretibial area.     Ankle: bilateral trace edema of the ankle.  Abdominal:      Palpations: Abdomen is soft. There is no abdominal mass.      Tenderness: There is no abdominal tenderness.   Musculoskeletal: Normal range of motion. Skin:     General: Skin is warm and dry.      Findings: No rash.   Neurological:      Mental Status: Alert and oriented to person, place, and time.           Lab Review:   Lab Results   Component Value Date    GLUCOSE 107 (H) 09/20/2021    BUN 30 (H) 09/20/2021    CREATININE 1.61 (H) 09/20/2021    EGFRIFNONA 44 (L) 09/20/2021    EGFRIFAFRI 51 (L) 09/20/2021    BCR 19 09/20/2021     09/20/2021    K 4.5 09/20/2021    CL 99 09/20/2021    MG 2.3 06/24/2021    CO2 25 09/20/2021    CALCIUM 9.0 09/20/2021    PROTENTOTREF 6.3 09/20/2021    ALBUMIN 4.1 09/20/2021    LABIL2 1.9 09/20/2021    AST 16 09/20/2021    ALT 13 09/20/2021       Lab Results   Component Value Date    WBC 8.42 06/24/2021    HGB 14.4 06/24/2021    HCT 44.1 06/24/2021    MCV 88.0 06/24/2021     06/24/2021       Lab Results   Component Value Date    TSH 2.450 06/24/2021     MURJ, 11/10/2021:  Normal device function  1 high atrial rate event  Battery is at 50% (2.67 years)  Sensing, impedance, and thresholds reviewed        Assessment:       Overall continued acceptable course with no new interim cardiopulmonary complaints with acceptable functional status. We will defer additional diagnostic or therapeutic  intervention from a cardiac perspective at this time. We will prescribe him portable oxygen.     Diagnosis Plan   1. Ischemic heart disease  No recurrent angina pectoris or CHF on current activity schedule; continue current treatment   2. Ischemic cardiomyopathy  No recurrent angina pectoris or CHF on current activity schedule; continue current treatment   3. PAF (paroxysmal atrial fibrillation) (HCC)  Acceptable MURJ assessment, November 2021. No documented recurrence. Continue current treatment.   4. Essential hypertension  Well controlled. Continue current treatment.   5. Dyslipidemia  No data to review. Continue atorvastatin.          Plan:         1. Patient to continue current medications and close follow up with the above providers.  2. Prescription for portable oxygen with [employer]  3. The following laboratory studies are ordered to be completed today:  A. BMP  B. Magnesium level  4. Tentative cardiology follow up in June 2022 or patient may return sooner PRN.    I, Emile Green, attest that this documentation has been prepared under the direction and in the presence of Andrea Guevara MD 01/28/2022    IAndrea MD, [facility], personally performed the services described in this documentation as scribed by the above named individual in my presence, and it is both accurate and complete. At 13:37 EST on 01/28/2022

## 2022-01-29 LAB
BUN SERPL-MCNC: 22 MG/DL (ref 8–23)
BUN/CREAT SERPL: 12.8 (ref 7–25)
CALCIUM SERPL-MCNC: 9.8 MG/DL (ref 8.6–10.5)
CHLORIDE SERPL-SCNC: 100 MMOL/L (ref 98–107)
CO2 SERPL-SCNC: 29.1 MMOL/L (ref 22–29)
CREAT SERPL-MCNC: 1.72 MG/DL (ref 0.76–1.27)
GLUCOSE SERPL-MCNC: 106 MG/DL (ref 65–99)
MAGNESIUM SERPL-MCNC: 2.4 MG/DL (ref 1.6–2.4)
POTASSIUM SERPL-SCNC: 5.3 MMOL/L (ref 3.5–5.2)
SODIUM SERPL-SCNC: 140 MMOL/L (ref 136–145)

## 2022-02-17 RX ORDER — TORSEMIDE 20 MG/1
20 TABLET ORAL DAILY
Qty: 90 TABLET | Refills: 1 | Status: SHIPPED | OUTPATIENT
Start: 2022-02-17 | End: 2022-07-22 | Stop reason: SDUPTHER

## 2022-02-22 PROCEDURE — 93295 DEV INTERROG REMOTE 1/2/MLT: CPT | Performed by: STUDENT IN AN ORGANIZED HEALTH CARE EDUCATION/TRAINING PROGRAM

## 2022-02-22 PROCEDURE — 93296 REM INTERROG EVL PM/IDS: CPT | Performed by: STUDENT IN AN ORGANIZED HEALTH CARE EDUCATION/TRAINING PROGRAM

## 2022-02-23 RX ORDER — NITROGLYCERIN 0.4 MG/1
TABLET SUBLINGUAL
Qty: 50 TABLET | Refills: 11 | Status: SHIPPED | OUTPATIENT
Start: 2022-02-23

## 2022-05-02 RX ORDER — CARVEDILOL 6.25 MG/1
TABLET ORAL
Qty: 180 TABLET | Refills: 3 | Status: ON HOLD | OUTPATIENT
Start: 2022-05-02 | End: 2022-11-12

## 2022-05-02 RX ORDER — SPIRONOLACTONE 25 MG/1
25 TABLET ORAL DAILY
Qty: 90 TABLET | Refills: 1 | Status: ON HOLD | OUTPATIENT
Start: 2022-05-02 | End: 2022-11-12

## 2022-05-11 ENCOUNTER — TELEPHONE (OUTPATIENT)
Dept: CARDIOLOGY | Facility: CLINIC | Age: 67
End: 2022-05-11

## 2022-05-11 NOTE — TELEPHONE ENCOUNTER
I left a message for the patient to let them know that their scheduled remote device transmission did not come through.  I requested they send in a manual transmission and left my call back number if they need assistance in doing so.

## 2022-05-24 DIAGNOSIS — I25.709 CORONARY ARTERY DISEASE INVOLVING CORONARY BYPASS GRAFT OF NATIVE HEART WITH ANGINA PECTORIS: ICD-10-CM

## 2022-05-24 PROCEDURE — 93296 REM INTERROG EVL PM/IDS: CPT | Performed by: STUDENT IN AN ORGANIZED HEALTH CARE EDUCATION/TRAINING PROGRAM

## 2022-05-24 PROCEDURE — 93295 DEV INTERROG REMOTE 1/2/MLT: CPT | Performed by: STUDENT IN AN ORGANIZED HEALTH CARE EDUCATION/TRAINING PROGRAM

## 2022-05-25 DIAGNOSIS — I25.709 CORONARY ARTERY DISEASE INVOLVING CORONARY BYPASS GRAFT OF NATIVE HEART WITH ANGINA PECTORIS: ICD-10-CM

## 2022-05-25 RX ORDER — RANOLAZINE 500 MG/1
500 TABLET, EXTENDED RELEASE ORAL 2 TIMES DAILY
Qty: 180 TABLET | Refills: 3 | Status: SHIPPED | OUTPATIENT
Start: 2022-05-25

## 2022-05-25 RX ORDER — RANOLAZINE 500 MG/1
TABLET, EXTENDED RELEASE ORAL
Qty: 60 TABLET | Refills: 6 | OUTPATIENT
Start: 2022-05-25

## 2022-06-16 ENCOUNTER — TELEPHONE (OUTPATIENT)
Dept: CARDIOLOGY | Facility: CLINIC | Age: 67
End: 2022-06-16

## 2022-06-16 DIAGNOSIS — I10 ESSENTIAL HYPERTENSION: ICD-10-CM

## 2022-06-16 DIAGNOSIS — I25.10 CORONARY ARTERY DISEASE INVOLVING NATIVE CORONARY ARTERY OF NATIVE HEART WITHOUT ANGINA PECTORIS: Primary | ICD-10-CM

## 2022-06-16 NOTE — TELEPHONE ENCOUNTER
Patient's friend called regarding needing lab work for upcoming appt 6/29/22. Pt had lab work 1/28/22:    Magnesium 2.4    Lab Results   Component Value Date    GLUCOSE 106 (H) 01/28/2022    BUN 22 01/28/2022    CREATININE 1.72 (H) 01/28/2022    EGFRIFNONA 40 (L) 01/28/2022    EGFRIFAFRI 48 (L) 01/28/2022    BCR 12.8 01/28/2022    K 5.3 (H) 01/28/2022    CO2 29.1 (H) 01/28/2022    CALCIUM 9.8 01/28/2022    PROTENTOTREF 6.3 09/20/2021    ALBUMIN 4.1 09/20/2021    LABIL2 1.9 09/20/2021    AST 16 09/20/2021    ALT 13 09/20/2021     Would you like to order repeat testing? Any other labs?    Please advise.

## 2022-06-16 NOTE — TELEPHONE ENCOUNTER
Orders placed. Called pt's friend Hannah, no answer. LVM asking for return call with fax# to send lab orders. Awaiting return call.

## 2022-06-17 ENCOUNTER — TELEPHONE (OUTPATIENT)
Dept: FAMILY MEDICINE CLINIC | Facility: CLINIC | Age: 67
End: 2022-06-17

## 2022-06-17 NOTE — TELEPHONE ENCOUNTER
Fax sent by [facility] Dr. Guevara's office for lab work that needs to be done. Labs were entered and are available for draw.

## 2022-06-21 ENCOUNTER — LAB (OUTPATIENT)
Dept: LAB | Facility: HOSPITAL | Age: 67
End: 2022-06-21

## 2022-06-21 DIAGNOSIS — Z76.89 ENCOUNTER TO ESTABLISH CARE: ICD-10-CM

## 2022-06-21 DIAGNOSIS — R73.9 BLOOD GLUCOSE ELEVATED: ICD-10-CM

## 2022-06-21 DIAGNOSIS — I25.10 CORONARY ARTERY DISEASE INVOLVING NATIVE CORONARY ARTERY OF NATIVE HEART WITHOUT ANGINA PECTORIS: ICD-10-CM

## 2022-06-21 DIAGNOSIS — I10 ESSENTIAL HYPERTENSION: ICD-10-CM

## 2022-06-21 DIAGNOSIS — E78.5 HYPERLIPIDEMIA, UNSPECIFIED HYPERLIPIDEMIA TYPE: ICD-10-CM

## 2022-06-21 DIAGNOSIS — I50.9 CONGESTIVE HEART FAILURE, UNSPECIFIED HF CHRONICITY, UNSPECIFIED HEART FAILURE TYPE: ICD-10-CM

## 2022-06-21 PROCEDURE — 83036 HEMOGLOBIN GLYCOSYLATED A1C: CPT

## 2022-06-21 PROCEDURE — 84443 ASSAY THYROID STIM HORMONE: CPT

## 2022-06-21 PROCEDURE — 80061 LIPID PANEL: CPT

## 2022-06-21 PROCEDURE — 83880 ASSAY OF NATRIURETIC PEPTIDE: CPT

## 2022-06-21 PROCEDURE — 83735 ASSAY OF MAGNESIUM: CPT

## 2022-06-21 PROCEDURE — 85025 COMPLETE CBC W/AUTO DIFF WBC: CPT

## 2022-06-21 PROCEDURE — 80053 COMPREHEN METABOLIC PANEL: CPT

## 2022-06-21 PROCEDURE — 36415 COLL VENOUS BLD VENIPUNCTURE: CPT

## 2022-06-21 PROCEDURE — 82043 UR ALBUMIN QUANTITATIVE: CPT

## 2022-06-22 ENCOUNTER — TELEPHONE (OUTPATIENT)
Dept: FAMILY MEDICINE CLINIC | Facility: CLINIC | Age: 67
End: 2022-06-22

## 2022-06-22 LAB
ALBUMIN SERPL-MCNC: 4.5 G/DL (ref 3.5–5.2)
ALBUMIN UR-MCNC: 1.8 MG/DL
ALBUMIN/GLOB SERPL: 1.6 G/DL
ALP SERPL-CCNC: 96 U/L (ref 39–117)
ALT SERPL W P-5'-P-CCNC: 9 U/L (ref 1–41)
ANION GAP SERPL CALCULATED.3IONS-SCNC: 14 MMOL/L (ref 5–15)
AST SERPL-CCNC: 13 U/L (ref 1–40)
BASOPHILS # BLD AUTO: 0.13 10*3/MM3 (ref 0–0.2)
BASOPHILS NFR BLD AUTO: 1.7 % (ref 0–1.5)
BILIRUB SERPL-MCNC: 0.8 MG/DL (ref 0–1.2)
BUN SERPL-MCNC: 32 MG/DL (ref 8–23)
BUN/CREAT SERPL: 17.4 (ref 7–25)
CALCIUM SPEC-SCNC: 9.6 MG/DL (ref 8.6–10.5)
CHLORIDE SERPL-SCNC: 99 MMOL/L (ref 98–107)
CHOLEST SERPL-MCNC: 110 MG/DL (ref 0–200)
CO2 SERPL-SCNC: 23 MMOL/L (ref 22–29)
CREAT SERPL-MCNC: 1.84 MG/DL (ref 0.76–1.27)
DEPRECATED RDW RBC AUTO: 46.1 FL (ref 37–54)
EGFRCR SERPLBLD CKD-EPI 2021: 39.7 ML/MIN/1.73
EOSINOPHIL # BLD AUTO: 0.25 10*3/MM3 (ref 0–0.4)
EOSINOPHIL NFR BLD AUTO: 3.2 % (ref 0.3–6.2)
ERYTHROCYTE [DISTWIDTH] IN BLOOD BY AUTOMATED COUNT: 13.1 % (ref 12.3–15.4)
GLOBULIN UR ELPH-MCNC: 2.8 GM/DL
GLUCOSE SERPL-MCNC: 99 MG/DL (ref 65–99)
HBA1C MFR BLD: 5.7 % (ref 4.8–5.6)
HCT VFR BLD AUTO: 44.3 % (ref 37.5–51)
HDLC SERPL-MCNC: 50 MG/DL (ref 40–60)
HGB BLD-MCNC: 14.9 G/DL (ref 13–17.7)
IMM GRANULOCYTES # BLD AUTO: 0.01 10*3/MM3 (ref 0–0.05)
IMM GRANULOCYTES NFR BLD AUTO: 0.1 % (ref 0–0.5)
LDLC SERPL CALC-MCNC: 45 MG/DL (ref 0–100)
LDLC/HDLC SERPL: 0.92 {RATIO}
LYMPHOCYTES # BLD AUTO: 1.79 10*3/MM3 (ref 0.7–3.1)
LYMPHOCYTES NFR BLD AUTO: 23.2 % (ref 19.6–45.3)
MAGNESIUM SERPL-MCNC: 2.4 MG/DL (ref 1.6–2.4)
MCH RBC QN AUTO: 32.1 PG (ref 26.6–33)
MCHC RBC AUTO-ENTMCNC: 33.6 G/DL (ref 31.5–35.7)
MCV RBC AUTO: 95.5 FL (ref 79–97)
MONOCYTES # BLD AUTO: 0.51 10*3/MM3 (ref 0.1–0.9)
MONOCYTES NFR BLD AUTO: 6.6 % (ref 5–12)
NEUTROPHILS NFR BLD AUTO: 5.02 10*3/MM3 (ref 1.7–7)
NEUTROPHILS NFR BLD AUTO: 65.2 % (ref 42.7–76)
NRBC BLD AUTO-RTO: 0 /100 WBC (ref 0–0.2)
NT-PROBNP SERPL-MCNC: 5249 PG/ML (ref 0–900)
PLATELET # BLD AUTO: 164 10*3/MM3 (ref 140–450)
PMV BLD AUTO: 10.4 FL (ref 6–12)
POTASSIUM SERPL-SCNC: 4.9 MMOL/L (ref 3.5–5.2)
PROT SERPL-MCNC: 7.3 G/DL (ref 6–8.5)
RBC # BLD AUTO: 4.64 10*6/MM3 (ref 4.14–5.8)
SODIUM SERPL-SCNC: 136 MMOL/L (ref 136–145)
TRIGL SERPL-MCNC: 70 MG/DL (ref 0–150)
TSH SERPL DL<=0.05 MIU/L-ACNC: 2.47 UIU/ML (ref 0.27–4.2)
VLDLC SERPL-MCNC: 15 MG/DL (ref 5–40)
WBC NRBC COR # BLD: 7.71 10*3/MM3 (ref 3.4–10.8)

## 2022-06-22 NOTE — TELEPHONE ENCOUNTER
Please fax results for Reza Mcclelland (: [birth year]) to Dr Guevara and call his nurse to make sure they are addressed.

## 2022-06-28 NOTE — PROGRESS NOTES
Subjective:     Encounter Date:06/29/2022    Patient ID: Reza Mcclelland is a 67 y.o. single white male, retired [employer] , from [place], [place].      FORMER PHYSICIAN: Rolanda Billingsley MD/EMILY Romo  CURRENT HEALTHCARE PROVIDER: DAKOTA Brown  NEUROLOGIST: Jonas Quiroz MD   CARDIOVASCULAR SURGEON: Guillaume Pruitt MD, [facility]  INTERVENTIONAL CARDIOLOGIST: Ismael Nguyen MD, [facility]/ Oliverio Lemus MD, [facility], [facility]/Guillaume Shore MD, [facility]  FORMER ELECTROPHYSIOLOGIST: Reza Lopes MD, [facility], [facility]  SCHEDULED ELECTROPHYSIOLOGIST: Chaim Asher, [facility], [facility]: DAKOTA Peters  ENDOCRINOLOGIST: unknown  [facility] ADVANCED HEART FAILURE CARDIOLOGIST: Yuridia Mock MD    Chief Complaint:   Chief Complaint   Patient presents with   • Ischemic heart disease   • Cardiomyopathy       Problem List:  1. Ischemic heart disease/ischemic cardiomyopathy:  a. Remote non-ST-elevation myocardial infarction with emergent left heart catheterization and successful percutaneous transluminal coronary angioplasty and stenting of the ramus intermedius with a 3.0 x 12 mm Xience drug-eluting stent, 11/19/2008.  b. Left heart catheterization revealing severe stenosis in the LAD of about 80% to 90% in the mid portion with 2.5 x 28 mm, 3.0 x 28 mm, and 3.5 x 18 mm Xience drug-eluting stents in the mid and proximal portion of the LAD, critical left anterior descending stenosis with recommendations to return in 2 weeks for further intervention, 12/04/2008.  c. Left heart catheterization revealing critical right coronary artery stenosis with 3.5 x 28 mm Xience drug-eluting stent deployed in the mid RCA and Xience drug-eluting stent deployed in the proximal RCA, also revealing severe peripheral vascular disease with critical bilateral stenosis of common iliac artery with recommendations for the patient to remain on Plavix and aspirin and further evaluation of  iliac stenosis, 12/18/2008.  d. Recurrent CCS class II-III angina with subsequent left heart catheterization and stenting with a 3.5 x 15 mm Xience drug-eluting stent to the proximal LAD and 2.5 x 18 mm Xience drug-eluting stent into the distal LAD with widely patent stents in the RCA, 90% lesion in the stent of the LAD and widely patent stent of the diagonal branch. LVEF (0.65) with no wall motion abnormalities. Severe peripheral arterial disease with 60% stenosis of the left common iliac artery and 80% to 90% stenosis in the distal common femoral artery with recommendations for abdominal aortogram with right and left lower extremity arteriogram, 01/21/2011.  e. Cardiac catheterization with percutaneous transluminal angioplasty and self-expanding stent placement to the proximal innominate artery, distal embolic protection filter placement in the right carotid artery for cerebral protection during procedure, 10 x 20 mm Xact self-expanding stent deployed to the proximal innominate stenosis, total occlusion of the proximal left internal carotid artery, 11/29/2011.  f. Markedly reduced echocardiographic LVEF (0.25) with left ventricular chamber enlargement and thickened mitral valve leaflets with moderate MR in the absence of pericardial effusion or intracavitary thrombus/mass with markedly abnormal EKG demonstrating LBBB with marked QRS widening, spring 2013.  g. Recurrent progressive chest pain syndrome with severe single-vessel obstructive coronary disease and 100% total occlusion proximal right coronary artery at site of previously placed stent with mild nonobstructive plaque in the left coronary artery system and previously placed stents in the LAD, ramus intermedius artery patent with left ventriculography deferred and moderate bilateral common iliac disease, April 2013, with subsequent bi-ventricular AICD implant ([employer], model #[id number]) by Dr. Kong.  h. Acute non-ST elevation myocardial  infarction/left heart catheterization, Dr. Lemus, with Xience drug-eluting stent to the ramus intermedius: Nonobstructive 50% to 60% mid to distal left anterior descending stenosis. Chronic total occlusion of the dominant right coronary artery served by left-sided collaterals with left ventricular systolic dysfunction. EF 10% to 15%, 08/20/2014.  i. Biventricular ICD interrogation 7/7/17; RA pacing 72%, RV pacing 96% longevity 1.8 years, mode switch less than 1%, longest was 7 hours 5/9/17, 1 noise reversion both channels  j. Residual CCS class I angina pectoris/NYHA class II-III exertional dyspnea and fatigue.   k. Remote apparent acute congestive heart failure with non-STEMI and possible bronchopneumonia with respiratory failure requiring intubation and apparent diagnostic coronary angiography with 3 stents placed with subsequent apparent AMA discharge - data deficit, [facility], [place], [place], November 2017.  l. Residual CCS class I angina pectoris/NYHA class III-IV biventricular CHF, December 2017.  m. Residual class I symptoms, June 2018, October 2018, July 2019 with progressive angina pectoris and abnormal IV Lexiscan Cardiolite study (lateral ischemia with severe reduction in LVEF 0.10) with [facility] demonstrating severe 3-vessel disease with intervention to mid-LCx, February 2020, with residual class I symptoms, March 2020, September 2020, January 2021.   n. Remote [employer] device check November 2020 demonstrated battery 68%, 1% atrial fib burden, 99% BiV pacing, 85% atrial pacing, with one episode of nonsustained V. Tach, January 2021 demonstrated battery 65%, 1% atrial fibrillation burden, 97% BiV, 82% atrial pacing.   o. Recent residual class 1 symptoms on limited activity with acceptable device interrogation, January 2021.  p. Echocardiogram, May 2021, with severely decreased LV systolic function (LVEF 0.21-0.25) and subsequent stress test indicating a large-sized infarct located  in the lateral wall, inferior apical wall and apex with no significant ischemia noted. (LVEF 0.22) (interpreted by Dr. Lemus) with residual CCS I angina pectoris/NYHA class III-IV biventricular CHF, June 2021  q. Residual class I symptoms on limited activity with interim [facility] advanced heart failure evaluation and desire to avoid LVAD/transplant evaluation at this time, September 2021, January 2022, June 2022  2. Peripheral vascular disease:  a. Cardiac catheterization revealing total occlusion of left internal carotid artery previously in 2011.  b. CT angiogram of the neck showing total occlusion of the left internal carotid artery, 50% to 60% stenosis of the right carotid bulb, stent in the brachiocephalic artery placed by Dr. Santamaria in November 2011, totally occluded, March 2013.  c. Occluded left carotid artery with occluded innominate artery and intracranial circulation via thyroid artery collaterals and left vertebral with left axillary to right axillary bypass graft using 8 mm. PTFE ringed Propaten graft with arteriogram to the right subclavian artery and right axillary artery and right carotid artery, April 2013.  d. Remote hospitalization, [facility] [place], with congestive heart failure and stroke - data deficit (December 2017), with apparent acute injury noted.  3. Cerebrovascular accident and initiation of warfarin therapy, March 2013.  4. Atrial fibrillation on presentation to outside hospital converted to sinus with Cardizem drip and subsequent recurrence of atrial fibrillation while in the ICU, treated with amiodarone and returned to sinus, August 2014, successful ECV, April 2021.  5. Dyslipidemia.  6. Stage 3 chronic kidney disease.   7. Left bundle branch block, probably combined ischemic/nonischemic cardiomyopathy.  8. Remote acute-on-chronic respiratory failure with hospital admission for pneumonia on ventilator.  9. History of hypertension.  10. Tobacco abuse, previously resolved  but recurrent, with cessation using Chantix, winter of 2015, with recent discontinuation, autumn 2019  11. No prior additional surgical intervention.   12. Progressive erectile dysfunction.  13. Serial acceptable AICD interrogations: February 2014; August 2014; March 2015, July 2017, with home Merlin monitoring, with ICD generator change, March 2019  14. Hyperthyroidism with abnormal thyroid ultrasound documenting thyroid nodule-data deficit.  2017  15. [facility] hospitalization 10/9/2020-10/13/2020 for aspiration pneumonia requiring intubation  16. [facility] overnight hospitalization December 2020 for non-STEMI  17. [facility] ED visit, June 2021, for fall in which he hit his head but did not lose consciousness. XR Chest and CT Head were abnormal but acceptable    No Known Allergies    Current Outpatient Medications:   •  albuterol (PROVENTIL) (5 MG/ML) 0.5% nebulizer solution, Take 0.5 mL by nebulization Every 6 (Six) Hours As Needed for Wheezing., Disp: 1 mL, Rfl: 12  •  apixaban (ELIQUIS) 5 MG tablet tablet, Take 1 tablet by mouth Every 12 (Twelve) Hours. (Patient taking differently: Take 5 mg by mouth Daily.), Disp: 180 tablet, Rfl: 3  •  atorvastatin (LIPITOR) 80 MG tablet, TAKE 1 TABLET BY MOUTH EVERY NIGHT AT BEDTIME, Disp: 90 tablet, Rfl: 3  •  carvedilol (COREG) 6.25 MG tablet, TAKE 1 TABLET BY MOUTH TWICE DAILY WITH MEALS, Disp: 180 tablet, Rfl: 3  •  Entresto 24-26 MG tablet, TAKE 1 TABLET BY MOUTH TWICE DAILY, Disp: 180 tablet, Rfl: 3  •  midodrine (PROAMATINE) 5 MG tablet, Take 1 tablet by mouth 3 (Three) Times a Day Before Meals., Disp: 270 tablet, Rfl: 3  •  nitroglycerin (NITROSTAT) 0.4 MG SL tablet, 1 under the tongue as needed for angina, may repeat q5mins for up three doses, Disp: 50 tablet, Rfl: 11  •  potassium chloride (K-DUR,KLOR-CON) 20 MEQ CR tablet, take 1 tablet by mouth once daily (Patient taking differently: Take 20 mEq by mouth  "Daily. Do not crush. Take with food.), Disp: 30 tablet, Rfl: 5  •  ranolazine (RANEXA) 500 MG 12 hr tablet, Take 1 tablet by mouth 2 (Two) Times a Day., Disp: 180 tablet, Rfl: 3  •  spironolactone (ALDACTONE) 25 MG tablet, TAKE 1 TABLET BY MOUTH DAILY, Disp: 90 tablet, Rfl: 1  •  torsemide (DEMADEX) 20 MG tablet, Take 1 tablet by mouth Daily., Disp: 90 tablet, Rfl: 1  •  Ventolin  (90 Base) MCG/ACT inhaler, Inhale 2 puffs Every 6 (Six) Hours As Needed for Wheezing. Further refills with PCP, Disp: 18 g, Rfl: 1    History of Present Illness: Patient returns for scheduled 5-month follow up. Patient's significant other reports that he has been having episodes where he will have a blank stare, then he will come back. She reports that this has been happening about once a day. He reports that recently, he was letting his dog out one night, and he was standing, then the next thing he knew, he was on the floor. He reports that when he falls, his eyes are still open, and he is unable to catch himself. He reports that there are deer that will roam through his yard. He is scheduled to see Dr. Asher on 06 September 2022. He has continued to stay away from smoking. His significant other reports that they will be traveling with his family to camp at [place] this summer. Patient denies chest pain, shortness of breath, orthopnea, palpitations, edema, dizziness, and additional episodes of syncope.  He has had no interim ER visits, hospitalizations, serious illnesses, or surgeries.  The patient did not bring his medications, or a list, and is unsure of his medications.    ROS   Obtained and negative except as outlined in problem list and HPI.    Procedures       Objective:       Vitals:    06/29/22 1320 06/29/22 1323   BP: 106/77 108/72   BP Location: Right arm Right arm   Patient Position: Sitting Standing   Pulse: 52 52   SpO2: 99% 99%   Weight: 67.8 kg (149 lb 6.4 oz) 68 kg (149 lb 14.4 oz)   Height: 177.8 cm (70\") 177.8 " "cm (70\")     Body mass index is 21.51 kg/m².  Last weight: 162 lbs    Vitals reviewed.   Constitutional:       Appearance: Well-developed.   Neck:      Thyroid: No thyromegaly.      Vascular: No carotid bruit or JVD.      Lymphadenopathy: No cervical adenopathy.   Pulmonary:      Effort: Pulmonary effort is normal.      Breath sounds: Decreased breath sounds present. No wheezing. No rhonchi. No rales.      Comments: Scattered rhonchi and wheezes.  Chest:      Comments: Left precordial PCD site nominal.  Cardiovascular:      Regular rhythm.      Murmurs: There is a grade 2/6 mid frequency early systolic murmur at the LLSB.      No gallop. No S3 gallop.   Pulses:     Carotid: with bruit bilaterally.     Dorsalis pedis: 1+ bilaterally.     Posterior tibial: 1+ bilaterally.  Abdominal:      Palpations: Abdomen is soft. There is no abdominal mass.      Tenderness: There is no abdominal tenderness.   Musculoskeletal: Normal range of motion. Skin:     General: Skin is warm and dry.      Findings: No rash.   Neurological:      Mental Status: Alert and oriented to person, place, and time.           Lab Review:   Lab Results   Component Value Date    GLUCOSE 99 06/21/2022    BUN 32 (H) 06/21/2022    CREATININE 1.84 (H) 06/21/2022    EGFRIFNONA 40 (L) 01/28/2022    EGFRIFAFRI 48 (L) 01/28/2022    BCR 17.4 06/21/2022     06/21/2022    K 4.9 06/21/2022    CL 99 06/21/2022    MG 2.4 06/21/2022    CO2 23.0 06/21/2022    CALCIUM 9.6 06/21/2022    PROTENTOTREF 6.3 09/20/2021    ALBUMIN 4.50 06/21/2022    LABIL2 1.9 09/20/2021    AST 13 06/21/2022    ALT 9 06/21/2022       Lab Results   Component Value Date    WBC 7.71 06/21/2022    HGB 14.9 06/21/2022    HCT 44.3 06/21/2022    MCV 95.5 06/21/2022     06/21/2022       Lab Results   Component Value Date    HGBA1C 5.70 (H) 06/21/2022       Lab Results   Component Value Date    TSH 2.470 06/21/2022       Lab Results   Component Value Date    CHOL 110 06/21/2022    CHOL 99 " 02/12/2020    CHOL 126 07/10/2019     Lab Results   Component Value Date    TRIG 70 06/21/2022    TRIG 73 02/12/2020    TRIG 59 07/10/2019     Lab Results   Component Value Date    HDL 50 06/21/2022    HDL 44 02/12/2020    HDL 47 07/10/2019     Lab Results   Component Value Date    LDL 45 06/21/2022    LDL 40 02/12/2020    LDL 67 07/10/2019     · MURJ, 13 May 2022  · [employer] [id number] Quadra Dori BE(TM)  · Normal Device Function  · Events or Alerts: 6- 2 episodes NSVT with longest episode 4 seconds and V-rate 167 and 179 BPM; 1 episode of noise  · Battery: Battery is at 42%, 2.17 yrs  · Sensing, impedance and thresholds reviewed  · Programmed parameters reviewed  · Presenting rhythm reviewed  · Heart Rate Histograms reviewed  · Implant Date 13 March 2019        Assessment:       Overall continued acceptable course with no new interim cardiopulmonary complaints with stable limited functional status. We will defer additional diagnostic or therapeutic intervention from a cardiac perspective at this time, other than to adjust his medication as outlined below. The patient does not wish to use a rolling walker or undergo physical therapy at this time. I doubt he is having cardiac arrhythmias precipitate his syncopal episodes.     Diagnosis Plan   1. Ischemic heart disease  No recurrent angina pectoris or CHF on current activity schedule; continue current treatment   2. Ischemic cardiomyopathy  Stable and asymptomatic. Continue current treatment.   3. PAF (paroxysmal atrial fibrillation) (HCC)  Stable and asymptomatic.   4. Essential hypertension  Excellent control. Continue current treatment.   5. Dyslipidemia  Well controlled. Continue current treatment, including heart healthy diet, exercise, and atorvastatin.          Plan:         1. Patient to continue current medications and close follow up with the above providers, and initiate knee high moderate compression hose, and initiate Florinef 0.1 mg  daily.  2. Patient is encouraged to implement a regular aerobic exercise routine, at least 30 minutes daily, 4-5 days per week.  3. Tentative cardiology follow up in October 2022 or patient may return sooner PRN.    Scribed for Andrea Guevara MD by Yumiko Oglesby. 6/29/2022  13:37 EDT     I, Andrea Guevara MD, [facility], personally performed the services described in this documentation as scribed by the above named individual in my presence, and it is both accurate and complete. At 13:45 EDT on 06/29/2022

## 2022-06-29 ENCOUNTER — OFFICE VISIT (OUTPATIENT)
Dept: CARDIOLOGY | Facility: CLINIC | Age: 67
End: 2022-06-29

## 2022-06-29 VITALS
DIASTOLIC BLOOD PRESSURE: 72 MMHG | WEIGHT: 149.9 LBS | HEIGHT: 70 IN | OXYGEN SATURATION: 99 % | SYSTOLIC BLOOD PRESSURE: 108 MMHG | HEART RATE: 52 BPM | BODY MASS INDEX: 21.46 KG/M2

## 2022-06-29 DIAGNOSIS — I10 ESSENTIAL HYPERTENSION: ICD-10-CM

## 2022-06-29 DIAGNOSIS — I48.0 PAF (PAROXYSMAL ATRIAL FIBRILLATION): ICD-10-CM

## 2022-06-29 DIAGNOSIS — I25.9 ISCHEMIC HEART DISEASE: Primary | ICD-10-CM

## 2022-06-29 DIAGNOSIS — E78.5 DYSLIPIDEMIA: ICD-10-CM

## 2022-06-29 DIAGNOSIS — I25.5 ISCHEMIC CARDIOMYOPATHY: ICD-10-CM

## 2022-06-29 PROCEDURE — 99214 OFFICE O/P EST MOD 30 MIN: CPT | Performed by: INTERNAL MEDICINE

## 2022-06-29 RX ORDER — FLUDROCORTISONE ACETATE 0.1 MG/1
0.1 TABLET ORAL DAILY
Qty: 90 TABLET | Refills: 3 | Status: SHIPPED | OUTPATIENT
Start: 2022-06-29

## 2022-07-15 ENCOUNTER — TELEPHONE (OUTPATIENT)
Dept: CARDIOLOGY | Facility: CLINIC | Age: 67
End: 2022-07-15

## 2022-07-15 NOTE — TELEPHONE ENCOUNTER
Patient's significant other called to update BP readings since adding Florinef 0.1 mg daily. Patient states that he feels much better, has more energy and no falls since LOV 6/29/22.      7/12 109/82 74   78/51 75  7/13 123/108 70     7/14 128/91 71   100/82    Pt denies chest pain, SOB, palpitations, swelling.     Please advise.

## 2022-07-22 RX ORDER — TORSEMIDE 20 MG/1
20 TABLET ORAL DAILY
Qty: 90 TABLET | Refills: 1 | Status: ON HOLD | OUTPATIENT
Start: 2022-07-22 | End: 2022-11-13 | Stop reason: SDUPTHER

## 2022-07-22 RX ORDER — MIDODRINE HYDROCHLORIDE 5 MG/1
5 TABLET ORAL
Qty: 270 TABLET | Refills: 3 | Status: SHIPPED | OUTPATIENT
Start: 2022-07-22

## 2022-07-26 RX ORDER — ALBUTEROL SULFATE 90 UG/1
2 AEROSOL, METERED RESPIRATORY (INHALATION) EVERY 6 HOURS PRN
Qty: 18 G | Refills: 11 | Status: SHIPPED | OUTPATIENT
Start: 2022-07-26

## 2022-08-15 RX ORDER — SACUBITRIL AND VALSARTAN 24; 26 MG/1; MG/1
TABLET, FILM COATED ORAL
Qty: 180 TABLET | Refills: 3 | Status: ON HOLD | OUTPATIENT
Start: 2022-08-15 | End: 2022-11-12

## 2022-08-30 RX ORDER — ATORVASTATIN CALCIUM 80 MG/1
80 TABLET, FILM COATED ORAL
Qty: 90 TABLET | Refills: 1 | Status: SHIPPED | OUTPATIENT
Start: 2022-08-30 | End: 2022-11-13 | Stop reason: HOSPADM

## 2022-09-06 NOTE — TELEPHONE ENCOUNTER
On 3/27/2020 I ordered a BMP to be completed in 1 to 2 weeks.  The lab request was mailed to patient.  I have not received results.  Please follow-up on results   None

## 2022-09-20 ENCOUNTER — OFFICE VISIT (OUTPATIENT)
Dept: CARDIOLOGY | Facility: CLINIC | Age: 67
End: 2022-09-20

## 2022-09-20 VITALS
SYSTOLIC BLOOD PRESSURE: 77 MMHG | RESPIRATION RATE: 16 BRPM | WEIGHT: 153.8 LBS | HEART RATE: 69 BPM | BODY MASS INDEX: 22.02 KG/M2 | HEIGHT: 70 IN | DIASTOLIC BLOOD PRESSURE: 44 MMHG

## 2022-09-20 DIAGNOSIS — I25.5 ISCHEMIC CARDIOMYOPATHY: Primary | ICD-10-CM

## 2022-09-20 DIAGNOSIS — I25.10 ASCVD (ARTERIOSCLEROTIC CARDIOVASCULAR DISEASE): ICD-10-CM

## 2022-09-20 PROCEDURE — 99213 OFFICE O/P EST LOW 20 MIN: CPT | Performed by: PHYSICIAN ASSISTANT

## 2022-09-20 PROCEDURE — 93000 ELECTROCARDIOGRAM COMPLETE: CPT | Performed by: PHYSICIAN ASSISTANT

## 2022-09-20 NOTE — PROGRESS NOTES
Anali Rogel APRN  Reza Hogueer  1955  09/20/2022    Patient Active Problem List   Diagnosis   • ASCVD (arteriosclerotic cardiovascular disease)   • Ischemic cardiomyopathy   • Peripheral vascular disease (HCC)   • Cerebrovascular accident (HCC)   • PAF (paroxysmal atrial fibrillation) (HCC)   • Hyperlipidemia LDL goal <70   • Chronic kidney disease (CKD), stage III (moderate) (HCC)   • Left bundle branch block   • Essential hypertension   • COPD   • Cardiac resynchronization therapy defibrillator (CRT-D) in place   • Carotid occlusion, left   • Leukocytosis   • Silent aspiration   • Dyslipidemia       Dear Anali Rogel APRN:    Subjective     History of Present Illness:    Chief Complaint   Patient presents with   • Establish Care     Transfer from Dr. Forrester   • Coronary Artery Disease     Stents s/p MI's   • Cardiomyopathy     S/p ICD/PP [employer] device   • Med Management     presented     Ischemic heart disease (history copied from previous cardiologist note, Dr. Bates)  1. Ischemic heart disease/ischemic cardiomyopathy:  a. Remote non-ST-elevation myocardial infarction with emergent left heart catheterization and successful percutaneous transluminal coronary angioplasty and stenting of the ramus intermedius with a 3.0 x 12 mm Xience drug-eluting stent, 11/19/2008.  b. Left heart catheterization revealing severe stenosis in the LAD of about 80% to 90% in the mid portion with 2.5 x 28 mm, 3.0 x 28 mm, and 3.5 x 18 mm Xience drug-eluting stents in the mid and proximal portion of the LAD, critical left anterior descending stenosis with recommendations to return in 2 weeks for further intervention, 12/04/2008.  c. Left heart catheterization revealing critical right coronary artery stenosis with 3.5 x 28 mm Xience drug-eluting stent deployed in the mid RCA and Xience drug-eluting stent deployed in the proximal RCA, also revealing severe peripheral vascular disease with critical bilateral stenosis of  common iliac artery with recommendations for the patient to remain on Plavix and aspirin and further evaluation of iliac stenosis, 12/18/2008.  d. Recurrent CCS class II-III angina with subsequent left heart catheterization and stenting with a 3.5 x 15 mm Xience drug-eluting stent to the proximal LAD and 2.5 x 18 mm Xience drug-eluting stent into the distal LAD with widely patent stents in the RCA, 90% lesion in the stent of the LAD and widely patent stent of the diagonal branch. LVEF (0.65) with no wall motion abnormalities. Severe peripheral arterial disease with 60% stenosis of the left common iliac artery and 80% to 90% stenosis in the distal common femoral artery with recommendations for abdominal aortogram with right and left lower extremity arteriogram, 01/21/2011.  e. Cardiac catheterization with percutaneous transluminal angioplasty and self-expanding stent placement to the proximal innominate artery, distal embolic protection filter placement in the right carotid artery for cerebral protection during procedure, 10 x 20 mm Xact self-expanding stent deployed to the proximal innominate stenosis, total occlusion of the proximal left internal carotid artery, 11/29/2011.  f. Markedly reduced echocardiographic LVEF (0.25) with left ventricular chamber enlargement and thickened mitral valve leaflets with moderate MR in the absence of pericardial effusion or intracavitary thrombus/mass with markedly abnormal EKG demonstrating LBBB with marked QRS widening, spring 2013.  g. Recurrent progressive chest pain syndrome with severe single-vessel obstructive coronary disease and 100% total occlusion proximal right coronary artery at site of previously placed stent with mild nonobstructive plaque in the left coronary artery system and previously placed stents in the LAD, ramus intermedius artery patent with left ventriculography deferred and moderate bilateral common iliac disease, April 2013, with subsequent  bi-ventricular AICD implant ([employer], model #[id number]) by Dr. Kong.  h. Acute non-ST elevation myocardial infarction/left heart catheterization, Dr. Lemus, with Xience drug-eluting stent to the ramus intermedius: Nonobstructive 50% to 60% mid to distal left anterior descending stenosis. Chronic total occlusion of the dominant right coronary artery served by left-sided collaterals with left ventricular systolic dysfunction. EF 10% to 15%, 08/20/2014.  i. Biventricular ICD interrogation 7/7/17; RA pacing 72%, RV pacing 96% longevity 1.8 years, mode switch less than 1%, longest was 7 hours 5/9/17, 1 noise reversion both channels  j. Residual CCS class I angina pectoris/NYHA class II-III exertional dyspnea and fatigue.   k. Remote apparent acute congestive heart failure with non-STEMI and possible bronchopneumonia with respiratory failure requiring intubation and apparent diagnostic coronary angiography with 3 stents placed with subsequent apparent AMA discharge - data deficit, [facility], [place], [place], November 2017.  l. Residual CCS class I angina pectoris/NYHA class III-IV biventricular CHF, December 2017.  m. Residual class I symptoms, June 2018, October 2018, July 2019 with progressive angina pectoris and abnormal IV Lexiscan Cardiolite study (lateral ischemia with severe reduction in LVEF 0.10) with [facility] demonstrating severe 3-vessel disease with intervention to mid-LCx, February 2020, with residual class I symptoms, March 2020, September 2020, January 2021.   n. Remote [employer] device check November 2020 demonstrated battery 68%, 1% atrial fib burden, 99% BiV pacing, 85% atrial pacing, with one episode of nonsustained V. Tach, January 2021 demonstrated battery 65%, 1% atrial fibrillation burden, 97% BiV, 82% atrial pacing.   o. Recent residual class 1 symptoms on limited activity with acceptable device interrogation, January 2021.  p. Echocardiogram, May 2021, with  severely decreased LV systolic function (LVEF 0.21-0.25) and subsequent stress test indicating a large-sized infarct located in the lateral wall, inferior apical wall and apex with no significant ischemia noted. (LVEF 0.22) (interpreted by Dr. Lemus) with residual CCS I angina pectoris/NYHA class III-IV biventricular CHF, June 2021  q. Residual class I symptoms on limited activity with interim [facility] advanced heart failure evaluation and desire to avoid LVAD/transplant evaluation at this time, September 2021, January 2022, June 2022  2. Peripheral vascular disease:  a. Cardiac catheterization revealing total occlusion of left internal carotid artery previously in 2011.  b. CT angiogram of the neck showing total occlusion of the left internal carotid artery, 50% to 60% stenosis of the right carotid bulb, stent in the brachiocephalic artery placed by Dr. Santamaria in November 2011, totally occluded, March 2013.  c. Occluded left carotid artery with occluded innominate artery and intracranial circulation via thyroid artery collaterals and left vertebral with left axillary to right axillary bypass graft using 8 mm. PTFE ringed Propaten graft with arteriogram to the right subclavian artery and right axillary artery and right carotid artery, April 2013.  d. Remote hospitalization, [facility] [place], with congestive heart failure and stroke - data deficit (December 2017), with apparent acute injury noted.      Reza Mcclelland is a pleasant 67 y.o. male with a past medical history significant for advanced atherosclerotic cardiovascular disease with areas of significant stenosis throughout all all major vascular beds with multiple stents throughout his coronary arteries, carotid artery stent, known 60% stenosis in the left common iliac artery and 80 to 90% in the distal common femoral artery in 2011.  Please see above regarding his exact detailed history taken from previous cardiologist note.  He does also have  history of a CVA and atrial fibrillation now fully anticoagulated with warfarin, CKD, dyslipidemia, history of tobacco abuse but quit in 2019.  He also has severe ischemic cardiomyopathy with chronic HFrEF with most recent LVEF of 20 to 25% (patient does have AICD.  He comes in today for cardiology follow-up.    Clinically Reza is doing fairly well given his advanced end-stage heart failure.  He does have chronic NYHA class III-IV dyspnea but is able to perform basic ADLs within his home.  He reports that this has been his baseline for well over a year previously following with Dr. Baptiste however apparently Dr. Guevara is no longer doing outpatient clinics and thus patient wishes to establish their cardiovascular care with our office.  He does currently deny any chest pains or other anginal type symptoms.  His blood pressure is recorded as significantly hypotensive however I suspect this is a false reading I did check his blood pressure in all 4 extremities 3 of which was unable to obtain a blood pressure with his left upper extremity revealing significantly low blood pressure however he is completely asymptomatic with this denying any syncope, near syncope, dizziness, or fatigue.  Given his diffuse severe atherosclerotic cardiovascular disease I suspect blood pressure is falsely low due to severe stenosis of his arterial system.    No Known Allergies:      Current Outpatient Medications:   •  albuterol (PROVENTIL) (5 MG/ML) 0.5% nebulizer solution, Take 0.5 mL by nebulization Every 6 (Six) Hours As Needed for Wheezing or Shortness of Air., Disp: 1 mL, Rfl: 11  •  apixaban (ELIQUIS) 5 MG tablet tablet, Take 1 tablet by mouth Every 12 (Twelve) Hours. (Patient taking differently: Take 5 mg by mouth Daily.), Disp: 180 tablet, Rfl: 3  •  atorvastatin (LIPITOR) 80 MG tablet, Take 1 tablet by mouth every night at bedtime., Disp: 90 tablet, Rfl: 1  •  carvedilol (COREG) 6.25 MG tablet, TAKE 1 TABLET BY MOUTH TWICE  DAILY WITH MEALS, Disp: 180 tablet, Rfl: 3  •  Entresto 24-26 MG tablet, TAKE 1 TABLET BY MOUTH TWICE DAILY, Disp: 180 tablet, Rfl: 3  •  fludrocortisone 0.1 MG tablet, Take 1 tablet by mouth Daily., Disp: 90 tablet, Rfl: 3  •  midodrine (PROAMATINE) 5 MG tablet, Take 1 tablet by mouth 3 (Three) Times a Day Before Meals., Disp: 270 tablet, Rfl: 3  •  nitroglycerin (NITROSTAT) 0.4 MG SL tablet, 1 under the tongue as needed for angina, may repeat q5mins for up three doses, Disp: 50 tablet, Rfl: 11  •  O2 (OXYGEN), Inhale 2 L/min As Needed., Disp: , Rfl:   •  ranolazine (RANEXA) 500 MG 12 hr tablet, Take 1 tablet by mouth 2 (Two) Times a Day., Disp: 180 tablet, Rfl: 3  •  spironolactone (ALDACTONE) 25 MG tablet, TAKE 1 TABLET BY MOUTH DAILY, Disp: 90 tablet, Rfl: 1  •  torsemide (DEMADEX) 20 MG tablet, Take 1 tablet by mouth Daily., Disp: 90 tablet, Rfl: 1  •  Ventolin  (90 Base) MCG/ACT inhaler, Inhale 2 puffs Every 6 (Six) Hours As Needed for Wheezing or Shortness of Air. Further refills with PCP, Disp: 18 g, Rfl: 11  •  potassium chloride (K-DUR,KLOR-CON) 20 MEQ CR tablet, take 1 tablet by mouth once daily (Patient taking differently: Take 20 mEq by mouth Daily. Do not crush. Take with food.), Disp: 30 tablet, Rfl: 5    The following portions of the patient's history were reviewed and updated as appropriate: allergies, current medications, past family history, past medical history, past social history, past surgical history and problem list.    Social History     Tobacco Use   • Smoking status: Former Smoker     Packs/day: 1.00     Years: 48.00     Pack years: 48.00     Types: Cigarettes     Quit date: 2017     Years since quittin.8   • Smokeless tobacco: Never Used   • Tobacco comment: Pt quit smoking 6-7 months ago   Vaping Use   • Vaping Use: Never used   Substance Use Topics   • Alcohol use: No   • Drug use: No         Objective   Vitals:    22 1111 22 1203   BP: (!) 67/53 (!) 77/44  "  Patient Position:  Sitting   Pulse: 69    Resp: 16    Weight: 69.8 kg (153 lb 12.8 oz)    Height: 177.8 cm (70\")      Body mass index is 22.07 kg/m².    Constitutional:       General: Not in acute distress.     Appearance: Healthy appearance. Well-developed and not in distress. Not diaphoretic.   Eyes:      Conjunctiva/sclera: Conjunctivae normal.      Pupils: Pupils are equal, round, and reactive to light.   HENT:      Head: Normocephalic and atraumatic.   Neck:      Vascular: No carotid bruit or JVD.   Pulmonary:      Effort: Pulmonary effort is normal. No respiratory distress.      Breath sounds: Normal breath sounds.   Cardiovascular:      Normal rate. Regular rhythm.   Skin:     General: Skin is cool.   Neurological:      Mental Status: Alert, oriented to person, place, and time and oriented to person, place and time.         Lab Results   Component Value Date     06/21/2022    K 4.9 06/21/2022    CL 99 06/21/2022    CO2 23.0 06/21/2022    BUN 32 (H) 06/21/2022    CREATININE 1.84 (H) 06/21/2022    GLUCOSE 99 06/21/2022    CALCIUM 9.6 06/21/2022    AST 13 06/21/2022    ALT 9 06/21/2022    ALKPHOS 96 06/21/2022    LABIL2 1.9 09/20/2021     No results found for: CKTOTAL  Lab Results   Component Value Date    WBC 7.71 06/21/2022    HGB 14.9 06/21/2022    HCT 44.3 06/21/2022     06/21/2022     Lab Results   Component Value Date    INR 2.32 (H) 06/24/2021    INR 1.14 12/15/2017    INR 1.05 06/29/2014     Lab Results   Component Value Date    MG 2.4 06/21/2022     Lab Results   Component Value Date    TSH 2.470 06/21/2022    CHLPL 105 08/20/2014    TRIG 70 06/21/2022    HDL 50 06/21/2022    LDL 45 06/21/2022      Lab Results   Component Value Date    .0 (H) 10/10/2018       During this visit the following were done:  Labs Reviewed []    Labs Ordered []    Radiology Reports Reviewed []    Radiology Ordered []    PCP Records Reviewed []    Referring Provider Records Reviewed []    ER Records " Reviewed []    Hospital Records Reviewed []    History Obtained From Family []    Radiology Images Reviewed []    Other Reviewed []    Records Requested []         ECG 12 Lead    Date/Time: 9/20/2022 11:20 AM  Performed by: Arvin Sheikh PA-C  Authorized by: Arvin Sheikh PA-C   Comparison: compared with previous ECG   Similar to previous ECG  Rhythm: sinus rhythm  Pacing: dual chamber pacing and 100% capture  Clinical impression: non-specific ECG            Assessment & Plan    Diagnosis Plan   1. Ischemic cardiomyopathy     2. ASCVD (arteriosclerotic cardiovascular disease)              Recommendations:  1. Falsely low blood pressure  1. Patient's blood pressure is recorded as significantly hypotensive (he and his wife states that his BP has always been significantly hypotensive and difficult to obtain at times).  However he is completely asymptomatic denies any dizziness, syncope, or near syncope.  Both he and his wife who is with him today actually reports he is doing better than he has in roughly the last year.  He denies any recent falls.  As mentioned above in my note he does have systemic severe atherosclerotic cardiovascular disease. I suspect his blood pressure is likely not hypotensive at all but due to his severe ASCVD his blood pressure measurements are inaccurate due to arterial stenosis.   2.  I did discuss this with Dr. Alicea who did agree given patient's lack of symptoms and known ASCVD.  I will be continuing his current regimen with carvedilol, Entresto, Ranexa, spironolactone, and torsemide.  (Which he has tolerated for years).  2. Severe ischemic cardiomyopathy  1. Patient does appear euvolemic today denies any recent worsening of his shortness of breath or orthopnea.  Tolerating therapy well for now we will continue current regimen.      No follow-ups on file.    As always, I appreciate very much the opportunity to participate in the cardiovascular care of your patients.      With Best  Regards,    Arvin Sheikh PA-C

## 2022-10-12 ENCOUNTER — TELEPHONE (OUTPATIENT)
Dept: FAMILY MEDICINE CLINIC | Facility: CLINIC | Age: 67
End: 2022-10-12

## 2022-10-12 NOTE — TELEPHONE ENCOUNTER
Caller: Reza Mcclelland    Relationship to patient: Self    Best call back number: [phone]    Patient is needing: A NEW NEBULIZER, HIS BROKE. [facility] IS WHO THE PRESCRIPTION IS SUPPOSED TO GO TO. IF THERE ARE ANY CONCERNS PLEASE CALL TO ADVISE.     [phone] FAX  [phone] PHONE

## 2022-10-13 ENCOUNTER — CLINICAL SUPPORT NO REQUIREMENTS (OUTPATIENT)
Dept: CARDIOLOGY | Facility: CLINIC | Age: 67
End: 2022-10-13

## 2022-10-13 DIAGNOSIS — I42.9 CARDIOMYOPATHY, UNSPECIFIED TYPE: Primary | ICD-10-CM

## 2022-10-13 PROCEDURE — 93282 PRGRMG EVAL IMPLANTABLE DFB: CPT | Performed by: INTERNAL MEDICINE

## 2022-11-11 ENCOUNTER — APPOINTMENT (OUTPATIENT)
Dept: CT IMAGING | Facility: HOSPITAL | Age: 67
End: 2022-11-11

## 2022-11-11 ENCOUNTER — HOSPITAL ENCOUNTER (INPATIENT)
Facility: HOSPITAL | Age: 67
LOS: 2 days | Discharge: HOSPICE | End: 2022-11-13
Attending: STUDENT IN AN ORGANIZED HEALTH CARE EDUCATION/TRAINING PROGRAM | Admitting: HOSPITALIST

## 2022-11-11 ENCOUNTER — APPOINTMENT (OUTPATIENT)
Dept: ULTRASOUND IMAGING | Facility: HOSPITAL | Age: 67
End: 2022-11-11

## 2022-11-11 ENCOUNTER — TELEPHONE (OUTPATIENT)
Dept: CARDIOLOGY | Facility: CLINIC | Age: 67
End: 2022-11-11

## 2022-11-11 ENCOUNTER — APPOINTMENT (OUTPATIENT)
Dept: GENERAL RADIOLOGY | Facility: HOSPITAL | Age: 67
End: 2022-11-11

## 2022-11-11 DIAGNOSIS — I50.23 ACUTE ON CHRONIC HFREF (HEART FAILURE WITH REDUCED EJECTION FRACTION): ICD-10-CM

## 2022-11-11 DIAGNOSIS — I50.9 ACUTE ON CHRONIC CONGESTIVE HEART FAILURE, UNSPECIFIED HEART FAILURE TYPE: Primary | ICD-10-CM

## 2022-11-11 LAB
A-A DO2: 150.8 MMHG (ref 0–300)
A-A DO2: 40.5 MMHG (ref 0–300)
ALBUMIN SERPL-MCNC: 4.25 G/DL (ref 3.5–5.2)
ALBUMIN/GLOB SERPL: 1.5 G/DL
ALP SERPL-CCNC: 127 U/L (ref 39–117)
ALT SERPL W P-5'-P-CCNC: 6 U/L (ref 1–41)
AMPHET+METHAMPHET UR QL: NEGATIVE
AMPHETAMINES UR QL: NEGATIVE
ANION GAP SERPL CALCULATED.3IONS-SCNC: 11.7 MMOL/L (ref 5–15)
ARTERIAL PATENCY WRIST A: POSITIVE
ARTERIAL PATENCY WRIST A: POSITIVE
AST SERPL-CCNC: 8 U/L (ref 1–40)
ATMOSPHERIC PRESS: 717 MMHG
ATMOSPHERIC PRESS: 722 MMHG
BARBITURATES UR QL SCN: NEGATIVE
BASE EXCESS BLDA CALC-SCNC: -0.5 MMOL/L (ref 0–2)
BASE EXCESS BLDA CALC-SCNC: 1.1 MMOL/L (ref 0–2)
BASOPHILS # BLD AUTO: 0.09 10*3/MM3 (ref 0–0.2)
BASOPHILS NFR BLD AUTO: 0.9 % (ref 0–1.5)
BDY SITE: ABNORMAL
BDY SITE: ABNORMAL
BENZODIAZ UR QL SCN: NEGATIVE
BILIRUB SERPL-MCNC: 1.2 MG/DL (ref 0–1.2)
BILIRUB UR QL STRIP: NEGATIVE
BODY TEMPERATURE: 0 C
BODY TEMPERATURE: 0 C
BUN SERPL-MCNC: 21 MG/DL (ref 8–23)
BUN/CREAT SERPL: 10.9 (ref 7–25)
BUPRENORPHINE SERPL-MCNC: NEGATIVE NG/ML
CALCIUM SPEC-SCNC: 9.4 MG/DL (ref 8.6–10.5)
CANNABINOIDS SERPL QL: NEGATIVE
CHLORIDE SERPL-SCNC: 98 MMOL/L (ref 98–107)
CLARITY UR: CLEAR
CO2 BLDA-SCNC: 23.9 MMOL/L (ref 22–33)
CO2 BLDA-SCNC: 26 MMOL/L (ref 22–33)
CO2 SERPL-SCNC: 27.3 MMOL/L (ref 22–29)
COCAINE UR QL: NEGATIVE
COHGB MFR BLD: 2.9 % (ref 0–5)
COHGB MFR BLD: 4.4 % (ref 0–5)
COLOR UR: YELLOW
CPAP: 5 CMH2O
CREAT SERPL-MCNC: 1.93 MG/DL (ref 0.76–1.27)
CRP SERPL-MCNC: 1.13 MG/DL (ref 0–0.5)
D-LACTATE SERPL-SCNC: 1.3 MMOL/L (ref 0.5–2)
DEPRECATED RDW RBC AUTO: 50.1 FL (ref 37–54)
EGFRCR SERPLBLD CKD-EPI 2021: 37.5 ML/MIN/1.73
EOSINOPHIL # BLD AUTO: 0.15 10*3/MM3 (ref 0–0.4)
EOSINOPHIL NFR BLD AUTO: 1.5 % (ref 0.3–6.2)
ERYTHROCYTE [DISTWIDTH] IN BLOOD BY AUTOMATED COUNT: 14.6 % (ref 12.3–15.4)
FLUAV RNA RESP QL NAA+PROBE: NOT DETECTED
FLUBV RNA RESP QL NAA+PROBE: NOT DETECTED
GLOBULIN UR ELPH-MCNC: 2.9 GM/DL
GLUCOSE SERPL-MCNC: 110 MG/DL (ref 65–99)
GLUCOSE UR STRIP-MCNC: NEGATIVE MG/DL
HCO3 BLDA-SCNC: 23 MMOL/L (ref 20–26)
HCO3 BLDA-SCNC: 24.7 MMOL/L (ref 20–26)
HCT VFR BLD AUTO: 35.8 % (ref 37.5–51)
HCT VFR BLD CALC: 37.9 % (ref 38–51)
HCT VFR BLD CALC: 38.4 % (ref 38–51)
HGB BLD-MCNC: 12.1 G/DL (ref 13–17.7)
HGB BLDA-MCNC: 12.4 G/DL (ref 14–18)
HGB BLDA-MCNC: 12.5 G/DL (ref 14–18)
HGB UR QL STRIP.AUTO: NEGATIVE
HOLD SPECIMEN: NORMAL
HOLD SPECIMEN: NORMAL
IMM GRANULOCYTES # BLD AUTO: 0.03 10*3/MM3 (ref 0–0.05)
IMM GRANULOCYTES NFR BLD AUTO: 0.3 % (ref 0–0.5)
INHALED O2 CONCENTRATION: 21 %
INHALED O2 CONCENTRATION: 40 %
KETONES UR QL STRIP: NEGATIVE
LEUKOCYTE ESTERASE UR QL STRIP.AUTO: NEGATIVE
LYMPHOCYTES # BLD AUTO: 1.77 10*3/MM3 (ref 0.7–3.1)
LYMPHOCYTES NFR BLD AUTO: 17.3 % (ref 19.6–45.3)
Lab: ABNORMAL
Lab: ABNORMAL
MAGNESIUM SERPL-MCNC: 2.3 MG/DL (ref 1.6–2.4)
MCH RBC QN AUTO: 32.3 PG (ref 26.6–33)
MCHC RBC AUTO-ENTMCNC: 33.8 G/DL (ref 31.5–35.7)
MCV RBC AUTO: 95.5 FL (ref 79–97)
METHADONE UR QL SCN: NEGATIVE
METHGB BLD QL: 0 % (ref 0–3)
METHGB BLD QL: 0.1 % (ref 0–3)
MODALITY: ABNORMAL
MODALITY: ABNORMAL
MONOCYTES # BLD AUTO: 0.76 10*3/MM3 (ref 0.1–0.9)
MONOCYTES NFR BLD AUTO: 7.4 % (ref 5–12)
MRSA DNA SPEC QL NAA+PROBE: NORMAL
NEUTROPHILS NFR BLD AUTO: 7.41 10*3/MM3 (ref 1.7–7)
NEUTROPHILS NFR BLD AUTO: 72.6 % (ref 42.7–76)
NITRITE UR QL STRIP: NEGATIVE
NOTE: ABNORMAL
NOTE: ABNORMAL
NRBC BLD AUTO-RTO: 0 /100 WBC (ref 0–0.2)
NT-PROBNP SERPL-MCNC: ABNORMAL PG/ML (ref 0–900)
OPIATES UR QL: NEGATIVE
OXYCODONE UR QL SCN: NEGATIVE
OXYHGB MFR BLDV: 91 % (ref 94–99)
OXYHGB MFR BLDV: 91.4 % (ref 94–99)
PCO2 BLDA: 28.1 MM HG (ref 35–45)
PCO2 BLDA: 42 MM HG (ref 35–45)
PCO2 TEMP ADJ BLD: ABNORMAL MM[HG]
PCO2 TEMP ADJ BLD: ABNORMAL MM[HG]
PCP UR QL SCN: NEGATIVE
PH BLDA: 7.38 PH UNITS (ref 7.35–7.45)
PH BLDA: 7.52 PH UNITS (ref 7.35–7.45)
PH UR STRIP.AUTO: 6 [PH] (ref 5–8)
PH, TEMP CORRECTED: ABNORMAL
PH, TEMP CORRECTED: ABNORMAL
PLATELET # BLD AUTO: 217 10*3/MM3 (ref 140–450)
PMV BLD AUTO: 10 FL (ref 6–12)
PO2 BLDA: 68.6 MM HG (ref 83–108)
PO2 BLDA: 73 MM HG (ref 83–108)
PO2 TEMP ADJ BLD: ABNORMAL MM[HG]
PO2 TEMP ADJ BLD: ABNORMAL MM[HG]
POTASSIUM SERPL-SCNC: 3.8 MMOL/L (ref 3.5–5.2)
PROCALCITONIN SERPL-MCNC: 0.08 NG/ML (ref 0–0.25)
PROPOXYPH UR QL: NEGATIVE
PROT SERPL-MCNC: 7.1 G/DL (ref 6–8.5)
PROT UR QL STRIP: NEGATIVE
RBC # BLD AUTO: 3.75 10*6/MM3 (ref 4.14–5.8)
SAO2 % BLDCOA: 93.7 % (ref 94–99)
SAO2 % BLDCOA: 95.7 % (ref 94–99)
SARS-COV-2 RNA RESP QL NAA+PROBE: NOT DETECTED
SODIUM SERPL-SCNC: 137 MMOL/L (ref 136–145)
SP GR UR STRIP: 1.01 (ref 1–1.03)
TRICYCLICS UR QL SCN: NEGATIVE
TROPONIN T SERPL-MCNC: 0.02 NG/ML (ref 0–0.03)
TROPONIN T SERPL-MCNC: 0.03 NG/ML (ref 0–0.03)
UROBILINOGEN UR QL STRIP: NORMAL
VENTILATOR MODE: ABNORMAL
VENTILATOR MODE: ABNORMAL
WBC NRBC COR # BLD: 10.21 10*3/MM3 (ref 3.4–10.8)
WHOLE BLOOD HOLD COAG: NORMAL
WHOLE BLOOD HOLD SPECIMEN: NORMAL

## 2022-11-11 PROCEDURE — 83735 ASSAY OF MAGNESIUM: CPT | Performed by: PHYSICIAN ASSISTANT

## 2022-11-11 PROCEDURE — 82805 BLOOD GASES W/O2 SATURATION: CPT

## 2022-11-11 PROCEDURE — 83880 ASSAY OF NATRIURETIC PEPTIDE: CPT | Performed by: PHYSICIAN ASSISTANT

## 2022-11-11 PROCEDURE — 94799 UNLISTED PULMONARY SVC/PX: CPT

## 2022-11-11 PROCEDURE — 87636 SARSCOV2 & INF A&B AMP PRB: CPT | Performed by: PHYSICIAN ASSISTANT

## 2022-11-11 PROCEDURE — 99291 CRITICAL CARE FIRST HOUR: CPT | Performed by: HOSPITALIST

## 2022-11-11 PROCEDURE — 93970 EXTREMITY STUDY: CPT

## 2022-11-11 PROCEDURE — 99285 EMERGENCY DEPT VISIT HI MDM: CPT

## 2022-11-11 PROCEDURE — 85025 COMPLETE CBC W/AUTO DIFF WBC: CPT | Performed by: PHYSICIAN ASSISTANT

## 2022-11-11 PROCEDURE — 80306 DRUG TEST PRSMV INSTRMNT: CPT | Performed by: PHYSICIAN ASSISTANT

## 2022-11-11 PROCEDURE — 25010000002 PHENYLEPHRINE 10 MG/ML SOLUTION: Performed by: HOSPITALIST

## 2022-11-11 PROCEDURE — 71045 X-RAY EXAM CHEST 1 VIEW: CPT

## 2022-11-11 PROCEDURE — C1751 CATH, INF, PER/CENT/MIDLINE: HCPCS

## 2022-11-11 PROCEDURE — 25010000002 ONDANSETRON PER 1 MG: Performed by: STUDENT IN AN ORGANIZED HEALTH CARE EDUCATION/TRAINING PROGRAM

## 2022-11-11 PROCEDURE — 81003 URINALYSIS AUTO W/O SCOPE: CPT | Performed by: PHYSICIAN ASSISTANT

## 2022-11-11 PROCEDURE — 86140 C-REACTIVE PROTEIN: CPT | Performed by: PHYSICIAN ASSISTANT

## 2022-11-11 PROCEDURE — 82375 ASSAY CARBOXYHB QUANT: CPT

## 2022-11-11 PROCEDURE — 80053 COMPREHEN METABOLIC PANEL: CPT | Performed by: PHYSICIAN ASSISTANT

## 2022-11-11 PROCEDURE — 25010000002 CEFEPIME PER 500 MG: Performed by: STUDENT IN AN ORGANIZED HEALTH CARE EDUCATION/TRAINING PROGRAM

## 2022-11-11 PROCEDURE — 87040 BLOOD CULTURE FOR BACTERIA: CPT | Performed by: PHYSICIAN ASSISTANT

## 2022-11-11 PROCEDURE — 93005 ELECTROCARDIOGRAM TRACING: CPT | Performed by: PHYSICIAN ASSISTANT

## 2022-11-11 PROCEDURE — 93970 EXTREMITY STUDY: CPT | Performed by: RADIOLOGY

## 2022-11-11 PROCEDURE — 36415 COLL VENOUS BLD VENIPUNCTURE: CPT

## 2022-11-11 PROCEDURE — 93010 ELECTROCARDIOGRAM REPORT: CPT | Performed by: INTERNAL MEDICINE

## 2022-11-11 PROCEDURE — 83050 HGB METHEMOGLOBIN QUAN: CPT

## 2022-11-11 PROCEDURE — 25010000002 DIPHENHYDRAMINE PER 50 MG: Performed by: STUDENT IN AN ORGANIZED HEALTH CARE EDUCATION/TRAINING PROGRAM

## 2022-11-11 PROCEDURE — 25010000002 DOBUTAMINE PER 250 MG: Performed by: FAMILY MEDICINE

## 2022-11-11 PROCEDURE — 71045 X-RAY EXAM CHEST 1 VIEW: CPT | Performed by: RADIOLOGY

## 2022-11-11 PROCEDURE — 36600 WITHDRAWAL OF ARTERIAL BLOOD: CPT

## 2022-11-11 PROCEDURE — 93005 ELECTROCARDIOGRAM TRACING: CPT | Performed by: STUDENT IN AN ORGANIZED HEALTH CARE EDUCATION/TRAINING PROGRAM

## 2022-11-11 PROCEDURE — 25010000002 DOBUTAMINE PER 250 MG: Performed by: STUDENT IN AN ORGANIZED HEALTH CARE EDUCATION/TRAINING PROGRAM

## 2022-11-11 PROCEDURE — 94660 CPAP INITIATION&MGMT: CPT

## 2022-11-11 PROCEDURE — 87641 MR-STAPH DNA AMP PROBE: CPT | Performed by: STUDENT IN AN ORGANIZED HEALTH CARE EDUCATION/TRAINING PROGRAM

## 2022-11-11 PROCEDURE — 83605 ASSAY OF LACTIC ACID: CPT | Performed by: PHYSICIAN ASSISTANT

## 2022-11-11 PROCEDURE — 84145 PROCALCITONIN (PCT): CPT | Performed by: PHYSICIAN ASSISTANT

## 2022-11-11 PROCEDURE — 84484 ASSAY OF TROPONIN QUANT: CPT | Performed by: PHYSICIAN ASSISTANT

## 2022-11-11 RX ORDER — NOREPINEPHRINE BIT/0.9 % NACL 8 MG/250ML
.02-.5 INFUSION BOTTLE (ML) INTRAVENOUS
Status: DISCONTINUED | OUTPATIENT
Start: 2022-11-11 | End: 2022-11-12

## 2022-11-11 RX ORDER — DIPHENHYDRAMINE HYDROCHLORIDE 50 MG/ML
25 INJECTION INTRAMUSCULAR; INTRAVENOUS ONCE
Status: COMPLETED | OUTPATIENT
Start: 2022-11-11 | End: 2022-11-11

## 2022-11-11 RX ORDER — MIDODRINE HYDROCHLORIDE 2.5 MG/1
10 TABLET ORAL ONCE
Status: COMPLETED | OUTPATIENT
Start: 2022-11-11 | End: 2022-11-11

## 2022-11-11 RX ORDER — FUROSEMIDE 10 MG/ML
80 INJECTION INTRAMUSCULAR; INTRAVENOUS ONCE
Status: DISCONTINUED | OUTPATIENT
Start: 2022-11-11 | End: 2022-11-11

## 2022-11-11 RX ORDER — DOBUTAMINE HYDROCHLORIDE 200 MG/100ML
2-20 INJECTION INTRAVENOUS
Status: DISCONTINUED | OUTPATIENT
Start: 2022-11-11 | End: 2022-11-12

## 2022-11-11 RX ORDER — ONDANSETRON 2 MG/ML
4 INJECTION INTRAMUSCULAR; INTRAVENOUS ONCE
Status: COMPLETED | OUTPATIENT
Start: 2022-11-11 | End: 2022-11-11

## 2022-11-11 RX ORDER — PHENYLEPHRINE HCL IN 0.9% NACL 0.5 MG/5ML
.5-3 SYRINGE (ML) INTRAVENOUS
Status: DISCONTINUED | OUTPATIENT
Start: 2022-11-11 | End: 2022-11-12

## 2022-11-11 RX ORDER — SODIUM CHLORIDE 0.9 % (FLUSH) 0.9 %
10 SYRINGE (ML) INJECTION AS NEEDED
Status: DISCONTINUED | OUTPATIENT
Start: 2022-11-11 | End: 2022-11-13 | Stop reason: HOSPADM

## 2022-11-11 RX ORDER — MIDODRINE HYDROCHLORIDE 2.5 MG/1
10 TABLET ORAL
Status: DISCONTINUED | OUTPATIENT
Start: 2022-11-12 | End: 2022-11-12

## 2022-11-11 RX ORDER — DOBUTAMINE HYDROCHLORIDE 200 MG/100ML
2.5 INJECTION INTRAVENOUS
Status: DISCONTINUED | OUTPATIENT
Start: 2022-11-11 | End: 2022-11-11

## 2022-11-11 RX ADMIN — ONDANSETRON 4 MG: 2 INJECTION INTRAMUSCULAR; INTRAVENOUS at 19:42

## 2022-11-11 RX ADMIN — DOBUTAMINE HYDROCHLORIDE 3 MCG/KG/MIN: 200 INJECTION INTRAVENOUS at 20:53

## 2022-11-11 RX ADMIN — DOBUTAMINE HYDROCHLORIDE 2.5 MCG/KG/MIN: 200 INJECTION INTRAVENOUS at 18:23

## 2022-11-11 RX ADMIN — CEFEPIME HYDROCHLORIDE 2 G: 2 INJECTION, POWDER, FOR SOLUTION INTRAVENOUS at 19:42

## 2022-11-11 RX ADMIN — PHENYLEPHRINE HYDROCHLORIDE 0.5 MCG/KG/MIN: 10 INJECTION INTRAVENOUS at 21:50

## 2022-11-11 RX ADMIN — DIPHENHYDRAMINE HYDROCHLORIDE 25 MG: 50 INJECTION, SOLUTION INTRAMUSCULAR; INTRAVENOUS at 19:42

## 2022-11-11 RX ADMIN — Medication 0.05 MCG/KG/MIN: at 15:56

## 2022-11-11 RX ADMIN — CARBIDOPA AND LEVODOPA 10 MG: 50; 200 TABLET, EXTENDED RELEASE ORAL at 15:41

## 2022-11-11 NOTE — TELEPHONE ENCOUNTER
Pt is having a hard time breathing and edema. HR 50 O2 is 93 pt stated he couldn't check his BP due to him having a lot of blockages. Advised them to take PT to the ER.

## 2022-11-12 ENCOUNTER — APPOINTMENT (OUTPATIENT)
Dept: GENERAL RADIOLOGY | Facility: HOSPITAL | Age: 67
End: 2022-11-12

## 2022-11-12 ENCOUNTER — APPOINTMENT (OUTPATIENT)
Dept: CARDIOLOGY | Facility: HOSPITAL | Age: 67
End: 2022-11-12

## 2022-11-12 LAB
A-A DO2: 152.5 MMHG (ref 0–300)
ALBUMIN SERPL-MCNC: 3.93 G/DL (ref 3.5–5.2)
ALBUMIN/GLOB SERPL: 1.4 G/DL
ALP SERPL-CCNC: 166 U/L (ref 39–117)
ALT SERPL W P-5'-P-CCNC: 343 U/L (ref 1–41)
AMYLASE SERPL-CCNC: 42 U/L (ref 28–100)
ANION GAP SERPL CALCULATED.3IONS-SCNC: 14.6 MMOL/L (ref 5–15)
ARTERIAL PATENCY WRIST A: POSITIVE
AST SERPL-CCNC: 486 U/L (ref 1–40)
ATMOSPHERIC PRESS: 722 MMHG
BASE EXCESS BLDA CALC-SCNC: -1.8 MMOL/L (ref 0–2)
BASOPHILS # BLD AUTO: 0.07 10*3/MM3 (ref 0–0.2)
BASOPHILS NFR BLD AUTO: 0.4 % (ref 0–1.5)
BDY SITE: ABNORMAL
BH CV ECHO MEAS - ACS: 2.2 CM
BH CV ECHO MEAS - AI P1/2T: 788.8 MSEC
BH CV ECHO MEAS - AO MAX PG: 10.5 MMHG
BH CV ECHO MEAS - AO MEAN PG: 5 MMHG
BH CV ECHO MEAS - AO ROOT DIAM: 3.6 CM
BH CV ECHO MEAS - AO V2 MAX: 162 CM/SEC
BH CV ECHO MEAS - AO V2 VTI: 19.6 CM
BH CV ECHO MEAS - AVA(I,D): 3.1 CM2
BH CV ECHO MEAS - EDV(CUBED): 339.3 ML
BH CV ECHO MEAS - EDV(MOD-SP4): 301 ML
BH CV ECHO MEAS - EF(MOD-SP4): 24.6 %
BH CV ECHO MEAS - ESV(CUBED): 246.5 ML
BH CV ECHO MEAS - ESV(MOD-SP4): 227 ML
BH CV ECHO MEAS - FS: 10.1 %
BH CV ECHO MEAS - IVS/LVPW: 1.05 CM
BH CV ECHO MEAS - IVSD: 1.09 CM
BH CV ECHO MEAS - LA DIMENSION: 4.4 CM
BH CV ECHO MEAS - LV DIASTOLIC VOL/BSA (35-75): 157.7 CM2
BH CV ECHO MEAS - LV MASS(C)D: 345.4 GRAMS
BH CV ECHO MEAS - LV MAX PG: 4.9 MMHG
BH CV ECHO MEAS - LV MEAN PG: 2 MMHG
BH CV ECHO MEAS - LV SYSTOLIC VOL/BSA (12-30): 118.9 CM2
BH CV ECHO MEAS - LV V1 MAX: 111 CM/SEC
BH CV ECHO MEAS - LV V1 VTI: 13.5 CM
BH CV ECHO MEAS - LVIDD: 7 CM
BH CV ECHO MEAS - LVIDS: 6.3 CM
BH CV ECHO MEAS - LVOT AREA: 4.5 CM2
BH CV ECHO MEAS - LVOT DIAM: 2.4 CM
BH CV ECHO MEAS - LVPWD: 1.04 CM
BH CV ECHO MEAS - MR MAX PG: 76.4 MMHG
BH CV ECHO MEAS - MR MAX VEL: 437 CM/SEC
BH CV ECHO MEAS - MR MEAN PG: 39 MMHG
BH CV ECHO MEAS - MR MEAN VEL: 279 CM/SEC
BH CV ECHO MEAS - MR VTI: 86 CM
BH CV ECHO MEAS - MV E MAX VEL: 146 CM/SEC
BH CV ECHO MEAS - MV MAX PG: 11 MMHG
BH CV ECHO MEAS - MV MEAN PG: 3 MMHG
BH CV ECHO MEAS - MV V2 VTI: 45 CM
BH CV ECHO MEAS - MVA(VTI): 1.36 CM2
BH CV ECHO MEAS - PA ACC TIME: 0.11 SEC
BH CV ECHO MEAS - PA PR(ACCEL): 30 MMHG
BH CV ECHO MEAS - RAP SYSTOLE: 10 MMHG
BH CV ECHO MEAS - RVSP: 47.9 MMHG
BH CV ECHO MEAS - SI(MOD-SP4): 38.8 ML/M2
BH CV ECHO MEAS - SV(LVOT): 61.1 ML
BH CV ECHO MEAS - SV(MOD-SP4): 74 ML
BH CV ECHO MEAS - TAPSE (>1.6): 2.44 CM
BH CV ECHO MEAS - TR MAX PG: 37.9 MMHG
BH CV ECHO MEAS - TR MAX VEL: 308 CM/SEC
BILIRUB SERPL-MCNC: 1.9 MG/DL (ref 0–1.2)
BODY TEMPERATURE: 0 C
BUN SERPL-MCNC: 28 MG/DL (ref 8–23)
BUN/CREAT SERPL: 12.1 (ref 7–25)
CALCIUM SPEC-SCNC: 9.1 MG/DL (ref 8.6–10.5)
CHLORIDE SERPL-SCNC: 99 MMOL/L (ref 98–107)
CO2 BLDA-SCNC: 23.4 MMOL/L (ref 22–33)
CO2 SERPL-SCNC: 22.4 MMOL/L (ref 22–29)
COHGB MFR BLD: 2 % (ref 0–5)
CREAT SERPL-MCNC: 2.31 MG/DL (ref 0.76–1.27)
D-LACTATE SERPL-SCNC: 4.9 MMOL/L (ref 0.5–2)
D-LACTATE SERPL-SCNC: 8 MMOL/L (ref 0.5–2)
D-LACTATE SERPL-SCNC: 9.1 MMOL/L (ref 0.5–2)
DEPRECATED RDW RBC AUTO: 50.1 FL (ref 37–54)
EGFRCR SERPLBLD CKD-EPI 2021: 30.2 ML/MIN/1.73
EOSINOPHIL # BLD AUTO: 0 10*3/MM3 (ref 0–0.4)
EOSINOPHIL NFR BLD AUTO: 0 % (ref 0.3–6.2)
ERYTHROCYTE [DISTWIDTH] IN BLOOD BY AUTOMATED COUNT: 14.6 % (ref 12.3–15.4)
GAS FLOW AIRWAY: 4 LPM
GLOBULIN UR ELPH-MCNC: 2.8 GM/DL
GLUCOSE SERPL-MCNC: 150 MG/DL (ref 65–99)
HCO3 BLDA-SCNC: 22.3 MMOL/L (ref 20–26)
HCT VFR BLD AUTO: 35.1 % (ref 37.5–51)
HCT VFR BLD CALC: 38 % (ref 38–51)
HGB BLD-MCNC: 11.9 G/DL (ref 13–17.7)
HGB BLDA-MCNC: 12.4 G/DL (ref 14–18)
IMM GRANULOCYTES # BLD AUTO: 0.07 10*3/MM3 (ref 0–0.05)
IMM GRANULOCYTES NFR BLD AUTO: 0.4 % (ref 0–0.5)
INHALED O2 CONCENTRATION: 36 %
LEFT ATRIUM VOLUME INDEX: 55.5 ML/M2
LIPASE SERPL-CCNC: 22 U/L (ref 13–60)
LYMPHOCYTES # BLD AUTO: 1.56 10*3/MM3 (ref 0.7–3.1)
LYMPHOCYTES NFR BLD AUTO: 9.7 % (ref 19.6–45.3)
Lab: ABNORMAL
Lab: ABNORMAL
MAXIMAL PREDICTED HEART RATE: 153 BPM
MCH RBC QN AUTO: 32.3 PG (ref 26.6–33)
MCHC RBC AUTO-ENTMCNC: 33.9 G/DL (ref 31.5–35.7)
MCV RBC AUTO: 95.4 FL (ref 79–97)
METHGB BLD QL: 0.2 % (ref 0–3)
MODALITY: ABNORMAL
MONOCYTES # BLD AUTO: 0.92 10*3/MM3 (ref 0.1–0.9)
MONOCYTES NFR BLD AUTO: 5.7 % (ref 5–12)
NEUTROPHILS NFR BLD AUTO: 13.45 10*3/MM3 (ref 1.7–7)
NEUTROPHILS NFR BLD AUTO: 83.8 % (ref 42.7–76)
NOTE: ABNORMAL
NOTIFIED BY: ABNORMAL
NOTIFIED WHO: ABNORMAL
NRBC BLD AUTO-RTO: 0 /100 WBC (ref 0–0.2)
OXYHGB MFR BLDV: 82.8 % (ref 94–99)
PCO2 BLDA: 35.1 MM HG (ref 35–45)
PCO2 TEMP ADJ BLD: ABNORMAL MM[HG]
PH BLDA: 7.41 PH UNITS (ref 7.35–7.45)
PH, TEMP CORRECTED: ABNORMAL
PLATELET # BLD AUTO: 191 10*3/MM3 (ref 140–450)
PMV BLD AUTO: 10 FL (ref 6–12)
PO2 BLDA: 51.8 MM HG (ref 83–108)
PO2 TEMP ADJ BLD: ABNORMAL MM[HG]
POTASSIUM SERPL-SCNC: 4.4 MMOL/L (ref 3.5–5.2)
PROT SERPL-MCNC: 6.7 G/DL (ref 6–8.5)
RBC # BLD AUTO: 3.68 10*6/MM3 (ref 4.14–5.8)
SAO2 % BLDCOA: 84.7 % (ref 94–99)
SODIUM SERPL-SCNC: 136 MMOL/L (ref 136–145)
STRESS TARGET HR: 130 BPM
VENTILATOR MODE: ABNORMAL
WBC NRBC COR # BLD: 16.07 10*3/MM3 (ref 3.4–10.8)

## 2022-11-12 PROCEDURE — 25010000002 DOBUTAMINE PER 250 MG: Performed by: HOSPITALIST

## 2022-11-12 PROCEDURE — 82805 BLOOD GASES W/O2 SATURATION: CPT

## 2022-11-12 PROCEDURE — 25010000002 DOPAMINE PER 40 MG: Performed by: INTERNAL MEDICINE

## 2022-11-12 PROCEDURE — 83050 HGB METHEMOGLOBIN QUAN: CPT

## 2022-11-12 PROCEDURE — 25010000002 ONDANSETRON PER 1 MG: Performed by: INTERNAL MEDICINE

## 2022-11-12 PROCEDURE — 71045 X-RAY EXAM CHEST 1 VIEW: CPT

## 2022-11-12 PROCEDURE — 83605 ASSAY OF LACTIC ACID: CPT | Performed by: HOSPITALIST

## 2022-11-12 PROCEDURE — 82150 ASSAY OF AMYLASE: CPT | Performed by: INTERNAL MEDICINE

## 2022-11-12 PROCEDURE — 93306 TTE W/DOPPLER COMPLETE: CPT | Performed by: INTERNAL MEDICINE

## 2022-11-12 PROCEDURE — 80053 COMPREHEN METABOLIC PANEL: CPT | Performed by: INTERNAL MEDICINE

## 2022-11-12 PROCEDURE — 36600 WITHDRAWAL OF ARTERIAL BLOOD: CPT

## 2022-11-12 PROCEDURE — 99222 1ST HOSP IP/OBS MODERATE 55: CPT | Performed by: INTERNAL MEDICINE

## 2022-11-12 PROCEDURE — 25010000002 PHENYLEPHRINE 10 MG/ML SOLUTION: Performed by: HOSPITALIST

## 2022-11-12 PROCEDURE — 99291 CRITICAL CARE FIRST HOUR: CPT | Performed by: STUDENT IN AN ORGANIZED HEALTH CARE EDUCATION/TRAINING PROGRAM

## 2022-11-12 PROCEDURE — 93306 TTE W/DOPPLER COMPLETE: CPT

## 2022-11-12 PROCEDURE — 85025 COMPLETE CBC W/AUTO DIFF WBC: CPT | Performed by: HOSPITALIST

## 2022-11-12 PROCEDURE — 74018 RADEX ABDOMEN 1 VIEW: CPT

## 2022-11-12 PROCEDURE — 25010000002 HYDROMORPHONE PER 4 MG: Performed by: STUDENT IN AN ORGANIZED HEALTH CARE EDUCATION/TRAINING PROGRAM

## 2022-11-12 PROCEDURE — 25010000002 EPINEPHRINE 1 MG/ML SOLUTION 30 ML VIAL: Performed by: HOSPITALIST

## 2022-11-12 PROCEDURE — 82375 ASSAY CARBOXYHB QUANT: CPT

## 2022-11-12 PROCEDURE — 83690 ASSAY OF LIPASE: CPT | Performed by: INTERNAL MEDICINE

## 2022-11-12 PROCEDURE — 94799 UNLISTED PULMONARY SVC/PX: CPT

## 2022-11-12 PROCEDURE — 94660 CPAP INITIATION&MGMT: CPT

## 2022-11-12 RX ORDER — RANOLAZINE 500 MG/1
500 TABLET, EXTENDED RELEASE ORAL 2 TIMES DAILY
Status: CANCELLED | OUTPATIENT
Start: 2022-11-12

## 2022-11-12 RX ORDER — TORSEMIDE 20 MG/1
20 TABLET ORAL DAILY
Status: CANCELLED | OUTPATIENT
Start: 2022-11-12

## 2022-11-12 RX ORDER — MIDODRINE HYDROCHLORIDE 2.5 MG/1
5 TABLET ORAL
Status: CANCELLED | OUTPATIENT
Start: 2022-11-12

## 2022-11-12 RX ORDER — ONDANSETRON 2 MG/ML
4 INJECTION INTRAMUSCULAR; INTRAVENOUS EVERY 6 HOURS PRN
Status: DISCONTINUED | OUTPATIENT
Start: 2022-11-12 | End: 2022-11-12 | Stop reason: SDUPTHER

## 2022-11-12 RX ORDER — SPIRONOLACTONE 25 MG/1
25 TABLET ORAL DAILY
COMMUNITY

## 2022-11-12 RX ORDER — SODIUM CHLORIDE 0.9 % (FLUSH) 0.9 %
10 SYRINGE (ML) INJECTION AS NEEDED
Status: DISCONTINUED | OUTPATIENT
Start: 2022-11-12 | End: 2022-11-13 | Stop reason: HOSPADM

## 2022-11-12 RX ORDER — HYDROMORPHONE HYDROCHLORIDE 1 MG/ML
0.5 INJECTION, SOLUTION INTRAMUSCULAR; INTRAVENOUS; SUBCUTANEOUS
Status: DISCONTINUED | OUTPATIENT
Start: 2022-11-12 | End: 2022-11-13 | Stop reason: HOSPADM

## 2022-11-12 RX ORDER — SACUBITRIL AND VALSARTAN 24; 26 MG/1; MG/1
1 TABLET, FILM COATED ORAL 2 TIMES DAILY
COMMUNITY

## 2022-11-12 RX ORDER — SODIUM CHLORIDE 0.9 % (FLUSH) 0.9 %
10 SYRINGE (ML) INJECTION EVERY 12 HOURS SCHEDULED
Status: DISCONTINUED | OUTPATIENT
Start: 2022-11-12 | End: 2022-11-13 | Stop reason: HOSPADM

## 2022-11-12 RX ORDER — SODIUM CHLORIDE 0.9 % (FLUSH) 0.9 %
20 SYRINGE (ML) INJECTION AS NEEDED
Status: DISCONTINUED | OUTPATIENT
Start: 2022-11-12 | End: 2022-11-13 | Stop reason: HOSPADM

## 2022-11-12 RX ORDER — FLUDROCORTISONE ACETATE 0.1 MG/1
0.1 TABLET ORAL DAILY
Status: CANCELLED | OUTPATIENT
Start: 2022-11-12

## 2022-11-12 RX ORDER — ENOXAPARIN SODIUM 100 MG/ML
40 INJECTION SUBCUTANEOUS EVERY 24 HOURS
Status: DISCONTINUED | OUTPATIENT
Start: 2022-11-12 | End: 2022-11-13

## 2022-11-12 RX ORDER — ONDANSETRON 2 MG/ML
4 INJECTION INTRAMUSCULAR; INTRAVENOUS EVERY 6 HOURS PRN
Status: DISCONTINUED | OUTPATIENT
Start: 2022-11-12 | End: 2022-11-13 | Stop reason: HOSPADM

## 2022-11-12 RX ORDER — LORAZEPAM 2 MG/ML
0.5 INJECTION INTRAMUSCULAR EVERY 4 HOURS PRN
Status: DISCONTINUED | OUTPATIENT
Start: 2022-11-12 | End: 2022-11-13 | Stop reason: HOSPADM

## 2022-11-12 RX ORDER — PANTOPRAZOLE SODIUM 40 MG/10ML
40 INJECTION, POWDER, LYOPHILIZED, FOR SOLUTION INTRAVENOUS
Status: DISCONTINUED | OUTPATIENT
Start: 2022-11-13 | End: 2022-11-13 | Stop reason: HOSPADM

## 2022-11-12 RX ORDER — ATORVASTATIN CALCIUM 40 MG/1
80 TABLET, FILM COATED ORAL DAILY
Status: CANCELLED | OUTPATIENT
Start: 2022-11-12

## 2022-11-12 RX ORDER — NITROGLYCERIN 0.4 MG/1
0.4 TABLET SUBLINGUAL
Status: CANCELLED | OUTPATIENT
Start: 2022-11-12

## 2022-11-12 RX ORDER — SPIRONOLACTONE 25 MG/1
25 TABLET ORAL DAILY
Status: CANCELLED | OUTPATIENT
Start: 2022-11-12

## 2022-11-12 RX ORDER — ALBUTEROL SULFATE 2.5 MG/3ML
2.5 SOLUTION RESPIRATORY (INHALATION) EVERY 6 HOURS PRN
Status: CANCELLED | OUTPATIENT
Start: 2022-11-12

## 2022-11-12 RX ORDER — GLYCOPYRROLATE 0.2 MG/ML
0.2 INJECTION INTRAMUSCULAR; INTRAVENOUS EVERY 4 HOURS PRN
Status: DISCONTINUED | OUTPATIENT
Start: 2022-11-12 | End: 2022-11-13 | Stop reason: HOSPADM

## 2022-11-12 RX ORDER — CARVEDILOL 6.25 MG/1
6.25 TABLET ORAL 2 TIMES DAILY WITH MEALS
COMMUNITY
End: 2022-11-13

## 2022-11-12 RX ORDER — DOPAMINE HYDROCHLORIDE 160 MG/100ML
2-20 INJECTION, SOLUTION INTRAVENOUS
Status: DISCONTINUED | OUTPATIENT
Start: 2022-11-12 | End: 2022-11-12

## 2022-11-12 RX ORDER — CARVEDILOL 6.25 MG/1
6.25 TABLET ORAL 2 TIMES DAILY WITH MEALS
Status: CANCELLED | OUTPATIENT
Start: 2022-11-12

## 2022-11-12 RX ORDER — BUMETANIDE 0.25 MG/ML
1 INJECTION INTRAMUSCULAR; INTRAVENOUS ONCE
Status: COMPLETED | OUTPATIENT
Start: 2022-11-12 | End: 2022-11-12

## 2022-11-12 RX ADMIN — Medication 0.5 MCG/KG/MIN: at 14:06

## 2022-11-12 RX ADMIN — PHENYLEPHRINE HYDROCHLORIDE 3 MCG/KG/MIN: 10 INJECTION INTRAVENOUS at 08:27

## 2022-11-12 RX ADMIN — BUMETANIDE 1 MG: 0.25 INJECTION, SOLUTION INTRAMUSCULAR; INTRAVENOUS at 12:17

## 2022-11-12 RX ADMIN — PHENYLEPHRINE HYDROCHLORIDE 3 MCG/KG/MIN: 10 INJECTION INTRAVENOUS at 03:32

## 2022-11-12 RX ADMIN — Medication 10 ML: at 00:59

## 2022-11-12 RX ADMIN — EPINEPHRINE 0.3 MCG/KG/MIN: 1 INJECTION INTRAMUSCULAR; INTRAVENOUS; SUBCUTANEOUS at 08:25

## 2022-11-12 RX ADMIN — DOPAMINE HYDROCHLORIDE IN DEXTROSE 2 MCG/KG/MIN: 1.6 INJECTION, SOLUTION INTRAVENOUS at 02:53

## 2022-11-12 RX ADMIN — Medication 10 ML: at 22:07

## 2022-11-12 RX ADMIN — ONDANSETRON 4 MG: 2 INJECTION INTRAMUSCULAR; INTRAVENOUS at 04:12

## 2022-11-12 RX ADMIN — Medication 10 ML: at 08:29

## 2022-11-12 RX ADMIN — HYDROMORPHONE HYDROCHLORIDE 0.5 MG: 1 INJECTION, SOLUTION INTRAMUSCULAR; INTRAVENOUS; SUBCUTANEOUS at 15:50

## 2022-11-12 RX ADMIN — DOBUTAMINE HYDROCHLORIDE 20 MCG/KG/MIN: 200 INJECTION INTRAVENOUS at 06:07

## 2022-11-12 RX ADMIN — VASOPRESSIN 0.03 UNITS/MIN: 0.2 INJECTION INTRAVENOUS at 04:42

## 2022-11-12 RX ADMIN — Medication 10 ML: at 22:06

## 2022-11-12 RX ADMIN — Medication 0.3 MCG/KG/MIN: at 00:45

## 2022-11-12 RX ADMIN — Medication 10 ML: at 08:30

## 2022-11-12 RX ADMIN — PHENYLEPHRINE HYDROCHLORIDE 3 MCG/KG/MIN: 10 INJECTION INTRAVENOUS at 12:37

## 2022-11-12 RX ADMIN — EPINEPHRINE 0.02 MCG/KG/MIN: 1 INJECTION INTRAMUSCULAR; INTRAVENOUS; SUBCUTANEOUS at 03:50

## 2022-11-12 RX ADMIN — HYDROMORPHONE HYDROCHLORIDE 0.5 MG: 1 INJECTION, SOLUTION INTRAMUSCULAR; INTRAVENOUS; SUBCUTANEOUS at 22:06

## 2022-11-12 RX ADMIN — Medication 0.5 MCG/KG/MIN: at 10:17

## 2022-11-12 RX ADMIN — DOPAMINE HYDROCHLORIDE IN DEXTROSE 20 MCG/KG/MIN: 1.6 INJECTION, SOLUTION INTRAVENOUS at 12:26

## 2022-11-12 RX ADMIN — Medication 0.5 MCG/KG/MIN: at 06:07

## 2022-11-12 RX ADMIN — DOBUTAMINE HYDROCHLORIDE 20 MCG/KG/MIN: 200 INJECTION INTRAVENOUS at 12:33

## 2022-11-12 RX ADMIN — EPINEPHRINE 0.3 MCG/KG/MIN: 1 INJECTION INTRAMUSCULAR; INTRAVENOUS; SUBCUTANEOUS at 13:03

## 2022-11-12 RX ADMIN — DOPAMINE HYDROCHLORIDE IN DEXTROSE 20 MCG/KG/MIN: 1.6 INJECTION, SOLUTION INTRAVENOUS at 07:30

## 2022-11-12 RX ADMIN — VASOPRESSIN 0.03 UNITS/MIN: 0.2 INJECTION INTRAVENOUS at 13:05

## 2022-11-13 VITALS
RESPIRATION RATE: 21 BRPM | SYSTOLIC BLOOD PRESSURE: 49 MMHG | DIASTOLIC BLOOD PRESSURE: 38 MMHG | BODY MASS INDEX: 23.05 KG/M2 | WEIGHT: 161 LBS | TEMPERATURE: 98.1 F | HEIGHT: 70 IN | OXYGEN SATURATION: 89 % | HEART RATE: 81 BPM

## 2022-11-13 PROBLEM — Z51.5 HOSPICE CARE: Status: ACTIVE | Noted: 2022-11-13

## 2022-11-13 PROBLEM — I50.23 ACUTE ON CHRONIC HFREF (HEART FAILURE WITH REDUCED EJECTION FRACTION): Status: ACTIVE | Noted: 2022-11-13

## 2022-11-13 LAB
QT INTERVAL: 424 MS
QT INTERVAL: 474 MS
QT INTERVAL: 476 MS
QTC INTERVAL: 495 MS
QTC INTERVAL: 528 MS
QTC INTERVAL: 533 MS

## 2022-11-13 PROCEDURE — 99239 HOSP IP/OBS DSCHRG MGMT >30: CPT | Performed by: STUDENT IN AN ORGANIZED HEALTH CARE EDUCATION/TRAINING PROGRAM

## 2022-11-13 RX ORDER — LORAZEPAM 1 MG/1
1 TABLET ORAL EVERY 8 HOURS PRN
Qty: 10 TABLET | Refills: 0 | Status: SHIPPED | OUTPATIENT
Start: 2022-11-13

## 2022-11-13 RX ORDER — TORSEMIDE 20 MG/1
20 TABLET ORAL DAILY
Qty: 20 TABLET | Refills: 0 | Status: SHIPPED | OUTPATIENT
Start: 2022-11-13

## 2022-11-13 RX ORDER — MORPHINE SULFATE 15 MG/1
15 TABLET ORAL EVERY 4 HOURS PRN
Qty: 10 TABLET | Refills: 0 | Status: SHIPPED | OUTPATIENT
Start: 2022-11-13

## 2022-11-16 LAB
BACTERIA SPEC AEROBE CULT: NORMAL
BACTERIA SPEC AEROBE CULT: NORMAL

## 2022-12-08 NOTE — PROGRESS NOTES
Enter Query Response Below      Query Response:     heart failure due to ischemic cardiomyopathy  Electronically signed by Chris Ocampo MD, 22, 9:37 PM EST.        If applicable, please update the problem list.      Patient: Reza Mcclelland        : 1955  Account: [id number]           Admit Date: 2022        How to Respond to this query:       a. Click New Note     b. Answer query within the yellow box.                c. Update the Problem List, if applicable.      If you have any questions about this query contact me at: [phone]    Dr. Ocampo:    67 y.o. male with history of hypertension, systolic congestive heart failure, CAD s/p stents and CABG, paroxysmal afib, ischemic cardiomyopathy, presented with worsening shortness of breath and edema.  Patient diagnosed with acute on chronic systolic heart failure.   Echocardiogram summary includes:    Left ventricular ejection fraction appears to be less than 20%.    Moderate to severe mitral valve regurgitation is present.    Moderate to severe tricuspid valve regurgitation is present.    Patient was made DNR and discharged home with hospice.      Can you please clarify etiology of heart failure as:    - heart failure due to hypertension  - heart failure due to ischemic cardiomyopathy  - heart failure due to valvular disease   - other (specify) _________________  - unable to determine     By submitting this query, we are merely seeking further clarification of documentation to accurately reflect all conditions that you are monitoring, evaluating, treating or that extend the hospitalization or utilize additional resources of care. Please utilize your independent clinical judgment when addressing the question(s) above.     This query and your response, once completed, will be entered into the legal medical record.    Sincerely,  Sulma Gandhi RN, MSN  Clinical Documentation Integrity Program   [email or web address]

## 2023-01-31 ENCOUNTER — TELEPHONE (OUTPATIENT)
Dept: CARDIOLOGY | Facility: CLINIC | Age: 68
End: 2023-01-31
Payer: MEDICARE

## 2023-01-31 NOTE — TELEPHONE ENCOUNTER
Left a message for patient to return my call to discuss symptoms related to A Fib ongoing for 17 days, and verify his medication regimen.  He was hospitalized in November and Eliquis is no longer on his current medications list.

## 2023-06-19 ENCOUNTER — TELEPHONE (OUTPATIENT)
Dept: CARDIOLOGY | Facility: CLINIC | Age: 68
End: 2023-06-19
Payer: COMMERCIAL

## 2023-06-19 NOTE — TELEPHONE ENCOUNTER
I called the patient to let them know that their scheduled remote device transmission did not come through.  There was no answer, and I was unable to leave a message.

## 2023-06-26 NOTE — PROGRESS NOTES
"Reza Mcclelland    Subjective     CC: facial droop    History of Present Illness     Reza Mcclelland is followed for questionable stroke. He reportedly developed a facial droop and dysarthria along with SOB, raising concern for stroke at an outside hospital. He currently reports feeling better, and feels that his dysarthria was related to dry mouth. He denies any neurological symptoms currently.    There have been no other changes in the patient's interval history since Dr. Wnag's consult note of 12/15/17.    I have reviewed and confirmed the past family, social and medical history as accurate on 12/16/17.     Review of Systems   Constitutional: Negative.    Respiratory: Negative.    Cardiovascular: Negative.    Gastrointestinal: Negative.    Genitourinary: Negative.    Musculoskeletal: Negative.        Objective     /70  Pulse 69  Temp 98 °F (36.7 °C) (Oral)   Resp 18  Ht 172.7 cm (68\")  Wt 90.3 kg (199 lb)  SpO2 97%  BMI 30.26 kg/m2    Physical Exam   Constitutional: He is oriented to person, place, and time.   Neurological: He is oriented to person, place, and time. He has normal strength.   Psychiatric: His speech is normal.        Neurologic Exam     Mental Status   Oriented to person, place, and time.   Attention: normal.   Speech: speech is normal   Level of consciousness: alert  Normal comprehension.     Cranial Nerves   Cranial nerves II through XII intact.     Motor Exam   Muscle bulk: normal  Overall muscle tone: normal    Strength   Strength 5/5 throughout.     Sensory Exam   Light touch normal.       Laboratory and radiological testing is notable for BUN/Cr=30/1.4. WBC=18.08. A head CT shows extensive areas of prior ischemia in the right hemisphere, but I see no acute findings (I personally reviewed images). Carotid Doppler studies show occlusion of the left ICA (on review of the medical record this appears to be a chronic condition).     Assessment/Plan     Reza Mcclelland is " Minimal seen for possible stroke. While it is difficult to exclude the possibility of stroke, I more strongly suspect the recrudescence of prior stroke symptoms secondary his other medical issues. His workup does not suggest new cerebrovascular disease, and as he is already anticoagulated for cardiac reasons, I would not recommend any changes in his regimen from a neurologic perspective. As I have nothing further to add at this point, I'll sign off.        As part of this visit I reviewed prior lab results, reviewed radiology results, reviewed radiology images and reviewed records from the current hospitalization which is incorporated in the HPI.   Minimal Minimal Minimal Minimal Minimal

## 2023-12-02 NOTE — TELEPHONE ENCOUNTER
Patients significant other called and stated patient needs letter stating it is okay to fly because he has defibrillator.  Please advise if this is true.    
Per [facility] patient should not need a letter to fly for an implanted device. He suggested they check with airline to make sure but we have never had to do letters before for defibrillators. She verbalizes understanding.  
yes

## 2024-10-02 ENCOUNTER — TELEPHONE (OUTPATIENT)
Dept: CARDIOLOGY | Facility: CLINIC | Age: 69
End: 2024-10-02
Payer: MEDICARE

## 2024-10-02 NOTE — TELEPHONE ENCOUNTER
Noncompliance with remote monitoring. Last transmission received was 1/31/23. Unable to contact patient by phone. No upcoming office visit. Patient unenrolled in remote monitoring

## (undated) DEVICE — PINNACLE R/O II INTRODUCER SHEATH WITH RADIOPAQUE MARKER: Brand: PINNACLE

## (undated) DEVICE — MODEL AT P65, P/N 701554-001KIT CONTENTS: HAND CONTROLLER, 3-WAY HIGH-PRESSURE STOPCOCK WITH ROTATING END AND PREMIUM HIGH-PRESSURE TUBING: Brand: ANGIOTOUCH® KIT

## (undated) DEVICE — GW PT 2 MS .014 185CM STR TP

## (undated) DEVICE — GUIDELINER CATHETERS ARE INTENDED TO BE USED IN CONJUNCTION WITH GUIDE CATHETERS TO ACCESS DISCRETE REGIONS OF THE CORONARY AND/OR PERIPHERAL VASCULATURE, AND TO FACILITATE PLACEMENT OF INTERVENTIONAL DEVICES.: Brand: GUIDELINER® V3 CATHETER

## (undated) DEVICE — PLASMABLADE PS210-030S 3.0S LOCK: Brand: PLASMABLADE™

## (undated) DEVICE — ST INF PRI SMRTSTE 20DRP 2VLV 24ML 117

## (undated) DEVICE — KT VLV HEMO MAP ACC PLS LG/BORE MTL/INTRO W/TORQ/DEV

## (undated) DEVICE — NC TREK CORONARY DILATATION CATHETER 2.5 MM X 20 MM / RAPID-EXCHANGE: Brand: NC TREK

## (undated) DEVICE — MAGNETIC DRAPE: Brand: DEVON

## (undated) DEVICE — SOL NS 500ML

## (undated) DEVICE — PROXIMATE RH ROTATING HEAD SKIN STAPLERS (35 WIDE) CONTAINS 35 STAINLESS STEEL STAPLES: Brand: PROXIMATE

## (undated) DEVICE — ADULT, W/LG. BACK PAD, RADIOTRANSPARENT ELEMENT AND LEAD WIRE: Brand: DEFIBRILLATION ELECTRODES

## (undated) DEVICE — DRSNG SURG AQUACEL AG 9X15CM

## (undated) DEVICE — CANN NASL CO2 DIVIDED A/

## (undated) DEVICE — MEDI-VAC YANKAUER SUCTION HANDLE W/BULBOUS TIP: Brand: CARDINAL HEALTH

## (undated) DEVICE — GUIDE CATHETER: Brand: MACH1™

## (undated) DEVICE — ST EXT IV SMARTSITE 2VLV SP M LL 5ML IV1

## (undated) DEVICE — LEX ELECTRO PHYSIOLOGY: Brand: MEDLINE INDUSTRIES, INC.

## (undated) DEVICE — IRRIGATOR BULB ASEPTO 60CC STRL

## (undated) DEVICE — TUBING, SUCTION, 1/4" X 10', STRAIGHT: Brand: MEDLINE

## (undated) DEVICE — MODEL BT2000 P/N 700287-012KIT CONTENTS: MANIFOLD WITH SALINE AND CONTRAST PORTS, SALINE TUBING WITH SPIKE AND HAND SYRINGE, TRANSDUCER: Brand: BT2000 AUTOMATED MANIFOLD KIT

## (undated) DEVICE — DECANTER: Brand: UNBRANDED

## (undated) DEVICE — CATH DIAG EXPO M/ PK 6FR FL4/FR4 PIG 3PK

## (undated) DEVICE — PINNACLE INTRODUCER SHEATH: Brand: PINNACLE

## (undated) DEVICE — LIMB HOLDER, WRIST/ANKLE: Brand: DEROYAL

## (undated) DEVICE — GW PRESS VERRATA STR 185CM 10185P

## (undated) DEVICE — INTRAOPERATIVE COVER KIT, 10 PACK: Brand: SITE-RITE

## (undated) DEVICE — PK CATH CARD 10